# Patient Record
Sex: MALE | Race: WHITE | NOT HISPANIC OR LATINO | Employment: OTHER | URBAN - METROPOLITAN AREA
[De-identification: names, ages, dates, MRNs, and addresses within clinical notes are randomized per-mention and may not be internally consistent; named-entity substitution may affect disease eponyms.]

---

## 2017-02-14 ENCOUNTER — APPOINTMENT (OUTPATIENT)
Dept: ULTRASOUND IMAGING | Facility: HOSPITAL | Age: 62
End: 2017-02-14

## 2017-02-14 ENCOUNTER — HOSPITAL ENCOUNTER (INPATIENT)
Facility: HOSPITAL | Age: 62
LOS: 7 days | Discharge: HOME | End: 2017-02-21
Attending: EMERGENCY MEDICINE | Admitting: INTERNAL MEDICINE

## 2017-02-14 ENCOUNTER — APPOINTMENT (OUTPATIENT)
Dept: CT IMAGING | Facility: HOSPITAL | Age: 62
End: 2017-02-14

## 2017-02-14 DIAGNOSIS — K81.9 CHOLECYSTITIS: ICD-10-CM

## 2017-02-14 DIAGNOSIS — N20.1 RIGHT URETERAL STONE: Primary | ICD-10-CM

## 2017-02-14 DIAGNOSIS — K80.20 GALLSTONES: ICD-10-CM

## 2017-02-14 DIAGNOSIS — R10.11 RIGHT UPPER QUADRANT ABDOMINAL PAIN: ICD-10-CM

## 2017-02-14 PROBLEM — I10 HTN (HYPERTENSION): Status: ACTIVE | Noted: 2017-02-14

## 2017-02-14 LAB
ALBUMIN SERPL-MCNC: 4.5 G/DL (ref 3.2–4.8)
ALBUMIN/GLOB SERPL: 1.7 G/DL (ref 1.5–2.5)
ALP SERPL-CCNC: 63 U/L (ref 25–100)
ALT SERPL W P-5'-P-CCNC: 32 U/L (ref 7–40)
ANION GAP SERPL CALCULATED.3IONS-SCNC: 5 MMOL/L (ref 3–11)
AST SERPL-CCNC: 29 U/L (ref 0–33)
BACTERIA UR QL AUTO: ABNORMAL /HPF
BASOPHILS # BLD AUTO: 0.02 10*3/MM3 (ref 0–0.2)
BASOPHILS NFR BLD AUTO: 0.2 % (ref 0–1)
BILIRUB SERPL-MCNC: 0.8 MG/DL (ref 0.3–1.2)
BILIRUB UR QL STRIP: NEGATIVE
BUN BLD-MCNC: 13 MG/DL (ref 9–23)
BUN/CREAT SERPL: 14.4 (ref 7–25)
CALCIUM SPEC-SCNC: 10.3 MG/DL (ref 8.7–10.4)
CHLORIDE SERPL-SCNC: 102 MMOL/L (ref 99–109)
CLARITY UR: CLEAR
CO2 SERPL-SCNC: 29 MMOL/L (ref 20–31)
COLOR UR: YELLOW
CREAT BLD-MCNC: 0.9 MG/DL (ref 0.6–1.3)
DEPRECATED RDW RBC AUTO: 42.3 FL (ref 37–54)
EOSINOPHIL # BLD AUTO: 0.35 10*3/MM3 (ref 0.1–0.3)
EOSINOPHIL NFR BLD AUTO: 3.8 % (ref 0–3)
ERYTHROCYTE [DISTWIDTH] IN BLOOD BY AUTOMATED COUNT: 12.5 % (ref 11.3–14.5)
GFR SERPL CREATININE-BSD FRML MDRD: 86 ML/MIN/1.73
GLOBULIN UR ELPH-MCNC: 2.7 GM/DL
GLUCOSE BLD-MCNC: 126 MG/DL (ref 70–100)
GLUCOSE UR STRIP-MCNC: NEGATIVE MG/DL
HCT VFR BLD AUTO: 46.9 % (ref 38.9–50.9)
HGB BLD-MCNC: 16.2 G/DL (ref 13.1–17.5)
HGB UR QL STRIP.AUTO: ABNORMAL
HOLD SPECIMEN: NORMAL
HOLD SPECIMEN: NORMAL
HYALINE CASTS UR QL AUTO: ABNORMAL /LPF
IMM GRANULOCYTES # BLD: 0.08 10*3/MM3 (ref 0–0.03)
IMM GRANULOCYTES NFR BLD: 0.9 % (ref 0–0.6)
KETONES UR QL STRIP: NEGATIVE
LEUKOCYTE ESTERASE UR QL STRIP.AUTO: NEGATIVE
LIPASE SERPL-CCNC: 33 U/L (ref 6–51)
LYMPHOCYTES # BLD AUTO: 1.61 10*3/MM3 (ref 0.6–4.8)
LYMPHOCYTES NFR BLD AUTO: 17.5 % (ref 24–44)
MCH RBC QN AUTO: 32 PG (ref 27–31)
MCHC RBC AUTO-ENTMCNC: 34.5 G/DL (ref 32–36)
MCV RBC AUTO: 92.5 FL (ref 80–99)
MONOCYTES # BLD AUTO: 0.87 10*3/MM3 (ref 0–1)
MONOCYTES NFR BLD AUTO: 9.5 % (ref 0–12)
NEUTROPHILS # BLD AUTO: 6.27 10*3/MM3 (ref 1.5–8.3)
NEUTROPHILS NFR BLD AUTO: 68.1 % (ref 41–71)
NITRITE UR QL STRIP: NEGATIVE
PH UR STRIP.AUTO: 6 [PH] (ref 5–8)
PLATELET # BLD AUTO: 254 10*3/MM3 (ref 150–450)
PMV BLD AUTO: 9.9 FL (ref 6–12)
POTASSIUM BLD-SCNC: 4 MMOL/L (ref 3.5–5.5)
PROT SERPL-MCNC: 7.2 G/DL (ref 5.7–8.2)
PROT UR QL STRIP: NEGATIVE
RBC # BLD AUTO: 5.07 10*6/MM3 (ref 4.2–5.76)
RBC # UR: ABNORMAL /HPF
REF LAB TEST METHOD: ABNORMAL
SODIUM BLD-SCNC: 136 MMOL/L (ref 132–146)
SP GR UR STRIP: 1.02 (ref 1–1.03)
SQUAMOUS #/AREA URNS HPF: ABNORMAL /HPF
UROBILINOGEN UR QL STRIP: ABNORMAL
WBC NRBC COR # BLD: 9.2 10*3/MM3 (ref 3.5–10.8)
WBC UR QL AUTO: ABNORMAL /HPF
WHOLE BLOOD HOLD SPECIMEN: NORMAL
WHOLE BLOOD HOLD SPECIMEN: NORMAL

## 2017-02-14 PROCEDURE — 76705 ECHO EXAM OF ABDOMEN: CPT

## 2017-02-14 PROCEDURE — 25010000002 PIPERACILLIN SOD-TAZOBACTAM PER 1 G: Performed by: EMERGENCY MEDICINE

## 2017-02-14 PROCEDURE — 99284 EMERGENCY DEPT VISIT MOD MDM: CPT

## 2017-02-14 PROCEDURE — 25010000002 PIPERACILLIN-TAZOBACTAM: Performed by: INTERNAL MEDICINE

## 2017-02-14 PROCEDURE — 25010000002 HEPARIN (PORCINE) PER 1000 UNITS: Performed by: INTERNAL MEDICINE

## 2017-02-14 PROCEDURE — 81001 URINALYSIS AUTO W/SCOPE: CPT | Performed by: EMERGENCY MEDICINE

## 2017-02-14 PROCEDURE — 25010000002 HYDROMORPHONE PER 4 MG: Performed by: EMERGENCY MEDICINE

## 2017-02-14 PROCEDURE — 25010000002 KETOROLAC TROMETHAMINE PER 15 MG: Performed by: EMERGENCY MEDICINE

## 2017-02-14 PROCEDURE — 80053 COMPREHEN METABOLIC PANEL: CPT | Performed by: EMERGENCY MEDICINE

## 2017-02-14 PROCEDURE — 25010000002 KETOROLAC TROMETHAMINE PER 15 MG: Performed by: INTERNAL MEDICINE

## 2017-02-14 PROCEDURE — 85025 COMPLETE CBC W/AUTO DIFF WBC: CPT | Performed by: EMERGENCY MEDICINE

## 2017-02-14 PROCEDURE — G0378 HOSPITAL OBSERVATION PER HR: HCPCS

## 2017-02-14 PROCEDURE — 93005 ELECTROCARDIOGRAM TRACING: CPT | Performed by: EMERGENCY MEDICINE

## 2017-02-14 PROCEDURE — 99223 1ST HOSP IP/OBS HIGH 75: CPT | Performed by: INTERNAL MEDICINE

## 2017-02-14 PROCEDURE — 83690 ASSAY OF LIPASE: CPT | Performed by: EMERGENCY MEDICINE

## 2017-02-14 PROCEDURE — 25010000002 PROMETHAZINE PER 50 MG: Performed by: EMERGENCY MEDICINE

## 2017-02-14 PROCEDURE — 74176 CT ABD & PELVIS W/O CONTRAST: CPT

## 2017-02-14 RX ORDER — SENNA AND DOCUSATE SODIUM 50; 8.6 MG/1; MG/1
2 TABLET, FILM COATED ORAL 2 TIMES DAILY
Status: DISCONTINUED | OUTPATIENT
Start: 2017-02-14 | End: 2017-02-21 | Stop reason: HOSPADM

## 2017-02-14 RX ORDER — SODIUM CHLORIDE 0.9 % (FLUSH) 0.9 %
10 SYRINGE (ML) INJECTION AS NEEDED
Status: DISCONTINUED | OUTPATIENT
Start: 2017-02-14 | End: 2017-02-15

## 2017-02-14 RX ORDER — KETOROLAC TROMETHAMINE 30 MG/ML
30 INJECTION, SOLUTION INTRAMUSCULAR; INTRAVENOUS ONCE
Status: COMPLETED | OUTPATIENT
Start: 2017-02-14 | End: 2017-02-14

## 2017-02-14 RX ORDER — SODIUM CHLORIDE 0.9 % (FLUSH) 0.9 %
1-10 SYRINGE (ML) INJECTION AS NEEDED
Status: DISCONTINUED | OUTPATIENT
Start: 2017-02-14 | End: 2017-02-21 | Stop reason: HOSPADM

## 2017-02-14 RX ORDER — DOCUSATE SODIUM 100 MG/1
100 CAPSULE, LIQUID FILLED ORAL 2 TIMES DAILY
Status: DISCONTINUED | OUTPATIENT
Start: 2017-02-14 | End: 2017-02-21 | Stop reason: HOSPADM

## 2017-02-14 RX ORDER — HYDROCHLOROTHIAZIDE 12.5 MG/1
12.5 TABLET ORAL
Status: DISCONTINUED | OUTPATIENT
Start: 2017-02-14 | End: 2017-02-15 | Stop reason: CLARIF

## 2017-02-14 RX ORDER — PROMETHAZINE HYDROCHLORIDE 25 MG/ML
12.5 INJECTION, SOLUTION INTRAMUSCULAR; INTRAVENOUS ONCE
Status: COMPLETED | OUTPATIENT
Start: 2017-02-14 | End: 2017-02-14

## 2017-02-14 RX ORDER — ONDANSETRON 4 MG/1
4 TABLET, FILM COATED ORAL EVERY 6 HOURS PRN
Status: DISCONTINUED | OUTPATIENT
Start: 2017-02-14 | End: 2017-02-21 | Stop reason: HOSPADM

## 2017-02-14 RX ORDER — KETOROLAC TROMETHAMINE 30 MG/ML
30 INJECTION, SOLUTION INTRAMUSCULAR; INTRAVENOUS EVERY 6 HOURS PRN
Status: COMPLETED | OUTPATIENT
Start: 2017-02-14 | End: 2017-02-15

## 2017-02-14 RX ORDER — LISINOPRIL 20 MG/1
20 TABLET ORAL
Status: DISCONTINUED | OUTPATIENT
Start: 2017-02-14 | End: 2017-02-15 | Stop reason: CLARIF

## 2017-02-14 RX ORDER — HYDROMORPHONE HYDROCHLORIDE 1 MG/ML
0.5 INJECTION, SOLUTION INTRAMUSCULAR; INTRAVENOUS; SUBCUTANEOUS ONCE
Status: COMPLETED | OUTPATIENT
Start: 2017-02-14 | End: 2017-02-14

## 2017-02-14 RX ORDER — HEPARIN SODIUM 5000 [USP'U]/ML
5000 INJECTION, SOLUTION INTRAVENOUS; SUBCUTANEOUS EVERY 8 HOURS SCHEDULED
Status: DISCONTINUED | OUTPATIENT
Start: 2017-02-14 | End: 2017-02-16

## 2017-02-14 RX ORDER — LISINOPRIL AND HYDROCHLOROTHIAZIDE 20; 12.5 MG/1; MG/1
1 TABLET ORAL DAILY
Status: DISCONTINUED | OUTPATIENT
Start: 2017-02-14 | End: 2017-02-14

## 2017-02-14 RX ORDER — ONDANSETRON 2 MG/ML
4 INJECTION INTRAMUSCULAR; INTRAVENOUS EVERY 6 HOURS PRN
Status: DISCONTINUED | OUTPATIENT
Start: 2017-02-14 | End: 2017-02-21 | Stop reason: HOSPADM

## 2017-02-14 RX ORDER — LISINOPRIL AND HYDROCHLOROTHIAZIDE 20; 12.5 MG/1; MG/1
1 TABLET ORAL DAILY
COMMUNITY

## 2017-02-14 RX ADMIN — PROMETHAZINE HYDROCHLORIDE 12.5 MG: 25 INJECTION, SOLUTION INTRAMUSCULAR; INTRAVENOUS at 11:54

## 2017-02-14 RX ADMIN — KETOROLAC TROMETHAMINE 30 MG: 30 INJECTION, SOLUTION INTRAMUSCULAR at 12:55

## 2017-02-14 RX ADMIN — KETOROLAC TROMETHAMINE 30 MG: 30 INJECTION, SOLUTION INTRAMUSCULAR at 21:53

## 2017-02-14 RX ADMIN — DOCUSATE SODIUM 100 MG: 100 CAPSULE, LIQUID FILLED ORAL at 18:49

## 2017-02-14 RX ADMIN — DOCUSATE SODIUM AND SENNOSIDES 2 TABLET: 8.6; 5 TABLET, FILM COATED ORAL at 18:49

## 2017-02-14 RX ADMIN — LISINOPRIL 20 MG: 20 TABLET ORAL at 18:50

## 2017-02-14 RX ADMIN — TAZOBACTAM SODIUM AND PIPERACILLIN SODIUM 4.5 G: 500; 4 INJECTION, SOLUTION INTRAVENOUS at 15:54

## 2017-02-14 RX ADMIN — TAZOBACTAM SODIUM AND PIPERACILLIN SODIUM 4.5 G: .5; 4 INJECTION, POWDER, LYOPHILIZED, FOR SOLUTION INTRAVENOUS at 21:42

## 2017-02-14 RX ADMIN — SODIUM CHLORIDE 1000 ML: 9 INJECTION, SOLUTION INTRAVENOUS at 11:54

## 2017-02-14 RX ADMIN — HEPARIN SODIUM 5000 UNITS: 5000 INJECTION, SOLUTION INTRAVENOUS; SUBCUTANEOUS at 21:42

## 2017-02-14 RX ADMIN — HYDROMORPHONE HYDROCHLORIDE 0.5 MG: 1 INJECTION, SOLUTION INTRAMUSCULAR; INTRAVENOUS; SUBCUTANEOUS at 11:53

## 2017-02-14 NOTE — PLAN OF CARE
Problem: Cholecystitis/Cholecystectomy (Adult)  Goal: Signs and Symptoms of Listed Potential Problems Will be Absent or Manageable (Cholecystitis/Cholecystectomy)  Outcome: Ongoing (interventions implemented as appropriate)

## 2017-02-14 NOTE — ED PROVIDER NOTES
Subjective   HPI Comments: Antonino Barrera is a 61 y.o. male presenting to the ED with c/o abdominal and flank pain. Mr. Barrera reports that he has been having pain in his right flank since 0400 this morning. He reports the pain radiating down to his right lower quadrant. The pain worsens with movement. He has a previous history of kidney stones, and reports that the pain today feels different to his previous episodes. He also reports feeling nauseated, reporting mild relief with Zofran. Denies any vomiting, diarrhea, hematuria, dysuria, fevers, or chills. No other complaints at this time.    Patient is a 61 y.o. male presenting with abdominal pain.   History provided by:  Patient  Abdominal Pain   Pain location:  R flank  Pain radiates to:  RLQ  Pain severity:  Moderate  Onset quality:  Sudden  Duration:  8 hours  Timing:  Constant  Progression:  Worsening  Chronicity:  Recurrent  Relieved by:  Nothing  Worsened by:  Movement  Associated symptoms: nausea    Associated symptoms: no chest pain, no chills, no cough, no diarrhea, no dysuria, no fatigue, no fever, no hematemesis, no hematochezia, no hematuria, no melena, no shortness of breath and no vomiting    Risk factors: has not had multiple surgeries        Review of Systems   Constitutional: Negative for chills, fatigue and fever.   HENT: Negative for rhinorrhea.    Respiratory: Negative for cough and shortness of breath.    Cardiovascular: Negative for chest pain and leg swelling.   Gastrointestinal: Positive for abdominal pain and nausea. Negative for diarrhea, hematemesis, hematochezia, melena and vomiting.   Genitourinary: Positive for flank pain. Negative for dysuria and hematuria.   Musculoskeletal: Negative for back pain and joint swelling.   Skin: Negative for color change.   Neurological: Negative for dizziness, syncope, weakness, light-headedness, numbness and headaches.   All other systems reviewed and are negative.      Past Medical History   Diagnosis  Date   • DVT of leg (deep venous thrombosis)      over 10 years ago after trauma to left leg    • Hypertension    • Kidney stone    • Meniere disease        No Known Allergies    Past Surgical History   Procedure Laterality Date   • Knee surgery         History reviewed. No pertinent family history.    Social History     Social History   • Marital status: Single     Spouse name: N/A   • Number of children: N/A   • Years of education: N/A     Social History Main Topics   • Smoking status: Former Smoker   • Smokeless tobacco: None   • Alcohol use Yes      Comment: occasional / on weekends    • Drug use: No   • Sexual activity: Not Asked     Other Topics Concern   • None     Social History Narrative   • None         Objective   Physical Exam   Constitutional: He is oriented to person, place, and time. He appears well-developed and well-nourished. No distress.   HENT:   Head: Normocephalic and atraumatic.   Eyes: Conjunctivae and EOM are normal.   Neck: Normal range of motion. Neck supple.   Cardiovascular: Normal rate, regular rhythm, normal heart sounds and intact distal pulses.    Pulmonary/Chest: Effort normal and breath sounds normal. No respiratory distress. He exhibits no tenderness.   Abdominal: Soft. Bowel sounds are normal. There is tenderness (mild) in the left lower quadrant.   Musculoskeletal: Normal range of motion. He exhibits no edema or tenderness.   Neurological: He is alert and oriented to person, place, and time.   Skin: Skin is warm and dry. No erythema.   Psychiatric: He has a normal mood and affect. His behavior is normal.   Nursing note and vitals reviewed.      Procedures         ED Course  ED Course   Comment By Time   The voice clip for Mr. Brarera's CAT scan indicates a small distal right ureteral stone.  It also indicates a gallstone.  On CT the gallstone appeared to be lodged in the neck of the gallbladder.  His symptoms are more consistent with renal colic however.  We will obtain ultrasound  of his gallbladder to further evaluate Meng Taylor MD 02/14 1244   Mr. Barrera declines any further pain medication and continues to appear comfortable.  I spoke with him and his wife about findings and plan. Meng Taylor MD 02/14 1246   Voice clip regarding her ultrasound indicates gallbladder wall thickening and possible stones in the proximal common bile duct.  We will give IV antibiotics and admit to the hospital for further evaluation. Meng Taylor MD 02/14 2655   I spoke with Mr. Barrera and his family about ultrasound findings.  He tells me he is comfortable at this point.  I have paged the hospitalist Meng Taylor MD 02/14 1606   Spoke with Dr. Jones, the hospitalist who will admit Mr. Barrera to the hospital. -[facility] Balbir Reddy 02/14 1613               Recent Results (from the past 24 hour(s))   Comprehensive Metabolic Panel    Collection Time: 02/14/17 11:08 AM   Result Value Ref Range    Glucose 126 (H) 70 - 100 mg/dL    BUN 13 9 - 23 mg/dL    Creatinine 0.90 0.60 - 1.30 mg/dL    Sodium 136 132 - 146 mmol/L    Potassium 4.0 3.5 - 5.5 mmol/L    Chloride 102 99 - 109 mmol/L    CO2 29.0 20.0 - 31.0 mmol/L    Calcium 10.3 8.7 - 10.4 mg/dL    Total Protein 7.2 5.7 - 8.2 g/dL    Albumin 4.50 3.20 - 4.80 g/dL    ALT (SGPT) 32 7 - 40 U/L    AST (SGOT) 29 0 - 33 U/L    Alkaline Phosphatase 63 25 - 100 U/L    Total Bilirubin 0.8 0.3 - 1.2 mg/dL    eGFR Non African Amer 86 >60 mL/min/1.73    Globulin 2.7 gm/dL    A/G Ratio 1.7 1.5 - 2.5 g/dL    BUN/Creatinine Ratio 14.4 7.0 - 25.0    Anion Gap 5.0 3.0 - 11.0 mmol/L   Lipase    Collection Time: 02/14/17 11:08 AM   Result Value Ref Range    Lipase 33 6 - 51 U/L   Urinalysis With / Culture If Indicated    Collection Time: 02/14/17 11:08 AM   Result Value Ref Range    Color, UA Yellow Yellow, Straw    Appearance, UA Clear Clear    pH, UA 6.0 5.0 - 8.0    Specific Gravity, UA 1.025 1.005 - 1.030    Glucose, UA Negative Negative    Ketones, UA Negative Negative     Bilirubin, UA Negative Negative    Blood, UA Trace (A) Negative    Protein, UA Negative Negative    Leuk Esterase, UA Negative Negative    Nitrite, UA Negative Negative    Urobilinogen, UA 0.2 E.U./dL 0.2 - 1.0 E.U./dL   Light Blue Top    Collection Time: 02/14/17 11:08 AM   Result Value Ref Range    Extra Tube hold for add-on    Green Top (Gel)    Collection Time: 02/14/17 11:08 AM   Result Value Ref Range    Extra Tube Hold for add-ons.    Lavender Top    Collection Time: 02/14/17 11:08 AM   Result Value Ref Range    Extra Tube hold for add-on    Gold Top - SST    Collection Time: 02/14/17 11:08 AM   Result Value Ref Range    Extra Tube Hold for add-ons.    CBC Auto Differential    Collection Time: 02/14/17 11:08 AM   Result Value Ref Range    WBC 9.20 3.50 - 10.80 10*3/mm3    RBC 5.07 4.20 - 5.76 10*6/mm3    Hemoglobin 16.2 13.1 - 17.5 g/dL    Hematocrit 46.9 38.9 - 50.9 %    MCV 92.5 80.0 - 99.0 fL    MCH 32.0 (H) 27.0 - 31.0 pg    MCHC 34.5 32.0 - 36.0 g/dL    RDW 12.5 11.3 - 14.5 %    RDW-SD 42.3 37.0 - 54.0 fl    MPV 9.9 6.0 - 12.0 fL    Platelets 254 150 - 450 10*3/mm3    Neutrophil % 68.1 41.0 - 71.0 %    Lymphocyte % 17.5 (L) 24.0 - 44.0 %    Monocyte % 9.5 0.0 - 12.0 %    Eosinophil % 3.8 (H) 0.0 - 3.0 %    Basophil % 0.2 0.0 - 1.0 %    Immature Grans % 0.9 (H) 0.0 - 0.6 %    Neutrophils, Absolute 6.27 1.50 - 8.30 10*3/mm3    Lymphocytes, Absolute 1.61 0.60 - 4.80 10*3/mm3    Monocytes, Absolute 0.87 0.00 - 1.00 10*3/mm3    Eosinophils, Absolute 0.35 (H) 0.10 - 0.30 10*3/mm3    Basophils, Absolute 0.02 0.00 - 0.20 10*3/mm3    Immature Grans, Absolute 0.08 (H) 0.00 - 0.03 10*3/mm3   Urinalysis, Microscopic Only    Collection Time: 02/14/17 11:08 AM   Result Value Ref Range    RBC, UA 0-2 None Seen, 0-2 /HPF    WBC, UA 0-2 (A) None Seen /HPF    Bacteria, UA None Seen None Seen, Trace /HPF    Squamous Epithelial Cells, UA None Seen None Seen, 0-2 /HPF    Hyaline Casts, UA None Seen 0 - 6 /LPF     Methodology Automated Microscopy      Note: In addition to lab results from this visit, the labs listed above may include labs taken at another facility or during a different encounter within the last 24 hours. Please correlate lab times with ED admission and discharge times for further clarification of the services performed during this visit.    US Gallbladder   Preliminary Result   1.  Mild thickening of the gallbladder wall.   2.  Some echogenic debris within the gallbladder without evidence of   shadowing stones.   3.  Shadowing echogenic focus in the proximal common bile duct; possible   small stones.   4.  Dense hepatic parenchyma.       D:  02/14/2017   E:  02/14/2017                   CT Abdomen Pelvis Without Contrast   Preliminary Result   1. Bilateral nonobstructing nephroliths.   2. Small punctate speck of calcification and/or stone in the distal   portion of the right ureter; nonobstructing.   3. 9 mm stone lodged in the gallbladder neck.       D:  02/14/2017   E:  02/14/2017            Vitals:    02/14/17 1501 02/14/17 1630 02/14/17 1848 02/14/17 2200   BP:  142/82 140/96 136/80   BP Location:    Left arm   Patient Position:    Lying   Pulse: 56 66  67   Resp:    20   Temp:    98.1 °F (36.7 °C)   TempSrc:    Oral   SpO2: 95% 98%  95%   Weight:       Height:         Medications   sodium chloride 0.9 % flush 10 mL (not administered)   sodium chloride 0.9 % flush 1-10 mL (not administered)   heparin (porcine) 5000 UNIT/ML injection 5,000 Units (5,000 Units Subcutaneous Given 2/14/17 2142)   ketorolac (TORADOL) injection 30 mg (30 mg Intravenous Given 2/14/17 2153)   ondansetron (ZOFRAN) tablet 4 mg (not administered)     Or   ondansetron (ZOFRAN) injection 4 mg (not administered)   docusate sodium (COLACE) capsule 100 mg (100 mg Oral Given 2/14/17 1849)   sennosides-docusate sodium (SENOKOT-S) 8.6-50 MG tablet 2 tablet (2 tablets Oral Given 2/14/17 1849)   piperacillin-tazobactam (ZOSYN) 4.5 g/100 mL  0.9% NS IVPB (mbp) (4.5 g Intravenous New Bag 2/14/17 2142)   lisinopril (PRINIVIL,ZESTRIL) tablet 20 mg (20 mg Oral Given 2/14/17 1850)     Or   hydrochlorothiazide (HYDRODIURIL) tablet 12.5 mg ( Oral Not Given:  See Alt 2/14/17 1850)   sodium chloride 0.9 % bolus 1,000 mL (0 mL Intravenous Stopped 2/14/17 1330)   HYDROmorphone (DILAUDID) injection 0.5 mg (0.5 mg Intravenous Given 2/14/17 1153)   promethazine (PHENERGAN) injection 12.5 mg (12.5 mg Intravenous Given 2/14/17 1154)   ketorolac (TORADOL) injection 30 mg (30 mg Intravenous Given 2/14/17 1255)   piperacillin-tazobactam (ZOSYN) 4.5 g in dextrose 100 mL IVPB (premix) (0 g Intravenous Stopped 2/14/17 1639)     ECG/EMG Results (last 24 hours)     ** No results found for the last 24 hours. **              MDM  Number of Diagnoses or Management Options  Cholecystitis: new and requires workup  Right upper quadrant abdominal pain: new and requires workup  Right ureteral stone: new and requires workup     Amount and/or Complexity of Data Reviewed  Clinical lab tests: ordered and reviewed  Tests in the radiology section of CPT®: ordered and reviewed  Discuss the patient with other providers: yes  Independent visualization of images, tracings, or specimens: yes    Patient Progress  Patient progress: improved      Final diagnoses:   Right ureteral stone   Cholecystitis   Right upper quadrant abdominal pain       Documentation assistance provided by noemi Reddy.  Information recorded by the scribarturo was done at my direction and has been verified and validated by me.     Balbir Reddy  02/14/17 1134       Balbir Reddy  02/14/17 2258       Meng Taylor MD  02/14/17 0018

## 2017-02-14 NOTE — H&P
[facility]  HISTORY AND PHYSICAL    Primary Care Physician: No Known Provider    Subjective     Chief Complaint: abd pain     History of Present Illness:   61 y.o male with PMH of HTN, and multiple kidney stones presented to the ED today with c/o right sided abdominal pain that woke him this morning from sleep. Patient began having aching with bloating and pressure, causing nausea that began in his epigastric area and moved into his RUQ and around to his back/right flank area. States he took Zofran and was able to lay back down within a few hours with lessened pain. Patient continued to have these symptoms throughout the day and noted that it was hard for him to drive himself to [place] due to his back hitting his car seat. Called his PCP to ask about symptom relief and was told to go to ED to be evaluated. CT scan in ED showed non obstructing nephroliths with a non obstructing stone in the distal portion of the right ureter, and a 9 mm stone lodged in the gallbladder neck. Gall bladder US showed mild thickening of the gall bladder wall. Started on IV pain medications and zosyn. Asked to be admitted to hospital medicine for continued monitoring and treatment.   Denies f/c, v/d, cough, SOA, palpitations, or CP.     Review of Systems   Constitutional: Negative for activity change, appetite change, chills, diaphoresis, fatigue and fever.   HENT: Negative for congestion, mouth sores, postnasal drip, rhinorrhea, sore throat, tinnitus, trouble swallowing and voice change.    Eyes: Negative for photophobia, redness and visual disturbance.   Respiratory: Negative for cough, choking, chest tightness, shortness of breath, wheezing and stridor.    Cardiovascular: Positive for leg swelling. Negative for chest pain and palpitations.   Gastrointestinal: Positive for abdominal pain, constipation and nausea. Negative for abdominal distention, blood in stool, diarrhea and vomiting.  "  Endocrine: Negative for polydipsia, polyphagia and polyuria.   Genitourinary: Positive for flank pain. Negative for decreased urine volume, difficulty urinating, dysuria, frequency and hematuria.   Musculoskeletal: Positive for back pain. Negative for gait problem, joint swelling, myalgias, neck pain and neck stiffness.   Skin: Negative for color change, pallor, rash and wound.   Neurological: Negative for dizziness, tremors, syncope, light-headedness, numbness and headaches.   Hematological: Negative for adenopathy.   Psychiatric/Behavioral: Positive for sleep disturbance. Negative for agitation, behavioral problems and confusion. The patient is not nervous/anxious.       Otherwise complete ROS performed and negative except as mentioned in the HPI.    Past Medical History:   Past Medical History   Diagnosis Date   • DVT of leg (deep venous thrombosis)      over 10 years ago after trauma to left leg    • Hypertension    • Kidney stone    • Meniere disease        Past Surgical History:  Past Surgical History   Procedure Laterality Date   • Knee surgery         Family History: family history is not on file.   Reviewed and noncontributory    Social History:  reports that he has quit smoking. He does not have any smokeless tobacco history on file. He reports that he drinks alcohol. He reports that he does not use illicit drugs.    Medications:  Prescriptions Prior to Admission   Medication Sig Dispense Refill Last Dose   • lisinopril-hydrochlorothiazide (PRINZIDE,ZESTORETIC) 20-12.5 MG per tablet Take 1 tablet by mouth Daily.          Allergies:  No Known Allergies      Objective     Physical Exam:  Vital Signs:   Visit Vitals   • /82   • Pulse 66   • Temp 97.6 °F (36.4 °C) (Oral)   • Resp 20   • Ht 69\" (175.3 cm)   • Wt 200 lb (90.7 kg)   • SpO2 98%   • BMI 29.53 kg/m2     Physical Exam   Constitutional: He is oriented to person, place, and time. He appears well-developed and well-nourished. No distress. "   HENT:   Head: Normocephalic and atraumatic.   Mouth/Throat: Oropharynx is clear and moist.   Eyes: Conjunctivae are normal. Pupils are equal, round, and reactive to light. No scleral icterus.   Neck: Neck supple. No thyromegaly present.   Cardiovascular: Normal rate, regular rhythm, normal heart sounds and intact distal pulses.  Exam reveals no gallop and no friction rub.    No murmur heard.  Pulmonary/Chest: Effort normal and breath sounds normal. No stridor. No respiratory distress. He has no wheezes. He has no rales.   Abdominal: Soft. Bowel sounds are normal. He exhibits no distension. There is no hepatosplenomegaly. There is tenderness in the right upper quadrant and epigastric area. There is no rigidity, no rebound, no guarding and no CVA tenderness. A hernia (soft, reducible ) is present.   Obese    Musculoskeletal: He exhibits no edema or tenderness.   Lymphadenopathy:     He has no cervical adenopathy.   Neurological: He is alert and oriented to person, place, and time. No cranial nerve deficit.   Skin: Skin is warm and dry. No rash noted. He is not diaphoretic. No cyanosis or erythema. No pallor. Nails show no clubbing.   Small horizontal reddened abrasion on LLQ from tape   Psychiatric: He has a normal mood and affect. His behavior is normal.   Vitals reviewed.      Results Reviewed:    Results from last 7 days  Lab Units 02/14/17  1108   WBC 10*3/mm3 9.20   HEMOGLOBIN g/dL 16.2   PLATELETS 10*3/mm3 254       Results from last 7 days  Lab Units 02/14/17  1108   SODIUM mmol/L 136   POTASSIUM mmol/L 4.0   TOTAL CO2 mmol/L 29.0   CREATININE mg/dL 0.90   GLUCOSE mg/dL 126*   CALCIUM mg/dL 10.3   Results for CYNTHIA AG (MRN [id number]) as of 2/14/2017 16:45   Ref. Range 2/14/2017 11:08   Lipase Latest Ref Range: 6 - 51 U/L 33     EXAMINATION: CT ABDOMEN PELVIS WO CONTRAST- 02/14/2017      INDICATION: Right flank pain radiating to right lower quadrant. History  of kidney stones.      TECHNIQUE: Multislice  helical CT with two-dimensional reformatted  images.      The radiation dose reduction device was turned on for each scan per the  ALARA (As Low as Reasonably Achievable) protocol.      COMPARISON: NONE      FINDINGS:   Mild atelectasis is present in the lower lobes. The heart size is  normal. There is transmural thickening in the distal third of the  esophagus.      The hepatic parenchyma is free of masses. The gallbladder contains a  stone lodged in the neck measuring 9 mm in diameter. The spleen,  pancreas, and adrenal glands are normal. The renal contours are smooth.  Nonobstructing nephroliths are present bilaterally. There is no  significant hydronephrosis. The course and caliber of the ureters are  normal. A punctate density is present in the distal aspect of the right  ureter which does not appear to be obstructing. There are no  hyperdensities in the bladder. The right colon contains stool. The  appendix is normal. There is no free pelvic fluid. The prostate gland is  normal. The sigmoid colon contains a few diverticula. There is no  significant inguinal adenopathy.      The osseous structures of the abdomen and pelvis are normal.      IMPRESSION:  1. Bilateral nonobstructing nephroliths.  2. Small punctate speck of calcification and/or stone in the distal  portion of the right ureter; nonobstructing.  3. 9 mm stone lodged in the gallbladder neck.      Study Result      EXAMINATION: US GALLBLADDER-02/14/2017:      INDICATION: R/O cholecystitis; N20.1-Calculus of ureter; K80.20-Calculus  of gallbladder without cholecystitis without obstruction, abdominal pain  radiating into the back x 1 day.      TECHNIQUE: Multiple images through the right upper quadrant were  obtained with a 4 MHz probe.      COMPARISON: NONE.      FINDINGS: The visible portions of the pancreatic head are normal. The  pancreatic body and tail are not visible.      The hepatic parenchyma is of increased density. There are no  hepatic  masses in the liver.      The gallbladder lumen is free of echogenic foci. The gallbladder wall  measures 3.9 mm in thickness. There is some thickening along the wall of  the gallbladder which does not demonstrate significant posterior  shadowing.      The common bile duct measures 3.7 mm in diameter. There is suggestion of  some calcification within the common bile duct.      The right kidney measures 11.5 x 5.5 x 5.6 cm. There is no renal mass or  hydronephrosis.      IMPRESSION:  1. Mild thickening of the gallbladder wall.  2. Some echogenic debris within the gallbladder without evidence of  shadowing stones.  3. Shadowing echogenic focus in the proximal common bile duct; possible  small stones.  4. Dense hepatic parenchyma.       I have personally reviewed and interpreted available lab data, radiology studies and ECG obtained at time of admission.     Assessment / Plan     Assessment/Problem List:   Principal Problem:    Cholecystitis  Active Problems:    Right ureteral stone    Right upper quadrant abdominal pain    HTN (hypertension)    Gall stones    Plan:  Admit to hospital medicine  Regular diet tonight  NPO after MN  General surgery consult  PRN IV toradol   Cont zosyn for now   BMP/ Mag in am   Cont home BP medicine     DVT prophylaxis: Hep SQ  Code Status:FULL     Anamika Sales, APRN 02/14/17 6:21 PM      Brief Attending Note   I have seen and examined the patient, performing an independent face-to-face diagnostic evaluation with plan of care reviewed and developed with the advanced practice clinician (APC).    Brief Summary Statement/HPI:   The patient is a 61-year-old male with some chronic abdominal pain who presents with acute right upper quadrant and flank pain.  Associated with some mild nausea, no vomiting, and changes in bowel movements.  Workup in the emergency room is shown a 9 mm stone lodged in the neck gallbladder along with some mild gallbladder wall thickening.  There is also  bilateral kidney stones and a very small nonobstructing stone in the right ureter.  Currently he is comfortable after pain medicines.  No history of fevers.  Has had recurrent symptoms over the last 2-3 months that was not seen a physician.  He's been admitted further evaluation.    Attending Physical Exam:  Temp:  [97.6 °F (36.4 °C)] 97.6 °F (36.4 °C)  Heart Rate:  [45-74] 66  Resp:  [20] 20  BP: (128-178)/(81-86) 142/82     Constitutional: no acute distress, awake, alert  Eyes: PERRLA, sclerae anicteric, no conjunctival injection  Neck: supple, no thyromegaly, trachea midline  Respiratory: Clear to auscultation bilaterally, nonlabored respirations   Cardiovascular: RRR, no murmurs, rubs, or gallops, palpable pedal pulses bilaterally  Gastrointestinal: Positive bowel sounds, some mild RUQ tenderness to deep palpation, negative sandra's sign  Musculoskeletal: No bilateral ankle edema, no clubbing or cyanosis to bilateral lower extremities  Psychiatric: oriented x 3, appropriate affect, cooperative  Neurologic: Strength symmetric in all extremities, Cranial Nerves grossly intact    Brief Assessment/Plan :  Patient presents with some right upper quadrant abdominal pain and evidence of a stone in the neck gallbladder along with some mild gallbladder wall thickening.  I suspect he has some degree of chronic cholecystitis.  We'll continue with empiric Zosyn.  We'll have general surgery evaluate him in the morning.  Currently LFTs are within normal limits but will recheck in the morning.  Symptoms and exam normal consistent with a biliary cause rather than the small right ureteral stone.    See above for further detailed assessment and plan developed with [facility] which I have reviewed and/or edited.    I believe this patient meets OBSERVATION status, however if further evaluation or treatment plans warrant, status may change.  Based upon current information, I predict patient's care encounter to be less than or equal to 2  midnights.      Shawn Rome MD  02/14/17  6:26 PM

## 2017-02-15 ENCOUNTER — ANESTHESIA EVENT (OUTPATIENT)
Dept: PERIOP | Facility: HOSPITAL | Age: 62
End: 2017-02-15

## 2017-02-15 ENCOUNTER — ANESTHESIA (OUTPATIENT)
Dept: PERIOP | Facility: HOSPITAL | Age: 62
End: 2017-02-15

## 2017-02-15 LAB
ALBUMIN SERPL-MCNC: 3.6 G/DL (ref 3.2–4.8)
ALBUMIN/GLOB SERPL: 1.5 G/DL (ref 1.5–2.5)
ALP SERPL-CCNC: 52 U/L (ref 25–100)
ALT SERPL W P-5'-P-CCNC: 24 U/L (ref 7–40)
ANION GAP SERPL CALCULATED.3IONS-SCNC: 2 MMOL/L (ref 3–11)
AST SERPL-CCNC: 20 U/L (ref 0–33)
BILIRUB SERPL-MCNC: 1.1 MG/DL (ref 0.3–1.2)
BUN BLD-MCNC: 14 MG/DL (ref 9–23)
BUN/CREAT SERPL: 14 (ref 7–25)
CALCIUM SPEC-SCNC: 9.4 MG/DL (ref 8.7–10.4)
CHLORIDE SERPL-SCNC: 105 MMOL/L (ref 99–109)
CO2 SERPL-SCNC: 29 MMOL/L (ref 20–31)
CREAT BLD-MCNC: 1 MG/DL (ref 0.6–1.3)
GFR SERPL CREATININE-BSD FRML MDRD: 76 ML/MIN/1.73
GLOBULIN UR ELPH-MCNC: 2.4 GM/DL
GLUCOSE BLD-MCNC: 96 MG/DL (ref 70–100)
GLUCOSE BLDC GLUCOMTR-MCNC: 185 MG/DL (ref 70–130)
POTASSIUM BLD-SCNC: 4 MMOL/L (ref 3.5–5.5)
PROT SERPL-MCNC: 6 G/DL (ref 5.7–8.2)
SODIUM BLD-SCNC: 136 MMOL/L (ref 132–146)

## 2017-02-15 PROCEDURE — 82962 GLUCOSE BLOOD TEST: CPT

## 2017-02-15 PROCEDURE — 93005 ELECTROCARDIOGRAM TRACING: CPT | Performed by: SURGERY

## 2017-02-15 PROCEDURE — 25010000002 ONDANSETRON PER 1 MG: Performed by: INTERNAL MEDICINE

## 2017-02-15 PROCEDURE — 25010000002 HYDROMORPHONE PER 4 MG: Performed by: NURSE ANESTHETIST, CERTIFIED REGISTERED

## 2017-02-15 PROCEDURE — 0FT44ZZ RESECTION OF GALLBLADDER, PERCUTANEOUS ENDOSCOPIC APPROACH: ICD-10-PCS | Performed by: SURGERY

## 2017-02-15 PROCEDURE — 25010000002 HEPARIN (PORCINE) PER 1000 UNITS: Performed by: INTERNAL MEDICINE

## 2017-02-15 PROCEDURE — 25010000002 FENTANYL CITRATE (PF) 100 MCG/2ML SOLUTION: Performed by: NURSE ANESTHETIST, CERTIFIED REGISTERED

## 2017-02-15 PROCEDURE — 25010000002 PIPERACILLIN-TAZOBACTAM: Performed by: INTERNAL MEDICINE

## 2017-02-15 PROCEDURE — 80053 COMPREHEN METABOLIC PANEL: CPT | Performed by: INTERNAL MEDICINE

## 2017-02-15 PROCEDURE — 25010000002 HYDROMORPHONE PER 4 MG: Performed by: SURGERY

## 2017-02-15 PROCEDURE — 0WQF4ZZ REPAIR ABDOMINAL WALL, PERCUTANEOUS ENDOSCOPIC APPROACH: ICD-10-PCS | Performed by: SURGERY

## 2017-02-15 PROCEDURE — 25010000002 PROPOFOL 10 MG/ML EMULSION: Performed by: NURSE ANESTHETIST, CERTIFIED REGISTERED

## 2017-02-15 PROCEDURE — 99233 SBSQ HOSP IP/OBS HIGH 50: CPT | Performed by: HOSPITALIST

## 2017-02-15 PROCEDURE — 25010000002 NEOSTIGMINE 10 MG/10ML SOLUTION: Performed by: NURSE ANESTHETIST, CERTIFIED REGISTERED

## 2017-02-15 PROCEDURE — 25010000002 KETOROLAC TROMETHAMINE PER 15 MG: Performed by: INTERNAL MEDICINE

## 2017-02-15 PROCEDURE — 88304 TISSUE EXAM BY PATHOLOGIST: CPT | Performed by: SURGERY

## 2017-02-15 RX ORDER — SODIUM CHLORIDE 0.9 % (FLUSH) 0.9 %
1-10 SYRINGE (ML) INJECTION AS NEEDED
Status: DISCONTINUED | OUTPATIENT
Start: 2017-02-15 | End: 2017-02-15 | Stop reason: HOSPADM

## 2017-02-15 RX ORDER — SODIUM CHLORIDE 9 MG/ML
125 INJECTION, SOLUTION INTRAVENOUS CONTINUOUS
Status: DISCONTINUED | OUTPATIENT
Start: 2017-02-15 | End: 2017-02-17

## 2017-02-15 RX ORDER — FENTANYL CITRATE 50 UG/ML
50 INJECTION, SOLUTION INTRAMUSCULAR; INTRAVENOUS
Status: DISCONTINUED | OUTPATIENT
Start: 2017-02-15 | End: 2017-02-15 | Stop reason: HOSPADM

## 2017-02-15 RX ORDER — HYDROCODONE BITARTRATE AND ACETAMINOPHEN 5; 325 MG/1; MG/1
2 TABLET ORAL EVERY 4 HOURS PRN
Status: DISCONTINUED | OUTPATIENT
Start: 2017-02-15 | End: 2017-02-21 | Stop reason: HOSPADM

## 2017-02-15 RX ORDER — FAMOTIDINE 20 MG/1
20 TABLET, FILM COATED ORAL ONCE
Status: COMPLETED | OUTPATIENT
Start: 2017-02-15 | End: 2017-02-15

## 2017-02-15 RX ORDER — HYDROCHLOROTHIAZIDE 12.5 MG/1
12.5 TABLET ORAL NIGHTLY
Status: DISCONTINUED | OUTPATIENT
Start: 2017-02-15 | End: 2017-02-16

## 2017-02-15 RX ORDER — ACETAMINOPHEN 325 MG/1
650 TABLET ORAL EVERY 4 HOURS PRN
Status: DISCONTINUED | OUTPATIENT
Start: 2017-02-15 | End: 2017-02-21 | Stop reason: HOSPADM

## 2017-02-15 RX ORDER — FAMOTIDINE 10 MG/ML
20 INJECTION, SOLUTION INTRAVENOUS ONCE
Status: DISCONTINUED | OUTPATIENT
Start: 2017-02-15 | End: 2017-02-15 | Stop reason: HOSPADM

## 2017-02-15 RX ORDER — GLYCOPYRROLATE 0.2 MG/ML
INJECTION INTRAMUSCULAR; INTRAVENOUS AS NEEDED
Status: DISCONTINUED | OUTPATIENT
Start: 2017-02-15 | End: 2017-02-15 | Stop reason: SURG

## 2017-02-15 RX ORDER — FENTANYL CITRATE 50 UG/ML
INJECTION, SOLUTION INTRAMUSCULAR; INTRAVENOUS AS NEEDED
Status: DISCONTINUED | OUTPATIENT
Start: 2017-02-15 | End: 2017-02-15 | Stop reason: SURG

## 2017-02-15 RX ORDER — ATRACURIUM BESYLATE 10 MG/ML
INJECTION, SOLUTION INTRAVENOUS AS NEEDED
Status: DISCONTINUED | OUTPATIENT
Start: 2017-02-15 | End: 2017-02-15 | Stop reason: SURG

## 2017-02-15 RX ORDER — PROPOFOL 10 MG/ML
VIAL (ML) INTRAVENOUS AS NEEDED
Status: DISCONTINUED | OUTPATIENT
Start: 2017-02-15 | End: 2017-02-15 | Stop reason: SURG

## 2017-02-15 RX ORDER — LISINOPRIL 20 MG/1
20 TABLET ORAL NIGHTLY
Status: DISCONTINUED | OUTPATIENT
Start: 2017-02-15 | End: 2017-02-16

## 2017-02-15 RX ORDER — SODIUM CHLORIDE, SODIUM LACTATE, POTASSIUM CHLORIDE, CALCIUM CHLORIDE 600; 310; 30; 20 MG/100ML; MG/100ML; MG/100ML; MG/100ML
9 INJECTION, SOLUTION INTRAVENOUS CONTINUOUS
Status: DISCONTINUED | OUTPATIENT
Start: 2017-02-15 | End: 2017-02-15

## 2017-02-15 RX ORDER — LISINOPRIL AND HYDROCHLOROTHIAZIDE 20; 12.5 MG/1; MG/1
1 TABLET ORAL
Status: DISCONTINUED | OUTPATIENT
Start: 2017-02-15 | End: 2017-02-15 | Stop reason: RX

## 2017-02-15 RX ORDER — SODIUM CHLORIDE 9 MG/ML
INJECTION, SOLUTION INTRAVENOUS AS NEEDED
Status: DISCONTINUED | OUTPATIENT
Start: 2017-02-15 | End: 2017-02-15 | Stop reason: HOSPADM

## 2017-02-15 RX ORDER — ONDANSETRON 2 MG/ML
4 INJECTION INTRAMUSCULAR; INTRAVENOUS ONCE AS NEEDED
Status: DISCONTINUED | OUTPATIENT
Start: 2017-02-15 | End: 2017-02-15 | Stop reason: HOSPADM

## 2017-02-15 RX ORDER — LIDOCAINE HYDROCHLORIDE 10 MG/ML
INJECTION, SOLUTION EPIDURAL; INFILTRATION; INTRACAUDAL; PERINEURAL AS NEEDED
Status: DISCONTINUED | OUTPATIENT
Start: 2017-02-15 | End: 2017-02-15 | Stop reason: SURG

## 2017-02-15 RX ORDER — NALOXONE HCL 0.4 MG/ML
0.1 VIAL (ML) INJECTION
Status: DISCONTINUED | OUTPATIENT
Start: 2017-02-15 | End: 2017-02-21 | Stop reason: HOSPADM

## 2017-02-15 RX ORDER — SODIUM CHLORIDE 9 MG/ML
75 INJECTION, SOLUTION INTRAVENOUS CONTINUOUS
Status: DISCONTINUED | OUTPATIENT
Start: 2017-02-15 | End: 2017-02-16

## 2017-02-15 RX ORDER — NEOSTIGMINE METHYLSULFATE 1 MG/ML
INJECTION, SOLUTION INTRAVENOUS AS NEEDED
Status: DISCONTINUED | OUTPATIENT
Start: 2017-02-15 | End: 2017-02-15 | Stop reason: SURG

## 2017-02-15 RX ORDER — HYDROCHLOROTHIAZIDE 12.5 MG/1
12.5 TABLET ORAL DAILY
Status: DISCONTINUED | OUTPATIENT
Start: 2017-02-15 | End: 2017-02-15

## 2017-02-15 RX ORDER — LIDOCAINE HYDROCHLORIDE 10 MG/ML
1 INJECTION, SOLUTION EPIDURAL; INFILTRATION; INTRACAUDAL; PERINEURAL ONCE
Status: DISCONTINUED | OUTPATIENT
Start: 2017-02-15 | End: 2017-02-15 | Stop reason: HOSPADM

## 2017-02-15 RX ORDER — LISINOPRIL 20 MG/1
20 TABLET ORAL DAILY
Status: DISCONTINUED | OUTPATIENT
Start: 2017-02-15 | End: 2017-02-15

## 2017-02-15 RX ORDER — BUPIVACAINE HYDROCHLORIDE AND EPINEPHRINE 5; 5 MG/ML; UG/ML
INJECTION, SOLUTION PERINEURAL AS NEEDED
Status: DISCONTINUED | OUTPATIENT
Start: 2017-02-15 | End: 2017-02-15 | Stop reason: HOSPADM

## 2017-02-15 RX ADMIN — LIDOCAINE HYDROCHLORIDE 70 MG: 10 INJECTION, SOLUTION EPIDURAL; INFILTRATION; INTRACAUDAL; PERINEURAL at 14:38

## 2017-02-15 RX ADMIN — FENTANYL CITRATE 100 MCG: 50 INJECTION, SOLUTION INTRAMUSCULAR; INTRAVENOUS at 14:38

## 2017-02-15 RX ADMIN — DOCUSATE SODIUM AND SENNOSIDES 2 TABLET: 8.6; 5 TABLET, FILM COATED ORAL at 09:30

## 2017-02-15 RX ADMIN — HYDROMORPHONE HYDROCHLORIDE 0.5 MG: 1 INJECTION, SOLUTION INTRAMUSCULAR; INTRAVENOUS; SUBCUTANEOUS at 23:14

## 2017-02-15 RX ADMIN — HYDROMORPHONE HYDROCHLORIDE 0.5 MG: 1 INJECTION, SOLUTION INTRAMUSCULAR; INTRAVENOUS; SUBCUTANEOUS at 17:16

## 2017-02-15 RX ADMIN — LIDOCAINE HYDROCHLORIDE 1 ML: 10 INJECTION, SOLUTION EPIDURAL; INFILTRATION; INTRACAUDAL; PERINEURAL at 14:45

## 2017-02-15 RX ADMIN — SODIUM CHLORIDE 75 ML/HR: 9 INJECTION, SOLUTION INTRAVENOUS at 19:09

## 2017-02-15 RX ADMIN — PROPOFOL 150 MG: 10 INJECTION, EMULSION INTRAVENOUS at 14:38

## 2017-02-15 RX ADMIN — GLYCOPYRROLATE 0.4 MG: 0.2 INJECTION, SOLUTION INTRAMUSCULAR; INTRAVENOUS at 15:15

## 2017-02-15 RX ADMIN — ATRACURIUM BESYLATE 30 MG: 10 INJECTION, SOLUTION INTRAVENOUS at 14:38

## 2017-02-15 RX ADMIN — TAZOBACTAM SODIUM AND PIPERACILLIN SODIUM 4.5 G: .5; 4 INJECTION, POWDER, LYOPHILIZED, FOR SOLUTION INTRAVENOUS at 09:30

## 2017-02-15 RX ADMIN — FENTANYL CITRATE 50 MCG: 50 INJECTION, SOLUTION INTRAMUSCULAR; INTRAVENOUS at 17:10

## 2017-02-15 RX ADMIN — HEPARIN SODIUM 5000 UNITS: 5000 INJECTION, SOLUTION INTRAVENOUS; SUBCUTANEOUS at 20:56

## 2017-02-15 RX ADMIN — HYDROCODONE BITARTRATE AND ACETAMINOPHEN 2 TABLET: 5; 325 TABLET ORAL at 19:43

## 2017-02-15 RX ADMIN — SODIUM CHLORIDE, POTASSIUM CHLORIDE, SODIUM LACTATE AND CALCIUM CHLORIDE 9 ML/HR: 600; 310; 30; 20 INJECTION, SOLUTION INTRAVENOUS at 14:13

## 2017-02-15 RX ADMIN — Medication 10 ML: at 14:12

## 2017-02-15 RX ADMIN — HYDROMORPHONE HYDROCHLORIDE 0.5 MG: 1 INJECTION, SOLUTION INTRAMUSCULAR; INTRAVENOUS; SUBCUTANEOUS at 16:28

## 2017-02-15 RX ADMIN — FAMOTIDINE 20 MG: 20 TABLET ORAL at 14:12

## 2017-02-15 RX ADMIN — ONDANSETRON 4 MG: 2 INJECTION INTRAMUSCULAR; INTRAVENOUS at 23:30

## 2017-02-15 RX ADMIN — HYDROCHLOROTHIAZIDE 12.5 MG: 12.5 TABLET ORAL at 20:56

## 2017-02-15 RX ADMIN — HEPARIN SODIUM 5000 UNITS: 5000 INJECTION, SOLUTION INTRAVENOUS; SUBCUTANEOUS at 14:35

## 2017-02-15 RX ADMIN — TAZOBACTAM SODIUM AND PIPERACILLIN SODIUM 4.5 G: .5; 4 INJECTION, POWDER, LYOPHILIZED, FOR SOLUTION INTRAVENOUS at 20:56

## 2017-02-15 RX ADMIN — FAMOTIDINE 20 MG: 10 INJECTION, SOLUTION INTRAVENOUS at 15:00

## 2017-02-15 RX ADMIN — DOCUSATE SODIUM AND SENNOSIDES 2 TABLET: 8.6; 5 TABLET, FILM COATED ORAL at 18:35

## 2017-02-15 RX ADMIN — DOCUSATE SODIUM 100 MG: 100 CAPSULE, LIQUID FILLED ORAL at 09:30

## 2017-02-15 RX ADMIN — DOCUSATE SODIUM 100 MG: 100 CAPSULE, LIQUID FILLED ORAL at 18:35

## 2017-02-15 RX ADMIN — TAZOBACTAM SODIUM AND PIPERACILLIN SODIUM 4.5 G: .5; 4 INJECTION, POWDER, LYOPHILIZED, FOR SOLUTION INTRAVENOUS at 04:43

## 2017-02-15 RX ADMIN — HYDROMORPHONE HYDROCHLORIDE 0.5 MG: 1 INJECTION, SOLUTION INTRAMUSCULAR; INTRAVENOUS; SUBCUTANEOUS at 16:15

## 2017-02-15 RX ADMIN — HEPARIN SODIUM 5000 UNITS: 5000 INJECTION, SOLUTION INTRAVENOUS; SUBCUTANEOUS at 05:39

## 2017-02-15 RX ADMIN — HYDROMORPHONE HYDROCHLORIDE 0.5 MG: 1 INJECTION, SOLUTION INTRAMUSCULAR; INTRAVENOUS; SUBCUTANEOUS at 20:59

## 2017-02-15 RX ADMIN — LISINOPRIL 20 MG: 20 TABLET ORAL at 20:56

## 2017-02-15 RX ADMIN — NEOSTIGMINE METHYLSULFATE 2 MG: 1 INJECTION, SOLUTION INTRAVENOUS at 15:15

## 2017-02-15 RX ADMIN — FENTANYL CITRATE 50 MCG: 50 INJECTION, SOLUTION INTRAMUSCULAR; INTRAVENOUS at 16:39

## 2017-02-15 RX ADMIN — KETOROLAC TROMETHAMINE 30 MG: 30 INJECTION, SOLUTION INTRAMUSCULAR at 05:45

## 2017-02-15 RX ADMIN — ONDANSETRON 4 MG: 2 INJECTION INTRAMUSCULAR; INTRAVENOUS at 15:05

## 2017-02-15 NOTE — PROGRESS NOTES
Discharge Planning Assessment  [facility]     Patient Name: Antonino Barrera  MRN: [id number]  Today's Date: 2/15/2017    Admit Date: 2/14/2017          Discharge Needs Assessment       02/15/17 1304    Living Environment    Lives With alone    Living Arrangements house    Provides Primary Care For no one    Quality Of Family Relationships supportive    Able to Return to Prior Living Arrangements yes    Discharge Needs Assessment    Concerns To Be Addressed no discharge needs identified;denies needs/concerns at this time    Equipment Currently Used at Home none    Equipment Needed After Discharge none    Transportation Available car;family or friend will provide            Discharge Plan       02/15/17 1309    Case Management/Social Work Plan    Plan Home    Patient/Family In Agreement With Plan yes    Additional Comments Spoke with patient at bedside. He lives alone in a one story house in [facility]. He is independent with ADL's and mobility. Denies DME, HH, and outpatient services. His pharmacy is [email or web address] [address] in [place]. Denies needs at this time. Family will transport at [place]. Goal is to return home. CM will follow.         Discharge Placement     No information found                Demographic Summary       02/15/17 1259    Referral Information    Arrived From home or self-care    Reason For Consult discharge planning    Primary Care Physician Information    Name Dr. Betts            Functional Status       02/15/17 1302    Functional Status Prior    Ambulation 0-->independent    Transferring 0-->independent    Toileting 0-->independent    Bathing 0-->independent    Dressing 0-->independent    Eating 0-->independent    Communication 0-->understands/communicates without difficulty    Swallowing 0-->swallows foods/liquids without difficulty    Activity Tolerance    Usual Activity Tolerance good    Current Activity Tolerance moderate            Psychosocial     None            Abuse/Neglect      None            Legal     None            Substance Abuse     None            Patient Forms     None          Corine Simon RN

## 2017-02-15 NOTE — PLAN OF CARE
Problem: Cholecystitis/Cholecystectomy (Adult)  Goal: Signs and Symptoms of Listed Potential Problems Will be Absent or Manageable (Cholecystitis/Cholecystectomy)  Outcome: Ongoing (interventions implemented as appropriate)    02/15/17 0631   Cholecystitis/Cholecystectomy   Problems Assessed (Cholecystitis/Cholecystectomy) pain   Problems Present (Cholecystitis/Cholecystectomy) pain

## 2017-02-15 NOTE — CONSULTS
"Antonino Barrera  [id number]  1955       Patient Care Team:  No Known Provider as PCP - General  No Known Provider as PCP - Family Medicine        General Surgery Consult  Date 2/15/17  Consultant Dr Donnelly    Chief complaint right upper quadrant pain    Subjective     Patient is a 61 y.o. male presents with right upper quadrant pain. Onset of symptoms was abrupt starting 1 day ago.  Symptoms are associated with mild nausea no vomiting.  Patient had a similar episode about 3-4 months ago.  Workup at that time with gallbladder ultrasound was unremarkable.  He has been on Zofran as needed.  He has had a few episodes of biliary colic since.  Last episode yesterday was relatively severe which prompted evaluation in the emergency room.  Liver function tests were unremarkable and CT scan of the abdomen and pelvis showed a stone almost 9 mm in the neck of the gallbladder.  Gallbladder ultrasound however showed no evidence of stones.  Overall he admits feeling better since admission.  He has a strong family history of gallbladder disease.  No previous abdominal surgeries.  He has noticed an umbilical hernia last week while straining.  No complaints of diarrhea.    Allergy: No Known Allergies    Home Medications:   No current facility-administered medications on file prior to encounter.      No current outpatient prescriptions on file prior to encounter.       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 Social ETOH  Works in construction    Review of Systems   Pertinent items are noted in HPI      Physical Exam:    Objective     Vital Signs  Blood pressure 131/82, pulse 68, temperature 97.9 °F (36.6 °C), temperature source Oral, resp. rate 16, height 69\" (175.3 cm), weight 200 lb (90.7 kg), SpO2 " 93 %.    Intake/Output Summary (Last 24 hours) at 02/15/17 1350  Last data filed at 02/15/17 0900   Gross per 24 hour   Intake    200 ml   Output   1325 ml   Net  -1125 ml       Physical Exam:      General Appearance:    Alert, cooperative, in no acute distress   Head:    Normocephalic, without obvious abnormality, atraumatic   Eyes:            Lids and lashes normal, conjunctivae and sclerae normal, no   icterus, no pallor, corneas clear, PERRLA   Ears:    Ears appear intact with no abnormalities noted   Throat:   No oral lesions, no thrush, oral mucosa moist   Neck:   No adenopathy, supple, trachea midline, no thyromegaly, no   carotid bruit, no JVD   Back:     No kyphosis present, no scoliosis present, no skin lesions,      erythema or scars, no tenderness to percussion or                   palpation,   range of motion normal   Lungs:     Clear to auscultation,respirations regular, even and                  unlabored    Heart:    Regular rhythm and normal rate, normal S1 and S2, no            murmur, no gallop, no rub, no click   Chest Wall:    No abnormalities observed   Abdomen:     Normal bowel sounds, no masses, no organomegaly, soft       Tender, in RUQ.non-distended, no guarding, no rebound                Tenderness. Small umbilical hernia. Tender.   Rectal:     Deferred   Extremities:   Moves all extremities well, no edema, no cyanosis, no             redness   Pulses:   Pulses palpable and equal bilaterally   Skin:   No bleeding, bruising or rash   Lymph nodes:   No palpable adenopathy   Neurologic:   Cranial nerves 2 - 12 grossly intact, sensation intact, DTR       present and equal bilaterally       Results Review:    I reviewed the patient's new clinical results.    Results from last 7 days  Lab Units 02/14/17  1108   WBC 10*3/mm3 9.20   HEMOGLOBIN g/dL 16.2   HEMATOCRIT % 46.9   PLATELETS 10*3/mm3 254       Results from last 7 days  Lab Units 02/15/17  0439   SODIUM mmol/L 136   POTASSIUM mmol/L 4.0    CHLORIDE mmol/L 105   TOTAL CO2 mmol/L 29.0   BUN mg/dL 14   CREATININE mg/dL 1.00   GLUCOSE mg/dL 96   CALCIUM mg/dL 9.4       ASSESSMENT AND PLAN: Symptomatic cholelithiasis with a stone in the neck of the gallbladder visualized on CT scan but not ultrasound.  Clinically stable  I recommend proceeding with laparoscopic cholecystectomy and possible cholangiogram and possible open cholecystectomy as well as repair of umbilical hernia.  I discussed the procedure at length with the patient and his wife.  The risks of anesthesia infection, bleeding, DVT, liver, bile duct injury as well as bowel injury were discussed at length  He understands and  is willing to proceed with the surgery      Principal Problem:    Cholecystitis  Active Problems:    Right ureteral stone    Right upper quadrant abdominal pain    HTN (hypertension)    Gall stones        I discussed the patients findings and my recommendations with patient and family.   Thank you for the consultation.    Ryan Corona MD  02/15/17  1:50 PM

## 2017-02-15 NOTE — OP NOTE
DATE OF PROCEDURE:  02/15/2017    PREOPERATIVE DIAGNOSES:  1. Symptomatic cholelithiasis.   2. Umbilical hernia.    POSTOPERATIVE DIAGNOSES:  1. Symptomatic cholelithiasis.   2. Umbilical hernia.   3. Small left inguinal hernia.     PROCEDURES PERFORMED:  1. Laparoscopic cholecystectomy.   2. Repair of umbilical hernia.     SURGEON: Ryan Corona MD     ANESTHESIA: General.     COMPLICATIONS: None.     FINDINGS: The patient had mildly inflamed gallbladder with a stone in the neck of the gallbladder. A small left inguinal hernia was also appreciated.     INDICATIONS: The patient is a pleasant 61-year-old gentleman who was admitted last night with complaints of abdominal pain, mostly in the right upper quadrant that was acute in onset. He has had some complaints of biliary colic over the last 3-4 months and has had a gallbladder ultrasound a few months ago that was unremarkable. He presented to the emergency room here where he was noted to have normal liver function test. CT scan of the abdomen and pelvis was obtained that showed a stone in the neck of the gallbladder and a gallbladder ultrasound showed no evidence of stones, most likely due to the fact that the patient has some kind of pathological very end of the neck of the gallbladder that could not be appreciated on ultrasound. He was admitted, hydrated and has been on Zosyn, and is taken to the operating room today for laparoscopic cholecystectomy and was also noted to have an umbilical hernia that he noticed about a week ago that I plan to fix at the same time.     DESCRIPTION OF PROCEDURE: The patient was taken to the operating room by anesthesia and placed supine on the table. Following induction of general endotracheal anesthesia, TEDs and SCDs were placed. He received 5000 units of subcutaneous heparin. He already received Zosyn on the floor. The abdomen was then prepped and draped in a sterile fashion. A timeout was observed. Marcaine 0.5% with  epinephrine was injected in the infraumbilical region. A small transverse incision was made and dissection was carried to expose the stalk of the umbilicus which was transected to reveal a defect that measured about 1 cm. This was used to introduce the Ana Paula. The abdomen was then insufflated to 15 mmHg using CO2. A 10 mm scope was advanced. He was noted to have a mass over the bowel and mesentery that measured about 1 cm. The patient was then placed in reverse Trendelenburg position right side up. An 11 mm trocar was placed in the epigastric region and two 5 mm trocars were placed in the right upper quadrant under direct vision. The mass was removed with a plan to send it to pathology as well. It was not attached to any structures. We then exposed the gallbladder. The fundus was pulled over the liver bed. Some adhesions overlying the anterior wall of the gallbladder were taken down to expose the infundibulum, which was pulled inferolaterally. With gentle dissection, the cystic duct was identified. It was not dilated. The junction of the common bile duct was noted. The cystic duct was then clipped with multiple clips and transected as was the cystic artery and the gallbladder was removed off of the liver bed with cautery, placed in an Endo Catch bag and delivered out of the abdomen through the umbilical port intact and sent to pathology. A stone was appreciated in the neck of the gallbladder. The liver bed was inspected. It was noted to be under adequate hemostasis. The clips were intact with no evidence of bile leak or bleeding noted. The site was well irrigated with normal saline with clear return of fluid noted. Note that the abdomen was then inspected while the patient was in Trendelenburg position. I could appreciate a small left inguinal hernia. The trocars were then removed under direct vision with no bleeding noted. The umbilical fascia was then approximated transversely with multiple interrupted 0 Ethibond  sutures. There was no tension on the repair. The wound was then irrigated. The umbilicus was anchored to the underlying fascia with 3-0 Vicryl suture. The subcutaneous tissue was approximated with interrupted 2-0 Vicryl sutures. The skin incisions were then approximated with 4-0 Monocryl subcuticular suture. Steri-Strips and sterile dressings were applied. Note that the repair of the umbilicus encompassed about 20% of the case. The patient tolerated the procedure well with no complications. She was extubated and taken to the recovery room in stable condition. Sponge count and needle count were correct at the end of the procedure.       MD SAMI Navas/florence  DD: 02/15/2017 15:51:27  DT: 02/15/2017 18:19:31  Voice Rec. ID #[id number]  Voice Original ID #[id number]  Doc ID #[id number]  Rev. #0  cc:

## 2017-02-15 NOTE — BRIEF OP NOTE
CHOLECYSTECTOMY LAPAROSCOPIC POSSIBLE OPEN CHOLECYSTECTOMY  Procedure Note    Antonino Barrera  2/14/2017 - 2/15/2017    Pre-op Diagnosis:   cholelithiasis    Post-op Diagnosis:     Post-Op Diagnosis Codes:     * Cholelithiasis [K80.20]    Procedure/CPT® Codes:      Procedure(s):  CHOLECYSTECTOMY LAPAROSCOPIC ,UMBILICAL HERNIA REPAIR     Surgeon(s):  Ryan Corona MD    Anesthesia: General    Staff:   Circulator: Corine Thacker RN  Scrub Person: Vandana Khan    Estimated Blood Loss: * No values recorded between 2/15/2017  2:31 PM and 2/15/2017  3:36 PM *    Specimens:                  ID Type Source Tests Collected by Time Destination   A :  Tissue Gallbladder TISSUE EXAM Ryan Corona MD 2/15/2017 1509          Drains:           Findings: distended GB with a stone in the neck    Complications: none      Ryan Corona MD     Date: 2/15/2017  Time: 3:36 PM

## 2017-02-15 NOTE — PLAN OF CARE
Problem: Patient Care Overview (Adult)  Goal: Plan of Care Review  Outcome: Ongoing (interventions implemented as appropriate)    02/15/17 0631   Coping/Psychosocial Response Interventions   Plan Of Care Reviewed With patient   Patient Care Overview   Progress progress toward functional goals as expected

## 2017-02-15 NOTE — PAYOR COMM NOTE
"Antonino Barrera (61 y.o. Male) INITIAL NOTIFICATION AND CLINICALS.    Date of Birth Social Security Number Address Home Phone MRN    1955  [id number] [place] [address] [phone] [id number]    Sikhism Marital Status          None Single       Admission Date Admission Type Admitting Provider Attending Provider Department, Room/Bed    2/14/17 Emergency Robert, MD Cony Peacock Phonekeo, MD [facility] OR, [place]ONNE OR/NONE    Discharge Date Discharge Disposition Discharge Destination                      Attending Provider: Madalyn Donnelly MD     Allergies:  No Known Allergies    Isolation:  None   Infection:  None   Code Status:  FULL    Ht:  69\" (175.3 cm)   Wt:  200 lb (90.7 kg)    Admission Cmt:  None   Principal Problem:  Cholecystitis [K81.9]                 Active Insurance as of 2/14/2017     Primary Coverage     Payor Plan Insurance Group Employer/Plan Group    [employer] [place] [employer] [place]ICA[place]      Payor Plan Address Payor Plan Phone Number Effective From Effective To    [address] [address] [phone] 2/14/2017     [place] [address]       Subscriber Name Subscriber Birth Date Member ID       ANTONINO BARRERA 1955 [id number]                 Emergency Contacts      (Rel.) Home Phone Work Phone Mobile Phone    Elaine Johnson (Sister) [phone] -- --    AlexPaul -- -- [phone]               History & Physical      Shawn [place] MD at 2/14/2017  5:53 PM              [facility]  HISTORY AND PHYSICAL    Primary Care Physician: No Known Provider    Subjective     Chief Complaint: abd pain     History of Present Illness:   61 y.o male with PMH of HTN, and multiple kidney stones presented to the ED today with c/o right sided abdominal pain that woke him this morning from sleep. Patient began having aching with bloating and pressure, causing nausea that began in his epigastric area and moved into his RUQ and " around to his back/right flank area. States he took Zofran and was able to lay back down within a few hours with lessened pain. Patient continued to have these symptoms throughout the day and noted that it was hard for him to drive himself to [place] due to his back hitting his car seat. Called his PCP to ask about symptom relief and was told to go to ED to be evaluated. CT scan in ED showed non obstructing nephroliths with a non obstructing stone in the distal portion of the right ureter, and a 9 mm stone lodged in the gallbladder neck. Gall bladder US showed mild thickening of the gall bladder wall. Started on IV pain medications and zosyn. Asked to be admitted to hospital medicine for continued monitoring and treatment.   Denies f/c, v/d, cough, SOA, palpitations, or CP.     Review of Systems   Constitutional: Negative for activity change, appetite change, chills, diaphoresis, fatigue and fever.   HENT: Negative for congestion, mouth sores, postnasal drip, rhinorrhea, sore throat, tinnitus, trouble swallowing and voice change.    Eyes: Negative for photophobia, redness and visual disturbance.   Respiratory: Negative for cough, choking, chest tightness, shortness of breath, wheezing and stridor.    Cardiovascular: Positive for leg swelling. Negative for chest pain and palpitations.   Gastrointestinal: Positive for abdominal pain, constipation and nausea. Negative for abdominal distention, blood in stool, diarrhea and vomiting.   Endocrine: Negative for polydipsia, polyphagia and polyuria.   Genitourinary: Positive for flank pain. Negative for decreased urine volume, difficulty urinating, dysuria, frequency and hematuria.   Musculoskeletal: Positive for back pain. Negative for gait problem, joint swelling, myalgias, neck pain and neck stiffness.   Skin: Negative for color change, pallor, rash and wound.   Neurological: Negative for dizziness, tremors, syncope, light-headedness, numbness and headaches.  "  Hematological: Negative for adenopathy.   Psychiatric/Behavioral: Positive for sleep disturbance. Negative for agitation, behavioral problems and confusion. The patient is not nervous/anxious.       Otherwise complete ROS performed and negative except as mentioned in the HPI.    Past Medical History:   Past Medical History   Diagnosis Date   • DVT of leg (deep venous thrombosis)      over 10 years ago after trauma to left leg    • Hypertension    • Kidney stone    • Meniere disease        Past Surgical History:  Past Surgical History   Procedure Laterality Date   • Knee surgery         Family History: family history is not on file.   Reviewed and noncontributory    Social History:  reports that he has quit smoking. He does not have any smokeless tobacco history on file. He reports that he drinks alcohol. He reports that he does not use illicit drugs.    Medications:  Prescriptions Prior to Admission   Medication Sig Dispense Refill Last Dose   • lisinopril-hydrochlorothiazide (PRINZIDE,ZESTORETIC) 20-12.5 MG per tablet Take 1 tablet by mouth Daily.          Allergies:  No Known Allergies      Objective     Physical Exam:  Vital Signs:   Visit Vitals   • /82   • Pulse 66   • Temp 97.6 °F (36.4 °C) (Oral)   • Resp 20   • Ht 69\" (175.3 cm)   • Wt 200 lb (90.7 kg)   • SpO2 98%   • BMI 29.53 kg/m2     Physical Exam   Constitutional: He is oriented to person, place, and time. He appears well-developed and well-nourished. No distress.   HENT:   Head: Normocephalic and atraumatic.   Mouth/Throat: Oropharynx is clear and moist.   Eyes: Conjunctivae are normal. Pupils are equal, round, and reactive to light. No scleral icterus.   Neck: Neck supple. No thyromegaly present.   Cardiovascular: Normal rate, regular rhythm, normal heart sounds and intact distal pulses.  Exam reveals no gallop and no friction rub.    No murmur heard.  Pulmonary/Chest: Effort normal and breath sounds normal. No stridor. No respiratory " distress. He has no wheezes. He has no rales.   Abdominal: Soft. Bowel sounds are normal. He exhibits no distension. There is no hepatosplenomegaly. There is tenderness in the right upper quadrant and epigastric area. There is no rigidity, no rebound, no guarding and no CVA tenderness. A hernia (soft, reducible ) is present.   Obese    Musculoskeletal: He exhibits no edema or tenderness.   Lymphadenopathy:     He has no cervical adenopathy.   Neurological: He is alert and oriented to person, place, and time. No cranial nerve deficit.   Skin: Skin is warm and dry. No rash noted. He is not diaphoretic. No cyanosis or erythema. No pallor. Nails show no clubbing.   Small horizontal reddened abrasion on LLQ from tape   Psychiatric: He has a normal mood and affect. His behavior is normal.   Vitals reviewed.      Results Reviewed:    Results from last 7 days  Lab Units 02/14/17  1108   WBC 10*3/mm3 9.20   HEMOGLOBIN g/dL 16.2   PLATELETS 10*3/mm3 254       Results from last 7 days  Lab Units 02/14/17  1108   SODIUM mmol/L 136   POTASSIUM mmol/L 4.0   TOTAL CO2 mmol/L 29.0   CREATININE mg/dL 0.90   GLUCOSE mg/dL 126*   CALCIUM mg/dL 10.3   Results for CYNTHIA AG (MRN [id number]) as of 2/14/2017 16:45   Ref. Range 2/14/2017 11:08   Lipase Latest Ref Range: 6 - 51 U/L 33     EXAMINATION: CT ABDOMEN PELVIS WO CONTRAST- 02/14/2017      INDICATION: Right flank pain radiating to right lower quadrant. History  of kidney stones.      TECHNIQUE: Multislice helical CT with two-dimensional reformatted  images.      The radiation dose reduction device was turned on for each scan per the  ALARA (As Low as Reasonably Achievable) protocol.      COMPARISON: NONE      FINDINGS:   Mild atelectasis is present in the lower lobes. The heart size is  normal. There is transmural thickening in the distal third of the  esophagus.      The hepatic parenchyma is free of masses. The gallbladder contains a  stone lodged in the neck measuring 9 mm  in diameter. The spleen,  pancreas, and adrenal glands are normal. The renal contours are smooth.  Nonobstructing nephroliths are present bilaterally. There is no  significant hydronephrosis. The course and caliber of the ureters are  normal. A punctate density is present in the distal aspect of the right  ureter which does not appear to be obstructing. There are no  hyperdensities in the bladder. The right colon contains stool. The  appendix is normal. There is no free pelvic fluid. The prostate gland is  normal. The sigmoid colon contains a few diverticula. There is no  significant inguinal adenopathy.      The osseous structures of the abdomen and pelvis are normal.      IMPRESSION:  1. Bilateral nonobstructing nephroliths.  2. Small punctate speck of calcification and/or stone in the distal  portion of the right ureter; nonobstructing.  3. 9 mm stone lodged in the gallbladder neck.      Study Result      EXAMINATION: US GALLBLADDER-02/14/2017:      INDICATION: R/O cholecystitis; N20.1-Calculus of ureter; K80.20-Calculus  of gallbladder without cholecystitis without obstruction, abdominal pain  radiating into the back x 1 day.      TECHNIQUE: Multiple images through the right upper quadrant were  obtained with a 4 MHz probe.      COMPARISON: NONE.      FINDINGS: The visible portions of the pancreatic head are normal. The  pancreatic body and tail are not visible.      The hepatic parenchyma is of increased density. There are no hepatic  masses in the liver.      The gallbladder lumen is free of echogenic foci. The gallbladder wall  measures 3.9 mm in thickness. There is some thickening along the wall of  the gallbladder which does not demonstrate significant posterior  shadowing.      The common bile duct measures 3.7 mm in diameter. There is suggestion of  some calcification within the common bile duct.      The right kidney measures 11.5 x 5.5 x 5.6 cm. There is no renal mass  or  hydronephrosis.      IMPRESSION:  1. Mild thickening of the gallbladder wall.  2. Some echogenic debris within the gallbladder without evidence of  shadowing stones.  3. Shadowing echogenic focus in the proximal common bile duct; possible  small stones.  4. Dense hepatic parenchyma.       I have personally reviewed and interpreted available lab data, radiology studies and ECG obtained at time of admission.     Assessment / Plan     Assessment/Problem List:   Principal Problem:    Cholecystitis  Active Problems:    Right ureteral stone    Right upper quadrant abdominal pain    HTN (hypertension)    Gall stones    Plan:  Admit to hospital medicine  Regular diet tonight  NPO after MN  General surgery consult  PRN IV toradol   Cont zosyn for now   BMP/ Mag in am   Cont home BP medicine     DVT prophylaxis: Hep SQ  Code Status:FULL     Anamika Sales, APRN 02/14/17 6:21 PM      Brief Attending Note   I have seen and examined the patient, performing an independent face-to-face diagnostic evaluation with plan of care reviewed and developed with the advanced practice clinician (APC).    Brief Summary Statement/HPI:   The patient is a 61-year-old male with some chronic abdominal pain who presents with acute right upper quadrant and flank pain.  Associated with some mild nausea, no vomiting, and changes in bowel movements.  Workup in the emergency room is shown a 9 mm stone lodged in the neck gallbladder along with some mild gallbladder wall thickening.  There is also bilateral kidney stones and a very small nonobstructing stone in the right ureter.  Currently he is comfortable after pain medicines.  No history of fevers.  Has had recurrent symptoms over the last 2-3 months that was not seen a physician.  He's been admitted further evaluation.    Attending Physical Exam:  Temp:  [97.6 °F (36.4 °C)] 97.6 °F (36.4 °C)  Heart Rate:  [45-74] 66  Resp:  [20] 20  BP: (128-178)/(81-86) 142/82     Constitutional: no acute  distress, awake, alert  Eyes: PERRLA, sclerae anicteric, no conjunctival injection  Neck: supple, no thyromegaly, trachea midline  Respiratory: Clear to auscultation bilaterally, nonlabored respirations   Cardiovascular: RRR, no murmurs, rubs, or gallops, palpable pedal pulses bilaterally  Gastrointestinal: Positive bowel sounds, some mild RUQ tenderness to deep palpation, negative sandra's sign  Musculoskeletal: No bilateral ankle edema, no clubbing or cyanosis to bilateral lower extremities  Psychiatric: oriented x 3, appropriate affect, cooperative  Neurologic: Strength symmetric in all extremities, Cranial Nerves grossly intact    Brief Assessment/Plan :  Patient presents with some right upper quadrant abdominal pain and evidence of a stone in the neck gallbladder along with some mild gallbladder wall thickening.  I suspect he has some degree of chronic cholecystitis.  We'll continue with empiric Zosyn.  We'll have general surgery evaluate him in the morning.  Currently LFTs are within normal limits but will recheck in the morning.  Symptoms and exam normal consistent with a biliary cause rather than the small right ureteral stone.    See above for further detailed assessment and plan developed with APC which I have reviewed and/or edited.    I believe this patient meets OBSERVATION status, however if further evaluation or treatment plans warrant, status may change.  Based upon current information, I predict patient's care encounter to be less than or equal to 2 midnights.      Shawn Rome MD  02/14/17  6:26 PM              Electronically signed by Shawn Rome MD at 2/14/2017  6:31 PM           Emergency Department Notes      Meng Taylor MD at 2/14/2017 11:29 AM          Subjective   HPI Comments: Antonino Barrera is a 61 y.o. male presenting to the ED with c/o abdominal and flank pain. Mr. Barrera reports that he has been having pain in his right flank since 0400 this morning. He reports the pain  radiating down to his right lower quadrant. The pain worsens with movement. He has a previous history of kidney stones, and reports that the pain today feels different to his previous episodes. He also reports feeling nauseated, reporting mild relief with Zofran. Denies any vomiting, diarrhea, hematuria, dysuria, fevers, or chills. No other complaints at this time.    Patient is a 61 y.o. male presenting with abdominal pain.   History provided by:  Patient  Abdominal Pain   Pain location:  R flank  Pain radiates to:  RLQ  Pain severity:  Moderate  Onset quality:  Sudden  Duration:  8 hours  Timing:  Constant  Progression:  Worsening  Chronicity:  Recurrent  Relieved by:  Nothing  Worsened by:  Movement  Associated symptoms: nausea    Associated symptoms: no chest pain, no chills, no cough, no diarrhea, no dysuria, no fatigue, no fever, no hematemesis, no hematochezia, no hematuria, no melena, no shortness of breath and no vomiting    Risk factors: has not had multiple surgeries        Review of Systems   Constitutional: Negative for chills, fatigue and fever.   HENT: Negative for rhinorrhea.    Respiratory: Negative for cough and shortness of breath.    Cardiovascular: Negative for chest pain and leg swelling.   Gastrointestinal: Positive for abdominal pain and nausea. Negative for diarrhea, hematemesis, hematochezia, melena and vomiting.   Genitourinary: Positive for flank pain. Negative for dysuria and hematuria.   Musculoskeletal: Negative for back pain and joint swelling.   Skin: Negative for color change.   Neurological: Negative for dizziness, syncope, weakness, light-headedness, numbness and headaches. other systems reviewed and are negative.      Past Medical History   Diagnosis Date   • DVT of leg (deep venous thrombosis)      over 10 years ago after trauma to left leg    • Hypertension    • Kidney stone    • Meniere disease        No Known Allergies    Past Surgical History   Procedure Laterality Date   •  Knee surgery         History reviewed. No pertinent family history.    Social History     Social History   • Marital status: Single     Spouse name: N/A   • Number of children: N/A   • Years of education: N/A     Social History Main Topics   • Smoking status: Former Smoker   • Smokeless tobacco: None   • Alcohol use Yes      Comment: occasional / on weekends    • Drug use: No   • Sexual activity: Not Asked     Other Topics Concern   • None     Social History Narrative   • None         Objective   Physical Exam   Constitutional: He is oriented to person, place, and time. He appears well-developed and well-nourished. No distress.   HENT:   Head: Normocephalic and atraumatic.   Eyes: Conjunctivae and EOM are normal.   Neck: Normal range of motion. Neck supple.   Cardiovascular: Normal rate, regular rhythm, normal heart sounds and intact distal pulses.    Pulmonary/Chest: Effort normal and breath sounds normal. No respiratory distress. He exhibits no tenderness.   Abdominal: Soft. Bowel sounds are normal. There is tenderness (mild) in the left lower quadrant.   Musculoskeletal: Normal range of motion. He exhibits no edema or tenderness.   Neurological: He is alert and oriented to person, place, and time.   Skin: Skin is warm and dry. No erythema.   Psychiatric: He has a normal mood and affect. His behavior is normal. note and vitals reviewed.      Procedures        ED Course  ED Course   Comment By Time   The voice clip for Mr. Barrera's CAT scan indicates a small distal right ureteral stone.  It also indicates a gallstone.  On CT the gallstone appeared to be lodged in the neck of the gallbladder.  His symptoms are more consistent with renal colic however.  We will obtain ultrasound of his gallbladder to further evaluate Meng Taylor MD 02/14 5478   Mr. Barrera declines any further pain medication and continues to appear comfortable.  I spoke with him and his wife about findings and plan. Meng Taylor MD 02/14 2265    Voice clip regarding her ultrasound indicates gallbladder wall thickening and possible stones in the proximal common bile duct.  We will give IV antibiotics and admit to the hospital for further evaluation. Meng Taylor MD 02/14 6700   I spoke with Mr. Barrera and his family about ultrasound findings.  He tells me he is comfortable at this point.  I have paged the hospitalist Meng Taylor MD 02/14 1600   Spoke with Dr. Jones, the hospitalist who will admit Mr. Barrera to the hospital. -DBT Balbir WHITTAKER Maureen 02/14 1613               Recent Results (from the past 24 hour(s))   Comprehensive Metabolic Panel    Collection Time: 02/14/17 11:08 AM   Result Value Ref Range    Glucose 126 (H) 70 - 100 mg/dL    BUN 13 9 - 23 mg/dL    Creatinine 0.90 0.60 - 1.30 mg/dL    Sodium 136 132 - 146 mmol/L    Potassium 4.0 3.5 - 5.5 mmol/L    Chloride 102 99 - 109 mmol/L    CO2 29.0 20.0 - 31.0 mmol/L    Calcium 10.3 8.7 - 10.4 mg/dL    Total Protein 7.2 5.7 - 8.2 g/dL    Albumin 4.50 3.20 - 4.80 g/dL    ALT (SGPT) 32 7 - 40 U/L    AST (SGOT) 29 0 - 33 U/L    Alkaline Phosphatase 63 25 - 100 U/L    Total Bilirubin 0.8 0.3 - 1.2 mg/dL    eGFR Non African Amer 86 >60 mL/min/1.73    Globulin 2.7 gm/dL    A/G Ratio 1.7 1.5 - 2.5 g/dL    BUN/Creatinine Ratio 14.4 7.0 - 25.0    Anion Gap 5.0 3.0 - 11.0 mmol/L   Lipase    Collection Time: 02/14/17 11:08 AM   Result Value Ref Range    Lipase 33 6 - 51 U/L   Urinalysis With / Culture If Indicated    Collection Time: 02/14/17 11:08 AM   Result Value Ref Range    Color, UA Yellow Yellow, Straw    Appearance, UA Clear Clear    pH, UA 6.0 5.0 - 8.0    Specific Gravity, UA 1.025 1.005 - 1.030    Glucose, UA Negative Negative    Ketones, UA Negative Negative    Bilirubin, UA Negative Negative    Blood, UA Trace (A) Negative    Protein, UA Negative Negative    Leuk Esterase, UA Negative Negative    Nitrite, UA Negative Negative    Urobilinogen, UA 0.2 E.U./dL 0.2 - 1.0 E.U./dL   Light Blue Top     Collection Time: 02/14/17 11:08 AM   Result Value Ref Range    Extra Tube hold for add-on    Green Top (Gel)    Collection Time: 02/14/17 11:08 AM   Result Value Ref Range    Extra Tube Hold for add-ons.    Lavender Top    Collection Time: 02/14/17 11:08 AM   Result Value Ref Range    Extra Tube hold for add-on    Gold Top - SST    Collection Time: 02/14/17 11:08 AM   Result Value Ref Range    Extra Tube Hold for add-ons.    CBC Auto Differential    Collection Time: 02/14/17 11:08 AM   Result Value Ref Range    WBC 9.20 3.50 - 10.80 10*3/mm3    RBC 5.07 4.20 - 5.76 10*6/mm3    Hemoglobin 16.2 13.1 - 17.5 g/dL    Hematocrit 46.9 38.9 - 50.9 %    MCV 92.5 80.0 - 99.0 fL    MCH 32.0 (H) 27.0 - 31.0 pg    MCHC 34.5 32.0 - 36.0 g/dL    RDW 12.5 11.3 - 14.5 %    RDW-SD 42.3 37.0 - 54.0 fl    MPV 9.9 6.0 - 12.0 fL    Platelets 254 150 - 450 10*3/mm3    Neutrophil % 68.1 41.0 - 71.0 %    Lymphocyte % 17.5 (L) 24.0 - 44.0 %    Monocyte % 9.5 0.0 - 12.0 %    Eosinophil % 3.8 (H) 0.0 - 3.0 %    Basophil % 0.2 0.0 - 1.0 %    Immature Grans % 0.9 (H) 0.0 - 0.6 %    Neutrophils, Absolute 6.27 1.50 - 8.30 10*3/mm3    Lymphocytes, Absolute 1.61 0.60 - 4.80 10*3/mm3    Monocytes, Absolute 0.87 0.00 - 1.00 10*3/mm3    Eosinophils, Absolute 0.35 (H) 0.10 - 0.30 10*3/mm3    Basophils, Absolute 0.02 0.00 - 0.20 10*3/mm3    Immature Grans, Absolute 0.08 (H) 0.00 - 0.03 10*3/mm3   Urinalysis, Microscopic Only    Collection Time: 02/14/17 11:08 AM   Result Value Ref Range    RBC, UA 0-2 None Seen, 0-2 /HPF    WBC, UA 0-2 (A) None Seen /HPF    Bacteria, UA None Seen None Seen, Trace /HPF    Squamous Epithelial Cells, UA None Seen None Seen, 0-2 /HPF    Hyaline Casts, UA None Seen 0 - 6 /LPF    Methodology Automated Microscopy      Note: In addition to lab results from this visit, the labs listed above may include labs taken at another facility or during a different encounter within the last 24 hours. Please correlate lab times with ED  admission and discharge times for further clarification of the services performed during this visit.    US Gallbladder   Preliminary Result   1.  Mild thickening of the gallbladder wall.   2.  Some echogenic debris within the gallbladder without evidence of   shadowing stones.   3.  Shadowing echogenic focus in the proximal common bile duct; possible   small stones.   4.  Dense hepatic parenchyma.       D:  02/14/2017   E:  02/14/2017                   CT Abdomen Pelvis Without Contrast   Preliminary Result   1. Bilateral nonobstructing nephroliths.   2. Small punctate speck of calcification and/or stone in the distal   portion of the right ureter; nonobstructing.   3. 9 mm stone lodged in the gallbladder neck.       D:  02/14/2017   E:  02/14/2017            Vitals:    02/14/17 1501 02/14/17 1630 02/14/17 1848 02/14/17 2200   BP:  142/82 140/96 136/80   BP Location:    Left arm   Patient Position:    Lying   Pulse: 56 66  67   Resp:    20   Temp:    98.1 °F (36.7 °C)   TempSrc:    Oral   SpO2: 95% 98%  95%   Weight:       Height:         Medications   sodium chloride 0.9 % flush 10 mL (not administered)   sodium chloride 0.9 % flush 1-10 mL (not administered)   heparin (porcine) 5000 UNIT/ML injection 5,000 Units (5,000 Units Subcutaneous Given 2/14/17 2142)   ketorolac (TORADOL) injection 30 mg (30 mg Intravenous Given 2/14/17 2153)   ondansetron (ZOFRAN) tablet 4 mg (not administered)     Or   ondansetron (ZOFRAN) injection 4 mg (not administered)   docusate sodium (COLACE) capsule 100 mg (100 mg Oral Given 2/14/17 1849)   sennosides-docusate sodium (SENOKOT-S) 8.6-50 MG tablet 2 tablet (2 tablets Oral Given 2/14/17 1849)   piperacillin-tazobactam (ZOSYN) 4.5 g/100 mL 0.9% NS IVPB (mbp) (4.5 g Intravenous New Bag 2/14/17 2142)   lisinopril (PRINIVIL,ZESTRIL) tablet 20 mg (20 mg Oral Given 2/14/17 1850)     Or   hydrochlorothiazide (HYDRODIURIL) tablet 12.5 mg ( Oral Not Given:  See Alt 2/14/17 7175)   sodium  chloride 0.9 % bolus 1,000 mL (0 mL Intravenous Stopped 2/14/17 1330)   HYDROmorphone (DILAUDID) injection 0.5 mg (0.5 mg Intravenous Given 2/14/17 1153)   promethazine (PHENERGAN) injection 12.5 mg (12.5 mg Intravenous Given 2/14/17 1154)   ketorolac (TORADOL) injection 30 mg (30 mg Intravenous Given 2/14/17 1255)   piperacillin-tazobactam (ZOSYN) 4.5 g in dextrose 100 mL IVPB (premix) (0 g Intravenous Stopped 2/14/17 1639)     ECG/EMG Results (last 24 hours)     ** No results found for the last 24 hours. **              MDM  Number of Diagnoses or Management Options  Cholecystitis: new and requires workup  Right upper quadrant abdominal pain: new and requires workup  Right ureteral stone: new and requires workup     Amount and/or Complexity of Data Reviewed  Clinical lab tests: ordered and reviewed  Tests in the radiology section of CPT®:  ordered and reviewed  Discuss the patient with other providers: yes  Independent visualization of images, tracings, or specimens: yes    Patient Progress  Patient progress: improved      Final diagnoses:   Right ureteral stone   Cholecystitis   Right upper quadrant abdominal pain       Documentation assistance provided by scrmike Reddy.  Information recorded by the scribe was done at my direction and has been verified and validated by me.    Balbir Reddy  02/14/17 1134      Balbir Reddy  02/14/17 1634      Meng Taylor MD  02/14/17 1847       Electronically signed by Meng Taylor MD at 2/14/2017 10:45 PM        Vital Signs (last 72 hrs)       02/12 0700  -  02/13 0659 02/13 0700  -  02/14 0659 02/14 0700  -  02/15 0659 02/15 0700  -  02/15 1627   Most Recent    Temp (°F)     97.6 -  98.1    97.4 -  98     97.9 (36.6)    Heart Rate     (!)45 -  79    66 -  87     73    Resp     18 -  20    16 -  20     20    BP     116/76 -  178/86    110/62 -  143/85     121/59    SpO2 (%)     94 -  100    93 -  97     97          Hospital Medications (active)       Dose  "Frequency Start End    docusate sodium (COLACE) capsule 100 mg (MAR Hold) ((MAR Hold) since 2/15/2017  2:04 PM) 100 mg 2 Times Daily 2/14/2017     Sig - Route: Take 1 capsule by mouth 2 (Two) Times a Day. - Oral    famotidine (PEPCID) tablet 20 mg 20 mg Once 2/15/2017 2/15/2017    Sig - Route: Take 1 tablet by mouth 1 (One) Time. - Oral    FentaNYL Citrate (PF) (SUBLIMAZE) injection 50 mcg 50 mcg Every 5 Minutes PRN 2/15/2017     Sig - Route: Infuse 1 mL into a venous catheter Every 5 (Five) Minutes As Needed for moderate pain (4-6). - Intravenous    heparin (porcine) 5000 UNIT/ML injection 5,000 Units (MAR Hold) ((MAR Hold) since 2/15/2017  2:04 PM) 5,000 Units Every 8 Hours Scheduled 2/14/2017     Sig - Route: Inject 1 mL under the skin Every 8 (Eight) Hours. - Subcutaneous    hydrochlorothiazide (HYDRODIURIL) tablet 12.5 mg (MAR Hold) ((MAR Hold) since 2/15/2017  2:04 PM) 12.5 mg Nightly 2/15/2017     Sig - Route: Take 1 tablet by mouth Every Night. - Oral    Linked Group 1:  \"And\" Linked Group Details        HYDROmorphone (DILAUDID) injection 0.5 mg 0.5 mg Every 5 Minutes PRN 2/15/2017     Sig - Route: Infuse 0.5 mg into a venous catheter Every 5 (Five) Minutes As Needed for severe pain (7-10). - Intravenous    ketorolac (TORADOL) injection 30 mg 30 mg Every 6 Hours PRN 2/14/2017 2/15/2017    Sig - Route: Infuse 30 mg into a venous catheter Every 6 (Six) Hours As Needed for moderate pain (4-6). - Intravenous    lactated ringers bolus 500 mL 500 mL Once As Needed 2/15/2017     Sig - Route: Infuse 500 mL into a venous catheter 1 (One) Time As Needed (for hypovolemia, call anesthesiologist before initiating). - Intravenous    lactated ringers infusion 9 mL/hr Continuous 2/15/2017     Sig - Route: Infuse 9 mL/hr into a venous catheter Continuous. - Intravenous    lisinopril (PRINIVIL,ZESTRIL) tablet 20 mg (MAR Hold) ((MAR Hold) since 2/15/2017  2:04 PM) 20 mg Nightly 2/15/2017     Sig - Route: Take 1 tablet by mouth " "Every Night. - Oral    Linked Group 1:  \"And\" Linked Group Details        ondansetron (ZOFRAN) injection 4 mg (MAR Hold) ((MAR Hold) since 2/15/2017  2:04 PM) 4 mg Every 6 Hours PRN 2/14/2017     Sig - Route: Infuse 2 mL into a venous catheter Every 6 (Six) Hours As Needed for nausea or vomiting. - Intravenous    Linked Group 2:  \"Or\" Linked Group Details        ondansetron (ZOFRAN) injection 4 mg 4 mg Once As Needed 2/15/2017     Sig - Route: Infuse 2 mL into a venous catheter 1 (One) Time As Needed for nausea or vomiting. - Intravenous    ondansetron (ZOFRAN) tablet 4 mg (MAR Hold) ((MAR Hold) since 2/15/2017  2:04 PM) 4 mg Every 6 Hours PRN 2/14/2017     Sig - Route: Take 1 tablet by mouth Every 6 (Six) Hours As Needed for nausea or vomiting. - Oral    Linked Group 2:  \"Or\" Linked Group Details        piperacillin-tazobactam (ZOSYN) 4.5 g in dextrose 100 mL IVPB (premix) 4.5 g Once 2/14/2017 2/14/2017    Sig - Route: Infuse 100 mL into a venous catheter 1 (One) Time. - Intravenous    piperacillin-tazobactam (ZOSYN) 4.5 g/100 mL 0.9% NS IVPB (mbp) 4.5 g Every 6 Hours 2/14/2017     Sig - Route: Infuse 100 mL into a venous catheter Every 6 (Six) Hours. - Intravenous    sennosides-docusate sodium (SENOKOT-S) 8.6-50 MG tablet 2 tablet (MAR Hold) ((MAR Hold) since 2/15/2017  2:04 PM) 2 tablet 2 Times Daily 2/14/2017     Sig - Route: Take 2 tablets by mouth 2 (Two) Times a Day. - Oral    sodium chloride 0.9 % flush 1-10 mL (MAR Hold) ((MAR Hold) since 2/15/2017  2:04 PM) 1-10 mL As Needed 2/14/2017     Sig - Route: Infuse 1-10 mL into a venous catheter As Needed for line care. - Intravenous    sodium chloride 0.9 % flush 10 mL (MAR Hold) ((MAR Hold) since 2/15/2017  2:04 PM) 10 mL As Needed 2/14/2017     Sig - Route: Infuse 10 mL into a venous catheter As Needed for line care. - Intravenous    Cosign for Ordering: Accepted by Meng Taylor MD on 2/14/2017  2:48 PM    sodium chloride 0.9 % infusion 125 mL/hr Continuous " "2/15/2017     Sig - Route: Infuse 125 mL/hr into a venous catheter Continuous. - Intravenous    atracurium injection (Discontinued)  As Needed 2/15/2017 2/15/2017    Sig - Route: Infuse  into a venous catheter As Needed. - Intravenous    Reason for Discontinue: Anesthesia Stop    bupivacaine-EPINEPHrine (MARCAINE w/EPI) injection (Discontinued)  As Needed 2/15/2017 2/15/2017    Sig: As Needed.    Reason for Discontinue: Patient Discharge    famotidine (PEPCID) injection 20 mg (Discontinued) 20 mg Once 2/15/2017 2/15/2017    Sig - Route: Infuse 2 mL into a venous catheter 1 (One) Time. - Intravenous    Reason for Discontinue: Patient Transfer    FentaNYL Citrate (PF) (SUBLIMAZE) injection (Discontinued)  As Needed 2/15/2017 2/15/2017    Sig - Route: Infuse  into a venous catheter As Needed for severe pain (7-10). - Intravenous    Reason for Discontinue: Anesthesia Stop    glycopyrrolate (ROBINUL) injection (Discontinued)  As Needed 2/15/2017 2/15/2017    Sig - Route: Infuse  into a venous catheter As Needed for excess secretions. - Intravenous    Reason for Discontinue: Anesthesia Stop    hydrochlorothiazide (HYDRODIURIL) tablet 12.5 mg (Discontinued) 12.5 mg Every 24 Hours Scheduled 2/14/2017 2/15/2017    Sig - Route: Take 1 tablet by mouth Daily. - Oral    Reason for Discontinue: Formulary change    Linked Group 3:  \"Or\" Linked Group Details        hydrochlorothiazide (HYDRODIURIL) tablet 12.5 mg (Discontinued) 12.5 mg Daily 2/15/2017 2/15/2017    Sig - Route: Take 1 tablet by mouth Daily. - Oral    Linked Group 1:  \"And\" Linked Group Details        lidocaine PF (XYLOCAINE) 1 % injection 1 mL (Discontinued) 1 mL Once 2/15/2017 2/15/2017    Sig - Route: 1 mL by Infiltration route 1 (One) Time. - Infiltration    Reason for Discontinue: Patient Transfer    lidocaine PF (XYLOCAINE) 1 % injection (Discontinued)  As Needed 2/15/2017 2/15/2017    Sig: As Needed.    Reason for Discontinue: Anesthesia Stop    lisinopril " "(PRINIVIL,ZESTRIL) tablet 20 mg (Discontinued) 20 mg Every 24 Hours Scheduled 2/14/2017 2/15/2017    Sig - Route: Take 1 tablet by mouth Daily. - Oral    Reason for Discontinue: Formulary change    Linked Group 3:  \"Or\" Linked Group Details        lisinopril (PRINIVIL,ZESTRIL) tablet 20 mg (Discontinued) 20 mg Daily 2/15/2017 2/15/2017    Sig - Route: Take 1 tablet by mouth Daily. - Oral    Linked Group 1:  \"And\" Linked Group Details        lisinopril-hydrochlorothiazide (PRINZIDE,ZESTORETIC) 20-12.5 MG per tablet 1 tablet (Discontinued) 1 tablet Daily 2/14/2017 2/14/2017    Sig - Route: Take 1 tablet by mouth Daily. - Oral    lisinopril-hydrochlorothiazide (PRINZIDE,ZESTORETIC) 20-12.5 MG per tablet 1 tablet (Discontinued) 1 tablet Every 24 Hours Scheduled 2/15/2017 2/15/2017    Sig - Route: Take 1 tablet by mouth Daily. - Oral    Reason for Discontinue: Availability    neostigmine injection (Discontinued)  As Needed 2/15/2017 2/15/2017    Sig - Route: Infuse  into a venous catheter As Needed. - Intravenous    Reason for Discontinue: Anesthesia Stop    Propofol (DIPRIVAN) injection (Discontinued)  As Needed 2/15/2017 2/15/2017    Sig - Route: Infuse  into a venous catheter As Needed. - Intravenous    Reason for Discontinue: Anesthesia Stop    sodium chloride 0.9 % flush 1-10 mL (Discontinued) 1-10 mL As Needed 2/15/2017 2/15/2017    Sig - Route: Infuse 1-10 mL into a venous catheter As Needed for line care. - Intravenous    Reason for Discontinue: Patient Transfer    sodium chloride 0.9 % solution (Discontinued)  As Needed 2/15/2017 2/15/2017    Sig: As Needed.    Reason for Discontinue: Patient Discharge          Lab Results (last 72 hours)     Procedure Component Value Units Date/Time    Urinalysis With / Culture If Indicated [[id number]]  (Abnormal) Collected:  02/14/17 1108    Specimen:  Urine from Urine, Clean Catch Updated:  02/14/17 1125     Color, UA Yellow      Appearance, UA Clear      pH, UA 6.0      " Specific Gravity, UA 1.025      Glucose, UA Negative      Ketones, UA Negative      Bilirubin, UA Negative      Blood, UA Trace (A)      Protein, UA Negative      Leuk Esterase, UA Negative      Nitrite, UA Negative      Urobilinogen, UA 0.2 E.U./dL     Urinalysis, Microscopic Only [[id number]]  (Abnormal) Collected:  02/14/17 1108    Specimen:  Urine from Urine, Clean Catch Updated:  02/14/17 1125     RBC, UA 0-2 /HPF      WBC, UA 0-2 (A) /HPF      Bacteria, UA None Seen /HPF      Squamous Epithelial Cells, UA None Seen /HPF      Hyaline Casts, UA None Seen /LPF      Methodology Automated Microscopy     CBC & Differential [[id number]] Collected:  02/14/17 1108    Specimen:  Blood Updated:  02/14/17 1132    Narrative:       The following orders were created for panel order CBC & Differential.  Procedure                               Abnormality         Status                     ---------                               -----------         ------                     CBC Auto Differential[[id number]]         Abnormal            Final result                 Please view results for these tests on the individual orders.    CBC Auto Differential [[id number]]  (Abnormal) Collected:  02/14/17 1108    Specimen:  Blood Updated:  02/14/17 1132     WBC 9.20 10*3/mm3      RBC 5.07 10*6/mm3      Hemoglobin 16.2 g/dL      Hematocrit 46.9 %      MCV 92.5 fL      MCH 32.0 (H) pg      MCHC 34.5 g/dL      RDW 12.5 %      RDW-SD 42.3 fl      MPV 9.9 fL      Platelets 254 10*3/mm3      Neutrophil % 68.1 %      Lymphocyte % 17.5 (L) %      Monocyte % 9.5 %      Eosinophil % 3.8 (H) %      Basophil % 0.2 %      Immature Grans % 0.9 (H) %      Neutrophils, Absolute 6.27 10*3/mm3      Lymphocytes, Absolute 1.61 10*3/mm3      Monocytes, Absolute 0.87 10*3/mm3      Eosinophils, Absolute 0.35 (H) 10*3/mm3      Basophils, Absolute 0.02 10*3/mm3      Immature Grans, Absolute 0.08 (H) 10*3/mm3     Comprehensive Metabolic Panel [[id number]]  (Abnormal)  Collected:  02/14/17 1108    Specimen:  Blood Updated:  02/14/17 1148     Glucose 126 (H) mg/dL      BUN 13 mg/dL      Creatinine 0.90 mg/dL      Sodium 136 mmol/L      Potassium 4.0 mmol/L      Chloride 102 mmol/L      CO2 29.0 mmol/L      Calcium 10.3 mg/dL      Total Protein 7.2 g/dL      Albumin 4.50 g/dL      ALT (SGPT) 32 U/L      AST (SGOT) 29 U/L      Alkaline Phosphatase 63 U/L      Total Bilirubin 0.8 mg/dL      eGFR Non African Amer 86 mL/min/1.73      Globulin 2.7 gm/dL      A/G Ratio 1.7 g/dL      BUN/Creatinine Ratio 14.4      Anion Gap 5.0 mmol/L     Narrative:       [employer] Guidelines    Stage                           Description                             GFR                      1                               Normal or High                          90+  2                               Mild decrease                            60-89  3                               Moderate decrease                   30-59  4                               Severe decrease                       15-29  5                               Kidney failure                             <15    Lipase [[id number]]  (Normal) Collected:  02/14/17 1108    Specimen:  Blood Updated:  02/14/17 1148     Lipase 33 U/L     [place] Draw [[id number]] Collected:  02/14/17 1108    Specimen:  Blood Updated:  02/14/17 1601    Narrative:       The following orders were created for panel order [place] Draw.  Procedure                               Abnormality         Status                     ---------                               -----------         ------                     Light Blue Top[[id number]]                                    Final result               Green Top (Gel)[[id number]]                                   Final result               Lavender Top[[id number]]                                      Final result               Gold Top - SST[[id number]]                                    Final result               Green Top  (No Gel)[[id number]]                                                             Please view results for these tests on the individual orders.    Light Blue Top [[id number]] Collected:  02/14/17 1108    Specimen:  Blood Updated:  02/14/17 1601     Extra Tube hold for add-on       Auto resulted       Green Top (Gel) [[id number]] Collected:  02/14/17 1108    Specimen:  Blood Updated:  02/14/17 1601     Extra Tube Hold for add-ons.       Auto resulted.       Lavender Top [[id number]] Collected:  02/14/17 1108    Specimen:  Blood Updated:  02/14/17 1601     Extra Tube hold for add-on       Auto resulted       Gold Top - SST [[id number]] Collected:  02/14/17 1108    Specimen:  Blood Updated:  02/14/17 1601     Extra Tube Hold for add-ons.       Auto resulted.       Comprehensive Metabolic Panel [[id number]]  (Abnormal) Collected:  02/15/17 0439    Specimen:  Blood Updated:  02/15/17 0531     Glucose 96 mg/dL      BUN 14 mg/dL      Creatinine 1.00 mg/dL      Sodium 136 mmol/L      Potassium 4.0 mmol/L      Chloride 105 mmol/L      CO2 29.0 mmol/L      Calcium 9.4 mg/dL      Total Protein 6.0 g/dL      Albumin 3.60 g/dL      ALT (SGPT) 24 U/L      AST (SGOT) 20 U/L      Alkaline Phosphatase 52 U/L      Total Bilirubin 1.1 mg/dL      eGFR Non African Amer 76 mL/min/1.73      Globulin 2.4 gm/dL      A/G Ratio 1.5 g/dL      BUN/Creatinine Ratio 14.0      Anion Gap 2.0 (L) mmol/L     Narrative:       National Kid[employer] Guidelines    Stage                           Description                             GFR                      1                               Normal or High                          90+  2                               Mild decrease                            60-89  3                               Moderate decrease                   30-59  4                               Severe decrease                       15-29  5                               Kidney failure                             <15          Imaging  Results (last 72 hours)     Procedure Component Value Units Date/Time    CT Abdomen Pelvis Without Contrast [[id number]] Collected:  02/14/17 1610     Updated:  02/15/17 1048    Narrative:       EXAMINATION: CT ABDOMEN PELVIS WO CONTRAST- 02/14/2017     INDICATION: Right flank pain radiating to right lower quadrant. History  of kidney stones.     TECHNIQUE: Multislice helical CT with two-dimensional reformatted  images.     The radiation dose reduction device was turned on for each scan per the  ALARA (As Low as Reasonably Achievable) protocol.     COMPARISON: NONE     FINDINGS:   Mild atelectasis is present in the lower lobes. The heart size is  normal. There is transmural thickening in the distal third of the  esophagus.     The hepatic parenchyma is free of masses. The gallbladder contains a  stone lodged in the neck measuring 9 mm in diameter. The spleen,  pancreas, and adrenal glands are normal. The renal contours are smooth.  Nonobstructing nephroliths are present bilaterally. There is no  significant hydronephrosis. The course and caliber of the ureters are  normal. A punctate density is present in the distal aspect of the right  ureter which does not appear to be obstructing. There are no  hyperdensities in the bladder. The right colon contains stool. The  appendix is normal. There is no free pelvic fluid. The prostate gland is  normal. The sigmoid colon contains a few diverticula. There is no  significant inguinal adenopathy.     The osseous structures of the abdomen and pelvis are normal.       Impression:       1. Bilateral nonobstructing nephroliths.  2. Small punctate speck of calcification and/or stone in the distal  portion of the right ureter; nonobstructing.  3. 9 mm stone lodged in the gallbladder neck.     D:  02/14/2017  E:  02/14/2017     This report was finalized on 2/15/2017 10:46 AM by Dr. Ferdinand Barrera MD.       US Gallbladder [[id number]] Collected:  02/14/17 1612     Updated:  02/15/17 1048     Narrative:       EXAMINATION: US GALLBLADDER-02/14/2017:     INDICATION: R/O cholecystitis; N20.1-Calculus of ureter; K80.20-Calculus  of gallbladder without cholecystitis without obstruction, abdominal pain  radiating into the back x 1 day.     TECHNIQUE: Multiple images through the right upper quadrant were  obtained with a 4 MHz probe.     COMPARISON: NONE.     FINDINGS:  The visible portions of the pancreatic head are normal. The  pancreatic body and tail are not visible.     The hepatic parenchyma is of increased density. There are no hepatic  masses in the liver.     The gallbladder lumen is free of echogenic foci. The gallbladder wall  measures 3.9 mm in thickness. There is some thickening along the wall of  the gallbladder which does not demonstrate significant posterior  shadowing.     The common bile duct measures 3.7 mm in diameter. There is suggestion of  some calcification within the common bile duct.     The right kidney measures 11.5 x 5.5 x 5.6 cm. There is no renal mass or  hydronephrosis.       Impression:       1.  Mild thickening of the gallbladder wall.  2.  Some echogenic debris within the gallbladder without evidence of  shadowing stones.  3.  Shadowing echogenic focus in the proximal common bile duct; possible  small stones.  4.  Dense hepatic parenchyma.     D:  02/14/2017  E:  02/14/2017            This report was finalized on 2/15/2017 10:46 AM by Dr. Ferdinand Barrera MD.             ECG/EMG Results (last 72 hours)     Procedure Component Value Units Date/Time    ECG 12 Lead [[id number]] Collected:  02/14/17 1236     Updated:  02/14/17 2057    Narrative:       Test Reason : bradycardia  Blood Pressure : **/** mmHG  Vent. Rate : 056 BPM     Atrial Rate : 046 BPM     P-R Int : 148 ms          QRS Dur : 094 ms      QT Int : 446 ms       P-R-T Axes : 049 -05 015 degrees     QTc Int : 430 ms    Marked sinus bradycardia with frequent premature ventricular complexes  Abnormal ECG  No previous ECGs  available  Confirmed by ERNESTINA TY MD ([id number]) on 2/14/2017 8:57:25 PM    Referred By:  MELONY           Confirmed By:ERNESTINA TY MD          Orders (last 72 hrs)     Start     Ordered    02/15/17 2100  [MAR Hold]  lisinopril (PRINIVIL,ZESTRIL) tablet 20 mg  Nightly     (MAR Hold since 02/15/17 1404)    02/15/17 1030    02/15/17 2100  [MAR Hold]  hydrochlorothiazide (HYDRODIURIL) tablet 12.5 mg  Nightly     (MAR Hold since 02/15/17 1404)    02/15/17 1030    02/15/17 1630  sodium chloride 0.9 % infusion  Continuous      02/15/17 1558    02/15/17 1625  Inpatient Admission  Once      02/15/17 1624    02/15/17 1601  Vital signs every 5 minutes for 15 minutes, every 15 minutes thereafter.  Once      02/15/17 1600    02/15/17 1601  Continue OR fluids  Until Discontinued      02/15/17 1600    02/15/17 1601  Call Anesthesiologist for additional IV Fluid bolus for Hypotension/Tachycardia  Until Discontinued      02/15/17 1600    02/15/17 1601  Notify Anesthesiologist with any acute changes in patient's condition.  Continuous      02/15/17 1600    02/15/17 1601  Notify Anesthesiologist for unrelieved pain.  Continuous     Comments:  Notify Anesthesiologist    02/15/17 1600    02/15/17 1601  Contact Anesthesiologist for any additional orders.  Until Discontinued      02/15/17 1600    02/15/17 1601  Oxygen Therapy- Blow by - Humidified; Titrate for spO2: equal to or greater than, 96%, per policy  Continuous      02/15/17 1600    02/15/17 1601  Pulse Oximetry, Continuous  Continuous      02/15/17 1600    02/15/17 1601  Once DC criteria to floor met, follow surgeon's orders.  Until Discontinued      02/15/17 1600    02/15/17 1601  Discharge patient from PACU when discharge criteria is met.  Until Discontinued      02/15/17 1600    02/15/17 1600  lactated ringers bolus 500 mL  Once As Needed      02/15/17 1600    02/15/17 1600  HYDROmorphone (DILAUDID) injection 0.5 mg  Every 5 Minutes PRN      02/15/17 1600    02/15/17 1600   FentaNYL Citrate (PF) (SUBLIMAZE) injection 50 mcg  Every 5 Minutes PRN      02/15/17 1600    02/15/17 1600  ondansetron (ZOFRAN) injection 4 mg  Once As Needed      02/15/17 1600    02/15/17 1510  Tissue Exam      02/15/17 1510    02/15/17 1451  bupivacaine-EPINEPHrine (MARCAINE w/EPI) injection  As Needed,   Status:  Discontinued      02/15/17 1451    02/15/17 1450  sodium chloride 0.9 % solution  As Needed,   Status:  Discontinued      02/15/17 1451    02/15/17 1445  lactated ringers infusion  Continuous      02/15/17 1410    02/15/17 1445  lidocaine PF (XYLOCAINE) 1 % injection 1 mL  Once,   Status:  Discontinued      02/15/17 1410    02/15/17 1445  famotidine (PEPCID) tablet 20 mg  Once      02/15/17 1410    02/15/17 1445  famotidine (PEPCID) injection 20 mg  Once,   Status:  Discontinued      02/15/17 1410    02/15/17 1411  Vital signs per Anesthesia protocol.  Once,   Status:  Canceled      02/15/17 1410    02/15/17 1411  Oxygen Therapy- Nasal Cannula; Titrate for spO2: equal to or greater than, 90%  Continuous      02/15/17 1410    02/15/17 1411  Pulse Oximetry, Continuous  Continuous,   Status:  Canceled      02/15/17 1410    02/15/17 1411  CBC (No Diff)  Once     Comments:  Within 2 weeks if surgery possibly involving significant blood loss of if anemia noted    02/15/17 1410    02/15/17 1411  Potassium  Once     Comments:  For all dialysis patients or within 3 days if taking digoxin, anti-hypertensives, diuretics, or diet pills    02/15/17 1410    02/15/17 1411  Protime-INR  Once     Comments:  On chart - taking warfarin or questionable bleeding problem (repeat if abnormal)    02/15/17 1410    02/15/17 1411  aPTT  Once     Comments:  On chart-taking Heparin or questionable bleeding problem (repeat if abnormal)    02/15/17 1410    02/15/17 1411  POC Glucose Fingerstick  Once,   Status:  Canceled     Comments:  For all diabetic patients and all patients who are to be admitted. Notify Anesthesiologist for  blood sugar > 180.    02/15/17 1410    02/15/17 1411  Insert Peripheral IV  Once,   Status:  Canceled      02/15/17 1410    02/15/17 1411  Saline Lock & Maintain IV Access  Continuous      02/15/17 1410    02/15/17 1411  May take Beta Blocker from home with sip of water.  Once,   Status:  Canceled     Comments:  If patient currently taking Beta Blocker and has not taken within 24 hours    02/15/17 1410    02/15/17 1411  ECG 12 Lead  Once     Comments:  If no ECG within the previous 6 months or any of the following: Heart/Valvular Disease; Previous Abnormal EKG; Diabetic; Renal Failure; Chest Pain; Hypertension; Emphysema/Respiratory Disease - Read by Anesthesiologist    02/15/17 1410    02/15/17 1410  sodium chloride 0.9 % flush 1-10 mL  As Needed,   Status:  Discontinued      02/15/17 1410    02/15/17 1357  Obtain Informed Consent  Once      02/15/17 1357    02/15/17 1030  lisinopril (PRINIVIL,ZESTRIL) tablet 20 mg  Daily,   Status:  Discontinued      02/15/17 0954    02/15/17 1030  hydrochlorothiazide (HYDRODIURIL) tablet 12.5 mg  Daily,   Status:  Discontinued      02/15/17 0954    02/15/17 1000  lisinopril-hydrochlorothiazide (PRINZIDE,ZESTORETIC) 20-12.5 MG per tablet 1 tablet  Every 24 Hours Scheduled,   Status:  Discontinued      02/15/17 0926    02/15/17 0600  Basic Metabolic Panel  Morning Draw,   Status:  Canceled      02/14/17 1753    02/15/17 0600  Magnesium  Morning Draw,   Status:  Canceled      02/14/17 1753    02/15/17 0600  Comprehensive Metabolic Panel  Morning Draw      02/14/17 1832    02/15/17 0001  NPO Diet  Diet Effective Midnight      02/14/17 1753 02/14/17 2200  [MAR Hold]  heparin (porcine) 5000 UNIT/ML injection 5,000 Units  Every 8 Hours Scheduled     (MAR Hold since 02/15/17 1404)    02/14/17 1753    02/14/17 2200  piperacillin-tazobactam (ZOSYN) 4.5 g/100 mL 0.9% NS IVPB (mbp)  Every 6 Hours      02/14/17 1753    02/14/17 2000  Vital Signs  Every 4 Hours      02/14/17 1753     02/14/17 1845  lisinopril (PRINIVIL,ZESTRIL) tablet 20 mg  Every 24 Hours Scheduled,   Status:  Discontinued      02/14/17 1812 02/14/17 1845  hydrochlorothiazide (HYDRODIURIL) tablet 12.5 mg  Every 24 Hours Scheduled,   Status:  Discontinued      02/14/17 1812 02/14/17 1830  lisinopril-hydrochlorothiazide (PRINZIDE,ZESTORETIC) 20-12.5 MG per tablet 1 tablet  Daily,   Status:  Discontinued      02/14/17 1753 02/14/17 1830  [MAR Hold]  docusate sodium (COLACE) capsule 100 mg  2 Times Daily     (MAR Hold since 02/15/17 1404)    02/14/17 1753    02/14/17 1830  [MAR Hold]  sennosides-docusate sodium (SENOKOT-S) 8.6-50 MG tablet 2 tablet  2 Times Daily     (MAR Hold since 02/15/17 1404)    02/14/17 1753    02/14/17 1825  Strain All Urine  Once      02/14/17 1824    02/14/17 1805  DIET MESSAGE Please send dinner tray asap  Once     Comments:  Please send dinner tray asap    02/14/17 1804    02/14/17 1800  Strict Intake and Output  Every Hour      02/14/17 1753    02/14/17 1754  Remove & Replace Compression Stockings (TEDS) Daily  Daily      02/14/17 1753    02/14/17 1752  [MAR Hold]  ondansetron (ZOFRAN) tablet 4 mg  Every 6 Hours PRN     (MAR Hold since 02/15/17 1404)    02/14/17 1753    02/14/17 1752  [MAR Hold]  ondansetron (ZOFRAN) injection 4 mg  Every 6 Hours PRN     (MAR Hold since 02/15/17 1404)    02/14/17 1753    02/14/17 1752  ketorolac (TORADOL) injection 30 mg  Every 6 Hours PRN      02/14/17 1753    02/14/17 1751  Inpatient Consult to General Surgery  Once     Specialty:  General Surgery  Provider:  Porter Desai MD    02/14/17 1753    02/14/17 1750  Diet Regular; Thin; Cardiac  Diet Effective Now,   Status:  Canceled      02/14/17 1753    02/14/17 1749  Full Code  Continuous      02/14/17 1753    02/14/17 1749  VTE Risk Assessment - Moderate Risk  Once      02/14/17 1753    02/14/17 1749  Place Compression Stockings (TEDs)  Once      02/14/17 1753    02/14/17 1748  Weigh patient  Once       02/14/17 1753    02/14/17 1748  Oxygen Therapy-  Continuous      02/14/17 1753    02/14/17 1748  Insert Peripheral IV  Once      02/14/17 1753    02/14/17 1748  Saline Lock & Maintain IV Access  Continuous,   Status:  Canceled      02/14/17 1753    02/14/17 1748  Initiate Observation Status  Once      02/14/17 1753    02/14/17 1747  [MAR Hold]  sodium chloride 0.9 % flush 1-10 mL  As Needed     (MAR Hold since 02/15/17 1404)    02/14/17 1753    02/14/17 1635  Med / Surg Bed Request  Once      02/14/17 1634    02/14/17 1535  piperacillin-tazobactam (ZOSYN) 4.5 g in dextrose 100 mL IVPB (premix)  Once      02/14/17 1533    02/14/17 1249  ketorolac (TORADOL) injection 30 mg  Once      02/14/17 1247    02/14/17 1244  US Gallbladder  1 Time Imaging      02/14/17 1243    02/14/17 1203  ECG 12 Lead  Once      02/14/17 1202    02/14/17 1138  Gio report  Once      02/14/17 1137    02/14/17 1137  sodium chloride 0.9 % bolus 1,000 mL  Once      02/14/17 1135    02/14/17 1137  HYDROmorphone (DILAUDID) injection 0.5 mg  Once      02/14/17 1135    02/14/17 1137  promethazine (PHENERGAN) injection 12.5 mg  Once      02/14/17 1135    02/14/17 1135  CT Abdomen Pelvis Without Contrast  1 Time Imaging     Comments:  NON-CONTRASTED STUDY      02/14/17 1135    02/14/17 1123  Urinalysis, Microscopic Only  Once      02/14/17 1122    02/14/17 1055  NPO Diet  Diet Effective Now,   Status:  Canceled      02/14/17 1054    02/14/17 1055  Undress and Gown  Once      02/14/17 1054    02/14/17 1055  Insert peripheral IV  Once      02/14/17 1054    02/14/17 1055  [place] Draw  Once      02/14/17 1054    02/14/17 1055  CBC & Differential  Once      02/14/17 1054    02/14/17 1055  Comprehensive Metabolic Panel  Once      02/14/17 1054    02/14/17 1055  Lipase  Once      02/14/17 1054    02/14/17 1055  Urinalysis With / Culture If Indicated  Once      02/14/17 1054    02/14/17 1055  Light Blue Top  PROCEDURE ONCE      02/14/17 1054    02/14/17  1055  Green Top (Gel)  PROCEDURE ONCE      02/14/17 1054    02/14/17 1055  Lavender Top  PROCEDURE ONCE      02/14/17 1054    02/14/17 1055  Gold Top - SST  PROCEDURE ONCE      02/14/17 1054    02/14/17 1055  Green Top (No Gel)  PROCEDURE ONCE,   Status:  Canceled      02/14/17 1054    02/14/17 1055  CBC Auto Differential  PROCEDURE ONCE      02/14/17 1054    02/14/17 1054  [MAR Hold]  sodium chloride 0.9 % flush 10 mL  As Needed     (MAR Hold since 02/15/17 1404)    02/14/17 1054    --  lisinopril-hydrochlorothiazide (PRINZIDE,ZESTORETIC) 20-12.5 MG per tablet  Daily      02/14/17 1053    Signed and Held  Advance diet as tolerated  Until Discontinued      Signed and Held    Signed and Held  Full Code  Continuous      Signed and Held    Signed and Held  VTE Risk Assessment - Low Risk  Once      Signed and Held    Signed and Held  Place Compression Stockings (TEDs)  Once      Signed and Held    Signed and Held  Remove & Replace Compression Stockings (TEDS) Daily  Daily      Signed and Held    Signed and Held  Place Sequential Compression Device  Once      Signed and Held    Signed and Held  Maintain Sequential Compression Device  Continuous      Signed and Held    Signed and Held  Up in Chair  As Needed      Signed and Held    Signed and Held  Ambulate Patient  Every Shift      Signed and Held    Signed and Held  Elevate HOB 30 Degrees  Continuous      Signed and Held    Signed and Held  Diet Regular; Low Fat, GI Soft / [place]  Diet Effective Now      Signed and Held    Signed and Held  sodium chloride 0.9 % infusion  Continuous      Signed and Held    Signed and Held  acetaminophen (TYLENOL) tablet 650 mg  Every 4 Hours PRN      Signed and Held    Signed and Held  HYDROcodone-acetaminophen (NORCO) 5-325 MG per tablet 2 tablet  Every 4 Hours PRN      Signed and Held    Signed and Held  HYDROmorphone (DILAUDID) injection 0.5 mg  Every 2 Hours PRN      Signed and Held    Signed and Held  naloxone (NARCAN) injection 0.1  mg  Every 5 Minutes PRN      Signed and Held    Signed and Held  CBC & Differential  Morning Draw      Signed and Held    Signed and Held  Comprehensive Metabolic Panel  Morning Draw      Signed and Held             Physician Progress Notes (last 72 hours) (Notes from 2/12/2017  4:27 PM through 2/15/2017  4:27 PM)      Madalyn Donnelly MD at 2/15/2017  3:52 PM  Version 1 of 1             [facility] Medicine Services  INPATIENT PROGRESS NOTE    Date of Admission: 2/14/2017  Length of Stay: 0  Primary Care Physician: No Known Provider    Subjective-- HPI/Events overnight/ROS/CC- Hospital follow visit.  Detailed ROS not detailed as performed below      No significant acute events reported. C/O vague, achy RUQ pain, not associated with any food intake. No N/V/F/C. Wife at bedside, frustrated that patient has been NPO and surgeon has not been by to give them any information. Patient feels hungry.    Objective      Temp:  [97.4 °F (36.3 °C)-98.1 °F (36.7 °C)] 97.9 °F (36.6 °C)  Heart Rate:  [65-87] 87  Resp:  [16-20] 20  BP: (110-143)/(62-96) 124/68    Physical Exam:  Physical Exam    General Assessment: No acute cardiopulmonary distress. Well developed and well nourished.    HEENT: NCAT, PERRL, MM    Neck: Supple, no carotid bruit bilat    CVS: RRR, S1S2 normal, no murmurs    Resp: CTAB, no adventitious sound    Abd: Obese, soft, mild tenderness in the RUQ, ND, normal BS, no guarding or peritoneal signs    Ext: No edema, both calves are symmetric and NTTP    Neuro: Nonfocal    Skin: W/D/I. No rash.    Psych: Affect is appropriate          Results Review:    I have reviewed the labs, radiology results and diagnostic studies.      Culture Data:  Radiology Data:   Lab Results (last 24 hours)     Procedure Component Value Units Date/Time    [place] Draw [[id number]] Collected:  02/14/17 1108    Specimen:  Blood Updated:  02/14/17 1601    Narrative:       The following orders were created for panel  order [place] Draw.  Procedure                               Abnormality         Status                     ---------                               -----------         ------                     Light Blue Top[[id number]]                                    Final result               Green Top (Gel)[[id number]]                                   Final result               Lavender Top[[id number]]                                      Final result               Gold Top - SST[[id number]]                                    Final result               Green Top (No Gel)[[id number]]                                                             Please view results for these tests on the individual orders.    Light Blue Top [[id number]] Collected:  02/14/17 1108    Specimen:  Blood Updated:  02/14/17 1601     Extra Tube hold for add-on       Auto resulted       Green Top (Gel) [[id number]] Collected:  02/14/17 1108    Specimen:  Blood Updated:  02/14/17 1601     Extra Tube Hold for add-ons.       Auto resulted.       Lavender Top [[id number]] Collected:  02/14/17 1108    Specimen:  Blood Updated:  02/14/17 1601     Extra Tube hold for add-on       Auto resulted       Gold Top - SST [[id number]] Collected:  02/14/17 1108    Specimen:  Blood Updated:  02/14/17 1601     Extra Tube Hold for add-ons.       Auto resulted.       Comprehensive Metabolic Panel [[id number]]  (Abnormal) Collected:  02/15/17 0439    Specimen:  Blood Updated:  02/15/17 0531     Glucose 96 mg/dL      BUN 14 mg/dL      Creatinine 1.00 mg/dL      Sodium 136 mmol/L      Potassium 4.0 mmol/L      Chloride 105 mmol/L      CO2 29.0 mmol/L      Calcium 9.4 mg/dL      Total Protein 6.0 g/dL      Albumin 3.60 g/dL      ALT (SGPT) 24 U/L      AST (SGOT) 20 U/L      Alkaline Phosphatase 52 U/L      Total Bilirubin 1.1 mg/dL      eGFR Non African Amer 76 mL/min/1.73      Globulin 2.4 gm/dL      A/G Ratio 1.5 g/dL      BUN/Creatinine Ratio 14.0      Anion Gap 2.0 (L) mmol/L     Narrative:        National Kidney Foundation Guidelines    Stage                           Description                             GFR                      1                               Normal or High                          90+  2                               Mild decrease                            60-89  3                               Moderate decrease                   30-59  4                               Severe decrease                       15-29  5                               Kidney failure                             <15          I have reviewed the medications.        Assessment/Plan     Assessment/Problem List  This is a 60 y/o M who presented with RUQ pain, concerning for poss acute cholecystitis.    Principal Problem:    Cholecystitis  Active Problems:    Right ureteral stone    Right upper quadrant abdominal pain    HTN (hypertension)    Gall stones    Plan  - awaiting surgical evaluation, so cont with NPO and IVF, as well as abx.  - Long discussion with wife and patient about diagnostic data and plan of care. Many concerns by pt's wife, which I've addressed face to face, spent at least 30 min with them.      Madalyn Donnelly MD   02/15/17   3:52 PM    Please note that portions of this note may have been completed with a voice recognition program. Efforts were made to edit the dictations, but occasionally words are mistranscribed.         Electronically signed by Madalyn Donnelly MD at 2/15/2017  3:57 PM           Consult Notes (last 72 hours) (Notes from 2/12/2017  4:27 PM through 2/15/2017  4:27 PM)      Ryan Corona MD at 2/15/2017  1:50 PM  Version 1 of 1         Antonino Barrera  [id number]  1955       Patient Care Team:  No Known Provider as PCP - General  No Known Provider as PCP - Family Medicine        General Surgery Consult2/15/17  Consultant Dr Donnelly    Chief complaint right upper quadrant pain    Subjective     Patient is a 61 y.o. male presents with right upper  "quadrant pain. Onset of symptoms was abrupt starting 1 day ago.  Symptoms are associated with mild nausea no vomiting.  Patient had a similar episode about 3-4 months ago.  Workup at that time with gallbladder ultrasound was unremarkable.  He has been on Zofran as needed.  He has had a few episodes of biliary colic since.  Last episode yesterday was relatively severe which prompted evaluation in the emergency room.  Liver function tests were unremarkable and CT scan of the abdomen and pelvis showed a stone almost 9 mm in the neck of the gallbladder.  Gallbladder ultrasound however showed no evidence of stones.  Overall he admits feeling better since admission.  He has a strong family history of gallbladder disease.  No previous abdominal surgeries.  He has noticed an umbilical hernia last week while straining.  No complaints of diarrhea.    Allergy: No Known Allergies    Home Medications:   No current facility-administered medications on file prior to encounter.      No current outpatient prescriptions on file prior to encounter.       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 rate 16, height 69\" (175.3 cm), weight 200 lb (90.7 kg), SpO2 93 %.    Intake/Output Summary (Last 24 hours) at 02/15/17 1350  Last data filed at 02/15/17 0900   Gross per 24 hour   Intake    200 ml   Output   1325 ml   Net  -1125 ml       Physical Exam:      General Appearance:    Alert, cooperative, in no acute distress   Head:    Normocephalic, without obvious abnormality, atraumatic   Eyes:   "          Lids and lashes normal, conjunctivae and sclerae normal, no   icterus, no pallor, corneas clear, PERRLA   Ears:    Ears appear intact with no abnormalities noted   Throat:   No oral lesions, no thrush, oral mucosa moist   Neck:   No adenopathy, supple, trachea midline, no thyromegaly, no   carotid bruit, no JVD   Back:     No kyphosis present, no scoliosis present, no skin lesions,      erythema or scars, no tenderness to percussion or                   palpation,   range of motion normal   Lungs:     Clear to auscultation,respirations regular, even and                  unlabored    Heart:    Regular rhythm and normal rate, normal S1 and S2, no            murmur, no gallop, no rub, no click   Chest Wall:    No abnormalities observed   Abdomen:     Normal bowel sounds, no masses, no organomegaly, soft       Tender, in RUQ.non-distended, no guarding, no rebound                Tenderness. Small umbilical hernia. Tender.   Rectal:     Deferred   Extremities:   Moves all extremities well, no edema, no cyanosis, no             redness   Pulses:   Pulses palpable and equal bilaterally   Skin:   No bleeding, bruising or rash   Lymph nodes:   No palpable adenopathy   Neurologic:   Cranial nerves 2 - 12 grossly intact, sensation intact, DTR       present and equal bilaterally       Results Review:    I reviewed the patient's new clinical results.    Results from last 7 days  Lab Units 02/14/17  1108   WBC 10*3/mm3 9.20   HEMOGLOBIN g/dL 16.2   HEMATOCRIT % 46.9   PLATELETS 10*3/mm3 254       Results from last 7 days  Lab Units 02/15/17  0439   SODIUM mmol/L 136   POTASSIUM mmol/L 4.0   CHLORIDE mmol/L 105   TOTAL CO2 mmol/L 29.0   BUN mg/dL 14   CREATININE mg/dL 1.00   GLUCOSE mg/dL 96   CALCIUM mg/dL 9.4       ASSESSMENT AND PLAN: Symptomatic cholelithiasis with a stone in the neck of the gallbladder visualized on CT scan but not ultrasound.stable  I recommend proceeding with laparoscopic cholecystectomy and  possible cholangiogram and possible open cholecystectomy as well as repair of umbilical hernia.discussed the procedure at length with the patient and his wife.  The risks of anesthesia infection, bleeding, DVT, liver, bile duct injury as well as bowel injury were discussed at length  He understands and  is willing to proceed with the surgery      Principal Problem:    Cholecystitis  Active Problems:    Right ureteral stone    Right upper quadrant abdominal pain    HTN (hypertension)    Gall stones        I discussed the patients findings and my recommendations with patient and family.   Thank you for the consultation.    Ryan Corona MD  02/15/17  1:50 PM       Electronically signed by Ryan Corona MD at 2/15/2017  1:56 PM

## 2017-02-15 NOTE — PROGRESS NOTES
[facility] Medicine Services  INPATIENT PROGRESS NOTE    Date of Admission: 2/14/2017  Length of Stay: 0  Primary Care Physician: No Known Provider    Subjective-- HPI/Events overnight/ROS/CC- Hospital follow visit.  Detailed ROS not detailed as performed below      No significant acute events reported. C/O vague, achy RUQ pain, not associated with any food intake. No N/V/F/C. Wife at bedside, frustrated that patient has been NPO and surgeon has not been by to give them any information. Patient feels hungry.    Objective      Temp:  [97.4 °F (36.3 °C)-98.1 °F (36.7 °C)] 97.9 °F (36.6 °C)  Heart Rate:  [65-87] 87  Resp:  [16-20] 20  BP: (110-143)/(62-96) 124/68    Physical Exam:  Physical Exam    General Assessment: No acute cardiopulmonary distress. Well developed and well nourished.    HEENT: NCAT, PERRL, MM    Neck: Supple, no carotid bruit bilat    CVS: RRR, S1S2 normal, no murmurs    Resp: CTAB, no adventitious sound    Abd: Obese, soft, mild tenderness in the RUQ, ND, normal BS, no guarding or peritoneal signs    Ext: No edema, both calves are symmetric and NTTP    Neuro: Nonfocal    Skin: W/D/I. No rash.    Psych: Affect is appropriate          Results Review:    I have reviewed the labs, radiology results and diagnostic studies.      Culture Data:  Radiology Data:   Lab Results (last 24 hours)     Procedure Component Value Units Date/Time    [place] Draw [[id number]] Collected:  02/14/17 1108    Specimen:  Blood Updated:  02/14/17 1601    Narrative:       The following orders were created for panel order [place] Draw.  Procedure                               Abnormality         Status                     ---------                               -----------         ------                     Light Blue Top[[id number]]                                    Final result               Green Top (Gel)[[id number]]                                   Final result               Lavender Top[[id number]]                                       Final result               Gold Top - SST[[id number]]                                    Final result               Green Top (No Gel)[[id number]]                                                             Please view results for these tests on the individual orders.    Light Blue Top [[id number]] Collected:  02/14/17 1108    Specimen:  Blood Updated:  02/14/17 1601     Extra Tube hold for add-on       Auto resulted       Green Top (Gel) [[id number]] Collected:  02/14/17 1108    Specimen:  Blood Updated:  02/14/17 1601     Extra Tube Hold for add-ons.       Auto resulted.       Lavender Top [[id number]] Collected:  02/14/17 1108    Specimen:  Blood Updated:  02/14/17 1601     Extra Tube hold for add-on       Auto resulted       Gold Top - SST [[id number]] Collected:  02/14/17 1108    Specimen:  Blood Updated:  02/14/17 1601     Extra Tube Hold for add-ons.       Auto resulted.       Comprehensive Metabolic Panel [[id number]]  (Abnormal) Collected:  02/15/17 0439    Specimen:  Blood Updated:  02/15/17 0531     Glucose 96 mg/dL      BUN 14 mg/dL      Creatinine 1.00 mg/dL      Sodium 136 mmol/L      Potassium 4.0 mmol/L      Chloride 105 mmol/L      CO2 29.0 mmol/L      Calcium 9.4 mg/dL      Total Protein 6.0 g/dL      Albumin 3.60 g/dL      ALT (SGPT) 24 U/L      AST (SGOT) 20 U/L      Alkaline Phosphatase 52 U/L      Total Bilirubin 1.1 mg/dL      eGFR Non African Amer 76 mL/min/1.73      Globulin 2.4 gm/dL      A/G Ratio 1.5 g/dL      BUN/Creatinine Ratio 14.0      Anion Gap 2.0 (L) mmol/L     Narrative:       National Kidney Foundation Guidelines    Stage                           Description                             GFR                      1                               Normal or High                          90+  2                               Mild decrease                            60-89  3                               Moderate decrease                   30-59  4                                Severe decrease                       15-29  5                               Kidney failure                             <15          I have reviewed the medications.        Assessment/Plan     Assessment/Problem List  This is a 62 y/o M who presented with RUQ pain, concerning for poss acute cholecystitis.    Principal Problem:    Cholecystitis  Active Problems:    Right ureteral stone    Right upper quadrant abdominal pain    HTN (hypertension)    Gall stones    Plan  - awaiting surgical evaluation, so cont with NPO and IVF, as well as abx.  - Long discussion with wife and patient about diagnostic data and plan of care. Many concerns by pt's wife, which I've addressed face to face, spent at least 30 min with them.      Madalyn Donnelly MD   02/15/17   3:52 PM    Please note that portions of this note may have been completed with a voice recognition program. Efforts were made to edit the dictations, but occasionally words are mistranscribed.

## 2017-02-16 PROBLEM — I95.9 HYPOTENSION: Status: ACTIVE | Noted: 2017-02-16

## 2017-02-16 LAB
ALBUMIN SERPL-MCNC: 3.3 G/DL (ref 3.2–4.8)
ALBUMIN/GLOB SERPL: 1.5 G/DL (ref 1.5–2.5)
ALP SERPL-CCNC: 42 U/L (ref 25–100)
ALT SERPL W P-5'-P-CCNC: 44 U/L (ref 7–40)
ANION GAP SERPL CALCULATED.3IONS-SCNC: 9 MMOL/L (ref 3–11)
AST SERPL-CCNC: 39 U/L (ref 0–33)
BASOPHILS # BLD AUTO: 0.01 10*3/MM3 (ref 0–0.2)
BASOPHILS # BLD AUTO: 0.05 10*3/MM3 (ref 0–0.2)
BASOPHILS NFR BLD AUTO: 0.1 % (ref 0–1)
BASOPHILS NFR BLD AUTO: 0.3 % (ref 0–1)
BILIRUB SERPL-MCNC: 0.9 MG/DL (ref 0.3–1.2)
BUN BLD-MCNC: 20 MG/DL (ref 9–23)
BUN/CREAT SERPL: 10.5 (ref 7–25)
CALCIUM SPEC-SCNC: 8.6 MG/DL (ref 8.7–10.4)
CHLORIDE SERPL-SCNC: 107 MMOL/L (ref 99–109)
CO2 SERPL-SCNC: 22 MMOL/L (ref 20–31)
CREAT BLD-MCNC: 1.9 MG/DL (ref 0.6–1.3)
DEPRECATED RDW RBC AUTO: 43.1 FL (ref 37–54)
DEPRECATED RDW RBC AUTO: 45.2 FL (ref 37–54)
EOSINOPHIL # BLD AUTO: 0.01 10*3/MM3 (ref 0.1–0.3)
EOSINOPHIL # BLD AUTO: 0.01 10*3/MM3 (ref 0.1–0.3)
EOSINOPHIL NFR BLD AUTO: 0.1 % (ref 0–3)
EOSINOPHIL NFR BLD AUTO: 0.1 % (ref 0–3)
ERYTHROCYTE [DISTWIDTH] IN BLOOD BY AUTOMATED COUNT: 12.5 % (ref 11.3–14.5)
ERYTHROCYTE [DISTWIDTH] IN BLOOD BY AUTOMATED COUNT: 12.8 % (ref 11.3–14.5)
GFR SERPL CREATININE-BSD FRML MDRD: 36 ML/MIN/1.73
GLOBULIN UR ELPH-MCNC: 2.2 GM/DL
GLUCOSE BLD-MCNC: 170 MG/DL (ref 70–100)
HCT VFR BLD AUTO: 36.4 % (ref 38.9–50.9)
HCT VFR BLD AUTO: 36.9 % (ref 38.9–50.9)
HGB BLD-MCNC: 11.6 G/DL (ref 13.1–17.5)
HGB BLD-MCNC: 11.9 G/DL (ref 13.1–17.5)
IMM GRANULOCYTES # BLD: 0.11 10*3/MM3 (ref 0–0.03)
IMM GRANULOCYTES # BLD: 0.26 10*3/MM3 (ref 0–0.03)
IMM GRANULOCYTES NFR BLD: 0.8 % (ref 0–0.6)
IMM GRANULOCYTES NFR BLD: 1.7 % (ref 0–0.6)
LYMPHOCYTES # BLD AUTO: 1.11 10*3/MM3 (ref 0.6–4.8)
LYMPHOCYTES # BLD AUTO: 1.27 10*3/MM3 (ref 0.6–4.8)
LYMPHOCYTES NFR BLD AUTO: 8.1 % (ref 24–44)
LYMPHOCYTES NFR BLD AUTO: 8.4 % (ref 24–44)
MCH RBC QN AUTO: 30.4 PG (ref 27–31)
MCH RBC QN AUTO: 30.7 PG (ref 27–31)
MCHC RBC AUTO-ENTMCNC: 31.4 G/DL (ref 32–36)
MCHC RBC AUTO-ENTMCNC: 32.7 G/DL (ref 32–36)
MCV RBC AUTO: 94.1 FL (ref 80–99)
MCV RBC AUTO: 96.9 FL (ref 80–99)
MONOCYTES # BLD AUTO: 1.09 10*3/MM3 (ref 0–1)
MONOCYTES # BLD AUTO: 1.09 10*3/MM3 (ref 0–1)
MONOCYTES NFR BLD AUTO: 7.2 % (ref 0–12)
MONOCYTES NFR BLD AUTO: 7.9 % (ref 0–12)
NEUTROPHILS # BLD AUTO: 11.43 10*3/MM3 (ref 1.5–8.3)
NEUTROPHILS # BLD AUTO: 12.52 10*3/MM3 (ref 1.5–8.3)
NEUTROPHILS NFR BLD AUTO: 82.3 % (ref 41–71)
NEUTROPHILS NFR BLD AUTO: 83 % (ref 41–71)
PLATELET # BLD AUTO: 265 10*3/MM3 (ref 150–450)
PLATELET # BLD AUTO: 289 10*3/MM3 (ref 150–450)
PMV BLD AUTO: 9.5 FL (ref 6–12)
PMV BLD AUTO: 9.6 FL (ref 6–12)
POTASSIUM BLD-SCNC: 4.7 MMOL/L (ref 3.5–5.5)
PROT SERPL-MCNC: 5.5 G/DL (ref 5.7–8.2)
RBC # BLD AUTO: 3.81 10*6/MM3 (ref 4.2–5.76)
RBC # BLD AUTO: 3.87 10*6/MM3 (ref 4.2–5.76)
SODIUM BLD-SCNC: 138 MMOL/L (ref 132–146)
WBC NRBC COR # BLD: 13.76 10*3/MM3 (ref 3.5–10.8)
WBC NRBC COR # BLD: 15.2 10*3/MM3 (ref 3.5–10.8)

## 2017-02-16 PROCEDURE — 94799 UNLISTED PULMONARY SVC/PX: CPT

## 2017-02-16 PROCEDURE — 93010 ELECTROCARDIOGRAM REPORT: CPT | Performed by: INTERNAL MEDICINE

## 2017-02-16 PROCEDURE — 80053 COMPREHEN METABOLIC PANEL: CPT | Performed by: SURGERY

## 2017-02-16 PROCEDURE — 25010000002 PIPERACILLIN-TAZOBACTAM: Performed by: INTERNAL MEDICINE

## 2017-02-16 PROCEDURE — 85025 COMPLETE CBC W/AUTO DIFF WBC: CPT | Performed by: NURSE PRACTITIONER

## 2017-02-16 PROCEDURE — 85025 COMPLETE CBC W/AUTO DIFF WBC: CPT | Performed by: SURGERY

## 2017-02-16 PROCEDURE — 25010000002 HYDROMORPHONE PER 4 MG: Performed by: SURGERY

## 2017-02-16 PROCEDURE — 99232 SBSQ HOSP IP/OBS MODERATE 35: CPT | Performed by: HOSPITALIST

## 2017-02-16 RX ORDER — SIMETHICONE 80 MG
80 TABLET,CHEWABLE ORAL 3 TIMES DAILY PRN
Status: DISCONTINUED | OUTPATIENT
Start: 2017-02-16 | End: 2017-02-21 | Stop reason: HOSPADM

## 2017-02-16 RX ORDER — SUCRALFATE 1 G/1
1 TABLET ORAL 4 TIMES DAILY
COMMUNITY

## 2017-02-16 RX ORDER — LISINOPRIL AND HYDROCHLOROTHIAZIDE 25; 20 MG/1; MG/1
0.5 TABLET ORAL DAILY
COMMUNITY
End: 2017-02-21 | Stop reason: HOSPADM

## 2017-02-16 RX ORDER — OMEPRAZOLE 40 MG/1
40 CAPSULE, DELAYED RELEASE ORAL DAILY
COMMUNITY

## 2017-02-16 RX ORDER — VALACYCLOVIR HYDROCHLORIDE 500 MG/1
1000 TABLET, FILM COATED ORAL DAILY
COMMUNITY

## 2017-02-16 RX ADMIN — SODIUM CHLORIDE 125 ML/HR: 9 INJECTION, SOLUTION INTRAVENOUS at 16:49

## 2017-02-16 RX ADMIN — DOCUSATE SODIUM AND SENNOSIDES 2 TABLET: 8.6; 5 TABLET, FILM COATED ORAL at 08:25

## 2017-02-16 RX ADMIN — DOCUSATE SODIUM AND SENNOSIDES 2 TABLET: 8.6; 5 TABLET, FILM COATED ORAL at 17:29

## 2017-02-16 RX ADMIN — DOCUSATE SODIUM 100 MG: 100 CAPSULE, LIQUID FILLED ORAL at 17:29

## 2017-02-16 RX ADMIN — SODIUM CHLORIDE 500 ML: 9 INJECTION, SOLUTION INTRAVENOUS at 19:34

## 2017-02-16 RX ADMIN — SIMETHICONE CHEW TAB 80 MG 80 MG: 80 TABLET ORAL at 05:10

## 2017-02-16 RX ADMIN — HYDROMORPHONE HYDROCHLORIDE 0.5 MG: 1 INJECTION, SOLUTION INTRAMUSCULAR; INTRAVENOUS; SUBCUTANEOUS at 23:19

## 2017-02-16 RX ADMIN — TAZOBACTAM SODIUM AND PIPERACILLIN SODIUM 4.5 G: .5; 4 INJECTION, POWDER, LYOPHILIZED, FOR SOLUTION INTRAVENOUS at 16:21

## 2017-02-16 RX ADMIN — TAZOBACTAM SODIUM AND PIPERACILLIN SODIUM 4.5 G: .5; 4 INJECTION, POWDER, LYOPHILIZED, FOR SOLUTION INTRAVENOUS at 04:33

## 2017-02-16 RX ADMIN — SODIUM CHLORIDE 250 ML: 9 INJECTION, SOLUTION INTRAVENOUS at 14:39

## 2017-02-16 RX ADMIN — DOCUSATE SODIUM 100 MG: 100 CAPSULE, LIQUID FILLED ORAL at 08:25

## 2017-02-16 RX ADMIN — HYDROCODONE BITARTRATE AND ACETAMINOPHEN 2 TABLET: 5; 325 TABLET ORAL at 14:00

## 2017-02-16 RX ADMIN — HYDROMORPHONE HYDROCHLORIDE 0.5 MG: 1 INJECTION, SOLUTION INTRAMUSCULAR; INTRAVENOUS; SUBCUTANEOUS at 19:44

## 2017-02-16 RX ADMIN — SODIUM CHLORIDE 250 ML: 9 INJECTION, SOLUTION INTRAVENOUS at 16:43

## 2017-02-16 RX ADMIN — TAZOBACTAM SODIUM AND PIPERACILLIN SODIUM 4.5 G: .5; 4 INJECTION, POWDER, LYOPHILIZED, FOR SOLUTION INTRAVENOUS at 23:17

## 2017-02-16 RX ADMIN — HYDROCODONE BITARTRATE AND ACETAMINOPHEN 2 TABLET: 5; 325 TABLET ORAL at 08:31

## 2017-02-16 RX ADMIN — TAZOBACTAM SODIUM AND PIPERACILLIN SODIUM 4.5 G: .5; 4 INJECTION, POWDER, LYOPHILIZED, FOR SOLUTION INTRAVENOUS at 10:03

## 2017-02-16 RX ADMIN — HYDROMORPHONE HYDROCHLORIDE 0.5 MG: 1 INJECTION, SOLUTION INTRAMUSCULAR; INTRAVENOUS; SUBCUTANEOUS at 16:49

## 2017-02-16 RX ADMIN — SODIUM CHLORIDE 125 ML/HR: 9 INJECTION, SOLUTION INTRAVENOUS at 08:33

## 2017-02-16 RX ADMIN — HYDROMORPHONE HYDROCHLORIDE 0.5 MG: 1 INJECTION, SOLUTION INTRAMUSCULAR; INTRAVENOUS; SUBCUTANEOUS at 05:08

## 2017-02-16 NOTE — PROGRESS NOTES
"Antonino Barrera  1955  [id number]    Surgery Progress Note    Date of visit: 2/16/2017    Subjective:events of last night noted.  Sinus tachycardia with some blood oozing from lateral puncture sites.  Most likely secondary to bleeding induced by SQ heparin at 9 pm.  Heart rate better and blood pressure stable with adequate UO overnight  Has been eating with minimal abdominal discomfort    Objective:    Visit Vitals   • BP 97/54   • Pulse 87   • Temp 97.8 °F (36.6 °C) (Oral)   • Resp 19   • Ht 69\" (175.3 cm)   • Wt 200 lb (90.7 kg)   • SpO2 94%   • BMI 29.53 kg/m2       Intake/Output Summary (Last 24 hours) at 02/16/17 1643  Last data filed at 02/16/17 1634   Gross per 24 hour   Intake   1550 ml   Output    800 ml   Net    750 ml       CV: Regular rate and rhythm  L: normal air entry    Abd: distended, soft with BS. Puncture sites in upper abdomen with minimal oozing. No redness. Minimal tenderness      LABS:      Results from last 7 days  Lab Units 02/16/17  0437   WBC 10*3/mm3 15.20*   HEMOGLOBIN g/dL 11.6*   HEMATOCRIT % 36.9*   PLATELETS 10*3/mm3 265       Results from last 7 days  Lab Units 02/16/17  0437   SODIUM mmol/L 138   POTASSIUM mmol/L 4.7   CHLORIDE mmol/L 107   TOTAL CO2 mmol/L 22.0   BUN mg/dL 20   CREATININE mg/dL 1.90*   CALCIUM mg/dL 8.6*   BILIRUBIN mg/dL 0.9   ALK PHOS U/L 42   ALT (SGPT) U/L 44*   AST (SGOT) U/L 39*   GLUCOSE mg/dL 170*       Results from last 7 days  Lab Units 02/16/17  0437   SODIUM mmol/L 138   POTASSIUM mmol/L 4.7   CHLORIDE mmol/L 107   TOTAL CO2 mmol/L 22.0   BUN mg/dL 20   CREATININE mg/dL 1.90*   GLUCOSE mg/dL 170*   CALCIUM mg/dL 8.6*       0  Lab Value Date/Time   LIPASE 33 02/14/2017 1108         Assessment/ Plan: s/p lap. Cholecystectomy and repair of umbilical hernia yesterday.  Post op bleeding from puncture sites with most likely and intraabdominal hematoma  Clinically stable  Continue to monitor closely  CBC and CMP in AM  continue to hold heparin   Monitor " UO.  Dx laparoscopy if no improvement.  Discussed at length with patient and family.    Problem List Items Addressed This Visit     Right ureteral stone - Primary    Right upper quadrant abdominal pain    * (Principal)Cholecystitis      Other Visit Diagnoses     Gallstones        Relevant Orders    Tissue Exam            Ryan Corona MD  2/16/2017  4:43 PM

## 2017-02-16 NOTE — PLAN OF CARE
Problem: Cholecystitis/Cholecystectomy (Adult)  Goal: Signs and Symptoms of Listed Potential Problems Will be Absent or Manageable (Cholecystitis/Cholecystectomy)  Outcome: Ongoing (interventions implemented as appropriate)    02/16/17 0306   Cholecystitis/Cholecystectomy   Problems Assessed (Cholecystitis/Cholecystectomy) pain;nausea and vomiting   Problems Present (Cholecystitis/Cholecystectomy) pain;nausea and vomiting

## 2017-02-16 NOTE — PROGRESS NOTES
[facility] Medicine Services  INPATIENT PROGRESS NOTE    Date of Admission: 2/14/2017  Length of Stay: 1  Primary Care Physician: No Known Provider    Subjective-- HPI/Events overnight/ROS/CC- Hospital follow visit.  Detailed ROS not detailed as performed below      S/p lap reed yesterday. Doing ok, up by sink. BP was low, but pt is asymptomatic. Wife reported that patient got too much BP meds, normally only gets half tab of lisinopril-HCTZ. No chest pain or SOA. Wound at RUQ is dressed and secured with foam tape, but blood dripping out of the side when I was there. Pain adequately controlled. Tolerating diet.    Objective      Temp:  [96.1 °F (35.6 °C)-97.9 °F (36.6 °C)] 97.8 °F (36.6 °C)  Heart Rate:  [] 87  Resp:  [17-19] 19  BP: ()/(51-88) 97/54    Physical Exam:  Physical Exam    General Assessment: No acute cardiopulmonary distress. Well developed and well nourished.    HEENT: NCAT, PERRL, MM    Neck: Supple, no carotid bruit bilat    CVS: RRR, S1S2 normal, no murmurs    Resp: CTAB, no adventitious sound    Abd: Obese, soft, mild incisional tenderness, umbilical dressing C/D/I, but RUQ area dressed and secured with foam tape with blight blood oozing out of the side, ND, normal BS, no guarding or peritoneal signs    Ext: No edema, both calves are symmetric and NTTP    Neuro: Nonfocal    Skin: W/D/I. No rash.    Psych: Affect is appropriate      Results Review:    I have reviewed the labs, radiology results and diagnostic studies.      Results from last 7 days  Lab Units 02/16/17  0437   WBC 10*3/mm3 15.20*   HEMOGLOBIN g/dL 11.6*   PLATELETS 10*3/mm3 265       Results from last 7 days  Lab Units 02/16/17  0437   SODIUM mmol/L 138   POTASSIUM mmol/L 4.7   TOTAL CO2 mmol/L 22.0   CREATININE mg/dL 1.90*   GLUCOSE mg/dL 170*       Culture Data:  Radiology Data:     I have reviewed the medications.        Assessment/Plan     Assessment/Problem List  Principal Problem:     Cholecystitis  Active Problems:    Right ureteral stone    Right upper quadrant abdominal pain    HTN (hypertension)    Gall stones    Hypotension    Plan  - Stable, BP low, but pt is asymptomatic. Will push fluids and hold antihypertensive at this time, will leave PRN med on board.  - RN to notify surgeon (Dr. HARDY) of wound bleeding   - Path pending  - Hopefully home tomorrow    Madalyn Donnelly MD   02/16/17   5:28 PM    Please note that portions of this note may have been completed with a voice recognition program. Efforts were made to edit the dictations, but occasionally words are mistranscribed.

## 2017-02-16 NOTE — PLAN OF CARE
Problem: Patient Care Overview (Adult)  Goal: Adult Individualization and Mutuality  Outcome: Ongoing (interventions implemented as appropriate)    02/16/17 1822   Individualization   Patient Specific Goals Pain<4; No falls or injury   Patient Specific Interventions Assess pain q2h & prn; hourly rounds & call light within reach         Problem: Cholecystitis/Cholecystectomy (Adult)  Goal: Signs and Symptoms of Listed Potential Problems Will be Absent or Manageable (Cholecystitis/Cholecystectomy)  Outcome: Ongoing (interventions implemented as appropriate)    02/16/17 1822   Cholecystitis/Cholecystectomy   Problems Assessed (Cholecystitis/Cholecystectomy) pain;situational response   Problems Present (Cholecystitis/Cholecystectomy) pain;situational response

## 2017-02-16 NOTE — NURSING NOTE
Pt lap sites reassessed at 03:45 and two lap dressings were found to have blood saturated through the dressings after pt had ambulated to chair at bedside. Lap sites were reinforced with gauze 4x4s and pressure tape applied. BP was 98/52 at this time and heart rate was 102. SHALOM Cronin was notified at this time. She did come see the patient and ordered simethicone for gas as patient had bloating of the abdominal. Dr. Corona was also notified of patient condition and ordered to D/C Heparin shots, continue reinforcing and applying pressure dressing and stated he would check labs in the morning. No further orders at this time. Will continue to monitor patient status and assess drainage from lap sites.       Honey Jones RN  2/16/2017  4:11 AM

## 2017-02-17 ENCOUNTER — ANESTHESIA EVENT (OUTPATIENT)
Dept: PERIOP | Facility: HOSPITAL | Age: 62
End: 2017-02-17

## 2017-02-17 ENCOUNTER — ANESTHESIA (OUTPATIENT)
Dept: PERIOP | Facility: HOSPITAL | Age: 62
End: 2017-02-17

## 2017-02-17 PROBLEM — D64.9 ANEMIA: Status: ACTIVE | Noted: 2017-02-17

## 2017-02-17 PROBLEM — T14.8XXA HEMATOMA: Status: ACTIVE | Noted: 2017-02-17

## 2017-02-17 LAB
ALBUMIN SERPL-MCNC: 3.4 G/DL (ref 3.2–4.8)
ALBUMIN/GLOB SERPL: 1.8 G/DL (ref 1.5–2.5)
ALP SERPL-CCNC: 39 U/L (ref 25–100)
ALT SERPL W P-5'-P-CCNC: 36 U/L (ref 7–40)
ANION GAP SERPL CALCULATED.3IONS-SCNC: 6 MMOL/L (ref 3–11)
AST SERPL-CCNC: 26 U/L (ref 0–33)
BILIRUB SERPL-MCNC: 0.6 MG/DL (ref 0.3–1.2)
BUN BLD-MCNC: 21 MG/DL (ref 9–23)
BUN/CREAT SERPL: 14 (ref 7–25)
CALCIUM SPEC-SCNC: 8.4 MG/DL (ref 8.7–10.4)
CHLORIDE SERPL-SCNC: 108 MMOL/L (ref 99–109)
CO2 SERPL-SCNC: 24 MMOL/L (ref 20–31)
CREAT BLD-MCNC: 1.5 MG/DL (ref 0.6–1.3)
CYTO UR: NORMAL
DEPRECATED RDW RBC AUTO: 45.2 FL (ref 37–54)
ERYTHROCYTE [DISTWIDTH] IN BLOOD BY AUTOMATED COUNT: 13.1 % (ref 11.3–14.5)
GFR SERPL CREATININE-BSD FRML MDRD: 47 ML/MIN/1.73
GLOBULIN UR ELPH-MCNC: 1.9 GM/DL
GLUCOSE BLD-MCNC: 96 MG/DL (ref 70–100)
HCT VFR BLD AUTO: 27.4 % (ref 38.9–50.9)
HGB BLD-MCNC: 9 G/DL (ref 13.1–17.5)
LAB AP CASE REPORT: NORMAL
LAB AP CLINICAL INFORMATION: NORMAL
Lab: NORMAL
MCH RBC QN AUTO: 31.1 PG (ref 27–31)
MCHC RBC AUTO-ENTMCNC: 32.8 G/DL (ref 32–36)
MCV RBC AUTO: 94.8 FL (ref 80–99)
PATH REPORT.FINAL DX SPEC: NORMAL
PATH REPORT.GROSS SPEC: NORMAL
PLATELET # BLD AUTO: 199 10*3/MM3 (ref 150–450)
PMV BLD AUTO: 9.4 FL (ref 6–12)
POTASSIUM BLD-SCNC: 3.7 MMOL/L (ref 3.5–5.5)
PROT SERPL-MCNC: 5.3 G/DL (ref 5.7–8.2)
RBC # BLD AUTO: 2.89 10*6/MM3 (ref 4.2–5.76)
SODIUM BLD-SCNC: 138 MMOL/L (ref 132–146)
WBC NRBC COR # BLD: 8.67 10*3/MM3 (ref 3.5–10.8)

## 2017-02-17 PROCEDURE — 25010000002 PIPERACILLIN-TAZOBACTAM: Performed by: INTERNAL MEDICINE

## 2017-02-17 PROCEDURE — 0D9W4ZZ DRAINAGE OF PERITONEUM, PERCUTANEOUS ENDOSCOPIC APPROACH: ICD-10-PCS | Performed by: SURGERY

## 2017-02-17 PROCEDURE — 99232 SBSQ HOSP IP/OBS MODERATE 35: CPT | Performed by: HOSPITALIST

## 2017-02-17 PROCEDURE — 25010000002 NEOSTIGMINE 10 MG/10ML SOLUTION: Performed by: NURSE ANESTHETIST, CERTIFIED REGISTERED

## 2017-02-17 PROCEDURE — 25010000003 CEFAZOLIN IN DEXTROSE 2-4 GM/100ML-% SOLUTION: Performed by: NURSE ANESTHETIST, CERTIFIED REGISTERED

## 2017-02-17 PROCEDURE — 85027 COMPLETE CBC AUTOMATED: CPT | Performed by: SURGERY

## 2017-02-17 PROCEDURE — 94799 UNLISTED PULMONARY SVC/PX: CPT

## 2017-02-17 PROCEDURE — 25010000002 HYDROMORPHONE PER 4 MG: Performed by: SURGERY

## 2017-02-17 PROCEDURE — 25010000002 PHENYLEPHRINE PER 1 ML: Performed by: NURSE ANESTHETIST, CERTIFIED REGISTERED

## 2017-02-17 PROCEDURE — 80053 COMPREHEN METABOLIC PANEL: CPT | Performed by: SURGERY

## 2017-02-17 PROCEDURE — 25010000002 ONDANSETRON PER 1 MG: Performed by: INTERNAL MEDICINE

## 2017-02-17 PROCEDURE — 25010000002 PROPOFOL 10 MG/ML EMULSION: Performed by: NURSE ANESTHETIST, CERTIFIED REGISTERED

## 2017-02-17 PROCEDURE — 25010000002 FENTANYL CITRATE (PF) 100 MCG/2ML SOLUTION: Performed by: NURSE ANESTHETIST, CERTIFIED REGISTERED

## 2017-02-17 RX ORDER — PANTOPRAZOLE SODIUM 40 MG/1
40 TABLET, DELAYED RELEASE ORAL
Status: DISCONTINUED | OUTPATIENT
Start: 2017-02-17 | End: 2017-02-21 | Stop reason: HOSPADM

## 2017-02-17 RX ORDER — LISINOPRIL 10 MG/1
10 TABLET ORAL
Status: DISCONTINUED | OUTPATIENT
Start: 2017-02-17 | End: 2017-02-21 | Stop reason: HOSPADM

## 2017-02-17 RX ORDER — ONDANSETRON 2 MG/ML
4 INJECTION INTRAMUSCULAR; INTRAVENOUS ONCE AS NEEDED
Status: DISCONTINUED | OUTPATIENT
Start: 2017-02-17 | End: 2017-02-17 | Stop reason: HOSPADM

## 2017-02-17 RX ORDER — PROPOFOL 10 MG/ML
VIAL (ML) INTRAVENOUS AS NEEDED
Status: DISCONTINUED | OUTPATIENT
Start: 2017-02-17 | End: 2017-02-17 | Stop reason: SURG

## 2017-02-17 RX ORDER — GLYCOPYRROLATE 0.2 MG/ML
INJECTION INTRAMUSCULAR; INTRAVENOUS AS NEEDED
Status: DISCONTINUED | OUTPATIENT
Start: 2017-02-17 | End: 2017-02-17 | Stop reason: SURG

## 2017-02-17 RX ORDER — LISINOPRIL AND HYDROCHLOROTHIAZIDE 12.5; 1 MG/1; MG/1
1 TABLET ORAL
Status: DISCONTINUED | OUTPATIENT
Start: 2017-02-17 | End: 2017-02-17 | Stop reason: CLARIF

## 2017-02-17 RX ORDER — LIDOCAINE HYDROCHLORIDE 20 MG/ML
INJECTION, SOLUTION INFILTRATION; PERINEURAL AS NEEDED
Status: DISCONTINUED | OUTPATIENT
Start: 2017-02-17 | End: 2017-02-17 | Stop reason: SURG

## 2017-02-17 RX ORDER — HYDROMORPHONE HYDROCHLORIDE 1 MG/ML
0.5 INJECTION, SOLUTION INTRAMUSCULAR; INTRAVENOUS; SUBCUTANEOUS
Status: DISCONTINUED | OUTPATIENT
Start: 2017-02-17 | End: 2017-02-17 | Stop reason: HOSPADM

## 2017-02-17 RX ORDER — BUPIVACAINE HYDROCHLORIDE AND EPINEPHRINE 5; 5 MG/ML; UG/ML
INJECTION, SOLUTION PERINEURAL AS NEEDED
Status: DISCONTINUED | OUTPATIENT
Start: 2017-02-17 | End: 2017-02-17 | Stop reason: HOSPADM

## 2017-02-17 RX ORDER — HYDROCHLOROTHIAZIDE 12.5 MG/1
12.5 TABLET ORAL DAILY
Status: DISCONTINUED | OUTPATIENT
Start: 2017-02-17 | End: 2017-02-21 | Stop reason: HOSPADM

## 2017-02-17 RX ORDER — SODIUM CHLORIDE 9 MG/ML
9 INJECTION, SOLUTION INTRAVENOUS CONTINUOUS
Status: DISCONTINUED | OUTPATIENT
Start: 2017-02-17 | End: 2017-02-17

## 2017-02-17 RX ORDER — FENTANYL CITRATE 50 UG/ML
INJECTION, SOLUTION INTRAMUSCULAR; INTRAVENOUS AS NEEDED
Status: DISCONTINUED | OUTPATIENT
Start: 2017-02-17 | End: 2017-02-17 | Stop reason: SURG

## 2017-02-17 RX ORDER — BISACODYL 10 MG
10 SUPPOSITORY, RECTAL RECTAL 3 TIMES DAILY PRN
Status: DISCONTINUED | OUTPATIENT
Start: 2017-02-17 | End: 2017-02-21 | Stop reason: HOSPADM

## 2017-02-17 RX ORDER — FENTANYL CITRATE 50 UG/ML
50 INJECTION, SOLUTION INTRAMUSCULAR; INTRAVENOUS
Status: DISCONTINUED | OUTPATIENT
Start: 2017-02-17 | End: 2017-02-17 | Stop reason: HOSPADM

## 2017-02-17 RX ORDER — CEFAZOLIN SODIUM 2 G/100ML
INJECTION, SOLUTION INTRAVENOUS AS NEEDED
Status: DISCONTINUED | OUTPATIENT
Start: 2017-02-17 | End: 2017-02-17 | Stop reason: SURG

## 2017-02-17 RX ORDER — FAMOTIDINE 20 MG/1
20 TABLET, FILM COATED ORAL 2 TIMES DAILY
Status: DISCONTINUED | OUTPATIENT
Start: 2017-02-17 | End: 2017-02-17 | Stop reason: HOSPADM

## 2017-02-17 RX ORDER — SODIUM CHLORIDE 9 MG/ML
INJECTION, SOLUTION INTRAVENOUS AS NEEDED
Status: DISCONTINUED | OUTPATIENT
Start: 2017-02-17 | End: 2017-02-17 | Stop reason: HOSPADM

## 2017-02-17 RX ORDER — NEOSTIGMINE METHYLSULFATE 1 MG/ML
INJECTION, SOLUTION INTRAVENOUS AS NEEDED
Status: DISCONTINUED | OUTPATIENT
Start: 2017-02-17 | End: 2017-02-17 | Stop reason: SURG

## 2017-02-17 RX ORDER — SODIUM CHLORIDE 9 MG/ML
50 INJECTION, SOLUTION INTRAVENOUS CONTINUOUS
Status: DISCONTINUED | OUTPATIENT
Start: 2017-02-17 | End: 2017-02-19

## 2017-02-17 RX ORDER — ROCURONIUM BROMIDE 10 MG/ML
INJECTION, SOLUTION INTRAVENOUS AS NEEDED
Status: DISCONTINUED | OUTPATIENT
Start: 2017-02-17 | End: 2017-02-17 | Stop reason: SURG

## 2017-02-17 RX ADMIN — PANTOPRAZOLE SODIUM 40 MG: 40 TABLET, DELAYED RELEASE ORAL at 16:52

## 2017-02-17 RX ADMIN — CEFAZOLIN SODIUM 2 G: 2 INJECTION, SOLUTION INTRAVENOUS at 14:45

## 2017-02-17 RX ADMIN — LIDOCAINE HYDROCHLORIDE 50 MG: 20 INJECTION, SOLUTION INFILTRATION; PERINEURAL at 14:40

## 2017-02-17 RX ADMIN — Medication 10 ML: at 12:38

## 2017-02-17 RX ADMIN — DOCUSATE SODIUM 100 MG: 100 CAPSULE, LIQUID FILLED ORAL at 09:05

## 2017-02-17 RX ADMIN — DOCUSATE SODIUM AND SENNOSIDES 2 TABLET: 8.6; 5 TABLET, FILM COATED ORAL at 17:20

## 2017-02-17 RX ADMIN — PROPOFOL 150 MG: 10 INJECTION, EMULSION INTRAVENOUS at 14:40

## 2017-02-17 RX ADMIN — FAMOTIDINE 20 MG: 20 TABLET ORAL at 12:47

## 2017-02-17 RX ADMIN — BISACODYL 10 MG: 10 SUPPOSITORY RECTAL at 18:50

## 2017-02-17 RX ADMIN — HYDROMORPHONE HYDROCHLORIDE 0.5 MG: 1 INJECTION, SOLUTION INTRAMUSCULAR; INTRAVENOUS; SUBCUTANEOUS at 09:02

## 2017-02-17 RX ADMIN — ONDANSETRON 4 MG: 2 INJECTION INTRAMUSCULAR; INTRAVENOUS at 15:05

## 2017-02-17 RX ADMIN — DOCUSATE SODIUM AND SENNOSIDES 2 TABLET: 8.6; 5 TABLET, FILM COATED ORAL at 09:06

## 2017-02-17 RX ADMIN — DOCUSATE SODIUM 100 MG: 100 CAPSULE, LIQUID FILLED ORAL at 17:20

## 2017-02-17 RX ADMIN — TAZOBACTAM SODIUM AND PIPERACILLIN SODIUM 4.5 G: .5; 4 INJECTION, POWDER, LYOPHILIZED, FOR SOLUTION INTRAVENOUS at 09:01

## 2017-02-17 RX ADMIN — ROCURONIUM BROMIDE 15 MG: 10 INJECTION INTRAVENOUS at 14:45

## 2017-02-17 RX ADMIN — HYDROMORPHONE HYDROCHLORIDE 0.5 MG: 1 INJECTION, SOLUTION INTRAMUSCULAR; INTRAVENOUS; SUBCUTANEOUS at 20:34

## 2017-02-17 RX ADMIN — ROCURONIUM BROMIDE 5 MG: 10 INJECTION INTRAVENOUS at 14:40

## 2017-02-17 RX ADMIN — SODIUM CHLORIDE 125 ML/HR: 9 INJECTION, SOLUTION INTRAVENOUS at 09:01

## 2017-02-17 RX ADMIN — HYDROMORPHONE HYDROCHLORIDE 0.5 MG: 1 INJECTION, SOLUTION INTRAMUSCULAR; INTRAVENOUS; SUBCUTANEOUS at 16:52

## 2017-02-17 RX ADMIN — TAZOBACTAM SODIUM AND PIPERACILLIN SODIUM 4.5 G: .5; 4 INJECTION, POWDER, LYOPHILIZED, FOR SOLUTION INTRAVENOUS at 04:27

## 2017-02-17 RX ADMIN — ROBINUL 0.4 MG: 0.2 INJECTION INTRAMUSCULAR; INTRAVENOUS at 15:05

## 2017-02-17 RX ADMIN — SODIUM CHLORIDE 125 ML/HR: 9 INJECTION, SOLUTION INTRAVENOUS at 01:42

## 2017-02-17 RX ADMIN — SODIUM CHLORIDE 9 ML/HR: 9 INJECTION, SOLUTION INTRAVENOUS at 12:51

## 2017-02-17 RX ADMIN — FENTANYL CITRATE 100 MCG: 50 INJECTION, SOLUTION INTRAMUSCULAR; INTRAVENOUS at 14:40

## 2017-02-17 RX ADMIN — PHENYLEPHRINE HYDROCHLORIDE 100 MCG: 10 INJECTION INTRAVENOUS at 14:50

## 2017-02-17 RX ADMIN — HYDROMORPHONE HYDROCHLORIDE 0.5 MG: 1 INJECTION, SOLUTION INTRAMUSCULAR; INTRAVENOUS; SUBCUTANEOUS at 04:31

## 2017-02-17 RX ADMIN — NEOSTIGMINE METHYLSULFATE 3 MG: 1 INJECTION, SOLUTION INTRAVENOUS at 15:05

## 2017-02-17 NOTE — PLAN OF CARE
Problem: Patient Care Overview (Adult)  Goal: Plan of Care Review  Outcome: Ongoing (interventions implemented as appropriate)  Goal: Adult Individualization and Mutuality  Outcome: Ongoing (interventions implemented as appropriate)  Goal: Discharge Needs Assessment  Outcome: Ongoing (interventions implemented as appropriate)    Problem: Cholecystitis/Cholecystectomy (Adult)  Goal: Signs and Symptoms of Listed Potential Problems Will be Absent or Manageable (Cholecystitis/Cholecystectomy)  Outcome: Ongoing (interventions implemented as appropriate)

## 2017-02-17 NOTE — ANESTHESIA POSTPROCEDURE EVALUATION
Patient: Antonino Barrera    Procedure Summary     Date Anesthesia Start Anesthesia Stop Room / Location    02/17/17 1434 1535  [facility] / [facility]       Procedure Diagnosis Surgeon Provider    LAPRASCOPIC EVACUATION OF ABD HEMATOMA (N/A Abdomen) Hematoma MD Cayden Espinoza MD          Anesthesia Type: general  Last vitals  BP      Temp     Pulse     Resp      SpO2        Post Anesthesia Care and Evaluation    Patient location during evaluation: PACU  Patient participation: complete - patient participated  Level of consciousness: awake and alert  Pain score: 0  Pain management: adequate  Airway patency: patent  Anesthetic complications: No anesthetic complications  PONV Status: none  Cardiovascular status: hemodynamically stable and acceptable  Respiratory status: nonlabored ventilation, acceptable and nasal cannula  Hydration status: acceptable

## 2017-02-17 NOTE — ANESTHESIA PROCEDURE NOTES
Airway  Urgency: elective    Airway not difficult    General Information and Staff    Patient location during procedure: OR    Indications and Patient Condition  Indications for airway management: airway protection    Preoxygenated: yes  MILS not maintained throughout  Mask difficulty assessment: 1 - vent by mask    Final Airway Details  Final airway type: endotracheal airway      Successful airway: ETT  Cuffed: yes   Successful intubation technique: direct laryngoscopy  Endotracheal tube insertion site: oral  Blade: Tamica  Blade size: #3  ETT size: 7.5 mm  Cormack-Lehane Classification: grade I - full view of glottis  Placement verified by: chest auscultation and capnometry   Number of attempts at approach: 1    Additional Comments  Negative epigastric sounds, Breath sound equal bilaterally with symmetric chest rise and fall

## 2017-02-17 NOTE — ANESTHESIA PREPROCEDURE EVALUATION
Anesthesia Evaluation     NPO Status: > 8 hours   Airway   Mallampati: II  TM distance: >3 FB  Neck ROM: full  no difficulty expected  Dental    (+) upper dentures        Pulmonary    (-) asthma, decreased breath sounds, not a smoker  Cardiovascular     ECG reviewed  Rhythm: regular  Rate: normal    (+) murmur,   (-) angina, cardiac stents, CABG      Neuro/Psych  (-) seizures, TIA, CVA  GI/Hepatic/Renal/Endo    (+) obesity,    (-) liver disease, renal disease, diabetes    Musculoskeletal     Abdominal    Substance History      OB/GYN          Other                                    Anesthesia Plan    ASA 2     general   (Ga)  intravenous induction     Plan discussed with CRNA.

## 2017-02-17 NOTE — PAYOR COMM NOTE
"Cynthia Barrera (61 y.o. Male)     UPDATED CLINICALS FOR ADDITIONAL DAYS APPROVAL   RETURN [phone]   THANK YOU       Date of Birth Social Security Number Address Home Phone MRN    1955  [address] [place] [address] [phone] [id number]    [employer] Marital Status          None Single       Admission Date Admission Type Admitting Provider Attending Provider Department, Room/Bed    2/14/17 Emergency Madalyn Dnonelly MD [place], Phonekeo, MD [facility] 2F, S211/1    Discharge Date Discharge Disposition Discharge Destination                      Attending Provider: Madalyn Donnelly MD     Allergies:  No Known Allergies    Isolation:  None   Infection:  None   Code Status:  FULL    Ht:  69\" (175.3 cm)   Wt:  200 lb (90.7 kg)    Admission Cmt:  None   Principal Problem:  Cholecystitis [K81.9]                 Active Insurance as of 2/14/2017     Primary Coverage     Payor Plan Insurance Group Employer/Plan Group    [employer] [place] [employer] [place]ICA[place]      Payor Plan Address Payor Plan Phone Number Effective From Effective To    [address] [address] [phone] 2/14/2017     [place] [address]       Subscriber Name Subscriber Birth Date Member ID       CYNTHIA BARRERA JERICHO 1955 [id number]                 Emergency Contacts      (Rel.) Home Phone Work Phone Mobile Phone    Elaine Johnson (Sister) [phone] -- --    Paul Johnson -- -- [phone]            Vital Signs (last 24 hours)       02/16 0700  -  02/17 0659 02/17 0700  -  02/17 1706   Most Recent    Temp (°F) 97.5 -  98    97.5 -  98.1     97.5 (36.4)    Heart Rate 77 -  119    82 -  101     85    Resp 17 -  19    16 -  18     16    BP 93/56 -  140/66    123/68 -  140/76     129/69    SpO2 (%) 91 -  94    93 -  98     98          Hospital Medications (active)       Dose Frequency Start End    acetaminophen (TYLENOL) tablet 650 mg 650 mg Every 4 Hours PRN 2/15/2017     Sig - Route: Take 2 " "tablets by mouth Every 4 (Four) Hours As Needed for mild pain (1-3). - Oral    bisacodyl (DULCOLAX) suppository 10 mg 10 mg 3 Times Daily PRN 2/17/2017     Sig - Route: Insert 1 suppository into the rectum 3 (Three) Times a Day As Needed for constipation. - Rectal    docusate sodium (COLACE) capsule 100 mg 100 mg 2 Times Daily 2/14/2017     Sig - Route: Take 1 capsule by mouth 2 (Two) Times a Day. - Oral    hydrochlorothiazide (HYDRODIURIL) tablet 12.5 mg 12.5 mg Daily 2/17/2017     Sig - Route: Take 1 tablet by mouth Daily. - Oral    Linked Group 1:  \"And\" Linked Group Details        HYDROcodone-acetaminophen (NORCO) 5-325 MG per tablet 2 tablet 2 tablet Every 4 Hours PRN 2/15/2017 2/25/2017    Sig - Route: Take 2 tablets by mouth Every 4 (Four) Hours As Needed for moderate pain (4-6). - Oral    HYDROmorphone (DILAUDID) injection 0.5 mg 0.5 mg Every 2 Hours PRN 2/15/2017 2/25/2017    Sig - Route: Infuse 0.5 mg into a venous catheter Every 2 (Two) Hours As Needed for severe pain (7-10). - Intravenous    Linked Group 2:  \"And\" Linked Group Details        lisinopril (PRINIVIL,ZESTRIL) tablet 10 mg 10 mg Every 24 Hours Scheduled 2/17/2017     Sig - Route: Take 1 tablet by mouth Daily. - Oral    Linked Group 1:  \"And\" Linked Group Details        naloxone (NARCAN) injection 0.1 mg 0.1 mg Every 5 Minutes PRN 2/15/2017     Sig - Route: Infuse 0.25 mL into a venous catheter Every 5 (Five) Minutes As Needed for respiratory depression. - Intravenous    Linked Group 2:  \"And\" Linked Group Details        ondansetron (ZOFRAN) injection 4 mg 4 mg Every 6 Hours PRN 2/14/2017     Sig - Route: Infuse 2 mL into a venous catheter Every 6 (Six) Hours As Needed for nausea or vomiting. - Intravenous    Linked Group 3:  \"Or\" Linked Group Details        ondansetron (ZOFRAN) tablet 4 mg 4 mg Every 6 Hours PRN 2/14/2017     Sig - Route: Take 1 tablet by mouth Every 6 (Six) Hours As Needed for nausea or vomiting. - Oral    Linked Group 3:  " "\"Or\" Linked Group Details        pantoprazole (PROTONIX) EC tablet 40 mg 40 mg 2 Times Daily Before Meals 2/17/2017     Sig - Route: Take 1 tablet by mouth 2 (Two) Times a Day Before Meals. - Oral    piperacillin-tazobactam (ZOSYN) 3.375 g in dextrose 50 mL IVPB (premix) 3.375 g Every 8 Hours 2/18/2017     Sig - Route: Infuse 50 mL into a venous catheter Every 8 (Eight) Hours. - Intravenous    sennosides-docusate sodium (SENOKOT-S) 8.6-50 MG tablet 2 tablet 2 tablet 2 Times Daily 2/14/2017     Sig - Route: Take 2 tablets by mouth 2 (Two) Times a Day. - Oral    simethicone (MYLICON) chewable tablet 80 mg 80 mg 3 Times Daily PRN 2/16/2017     Sig - Route: Chew 1 tablet 3 (Three) Times a Day As Needed for flatulence. - Oral    sodium chloride 0.9 % bolus 500 mL 500 mL Once 2/16/2017 2/16/2017    Sig - Route: Infuse 500 mL into a venous catheter 1 (One) Time. - Intravenous    sodium chloride 0.9 % flush 1-10 mL 1-10 mL As Needed 2/14/2017     Sig - Route: Infuse 1-10 mL into a venous catheter As Needed for line care. - Intravenous    sodium chloride 0.9 % infusion 100 mL/hr Continuous 2/17/2017     Sig - Route: Infuse 100 mL/hr into a venous catheter Continuous. - Intravenous    bupivacaine-EPINEPHrine (MARCAINE w/EPI) injection (Discontinued)  As Needed 2/17/2017 2/17/2017    Sig: As Needed.    Reason for Discontinue: Patient Discharge    ceFAZolin in dextrose (ANCEF) IVPB solution (Discontinued)  As Needed 2/17/2017 2/17/2017    Sig: As Needed.    Reason for Discontinue: Anesthesia Stop    famotidine (PEPCID) tablet 20 mg (Discontinued) 20 mg 2 Times Daily 2/17/2017 2/17/2017    Sig - Route: Take 1 tablet by mouth 2 (Two) Times a Day. - Oral    Reason for Discontinue: Patient Transfer    Cosign for Ordering: Required by Cayden Moore MD    FentaNYL Citrate (PF) (SUBLIMAZE) injection 50 mcg (Discontinued) 50 mcg Every 5 Minutes PRN 2/17/2017 2/17/2017    Sig - Route: Infuse 1 mL into a venous catheter Every 5 (Five) " "Minutes As Needed for moderate pain (4-6). - Intravenous    Reason for Discontinue: Patient Transfer    FentaNYL Citrate (PF) (SUBLIMAZE) injection (Discontinued)  As Needed 2/17/2017 2/17/2017    Sig - Route: Infuse  into a venous catheter As Needed for severe pain (7-10). - Intravenous    Reason for Discontinue: Anesthesia Stop    glycopyrrolate (ROBINUL) injection (Discontinued)  As Needed 2/17/2017 2/17/2017    Sig - Route: Infuse  into a venous catheter As Needed for excess secretions. - Intravenous    Reason for Discontinue: Anesthesia Stop    hydrochlorothiazide (HYDRODIURIL) tablet 12.5 mg (Discontinued) 12.5 mg Nightly 2/15/2017 2/16/2017    Sig - Route: Take 1 tablet by mouth Every Night. - Oral    Linked Group 4:  \"And\" Linked Group Details        HYDROmorphone (DILAUDID) injection 0.5 mg (Discontinued) 0.5 mg Every 5 Minutes PRN 2/17/2017 2/17/2017    Sig - Route: Infuse 0.5 mg into a venous catheter Every 5 (Five) Minutes As Needed for severe pain (7-10). - Intravenous    Reason for Discontinue: Patient Transfer    lactated ringers bolus 500 mL (Discontinued) 500 mL Once As Needed 2/17/2017 2/17/2017    Sig - Route: Infuse 500 mL into a venous catheter 1 (One) Time As Needed (for hypovolemia, call anesthesiologist before initiating). - Intravenous    Reason for Discontinue: Patient Transfer    lidocaine (XYLOCAINE) 2% injection (Discontinued)  As Needed 2/17/2017 2/17/2017    Sig - Route: Inject  as directed As Needed. - Injection    Reason for Discontinue: Anesthesia Stop    lisinopril (PRINIVIL,ZESTRIL) tablet 20 mg (Discontinued) 20 mg Nightly 2/15/2017 2/16/2017    Sig - Route: Take 1 tablet by mouth Every Night. - Oral    Linked Group 4:  \"And\" Linked Group Details        lisinopril-hydrochlorothiazide (PRINZIDE,ZESTORETIC) 10-12.5 MG per tablet 1 tablet (Discontinued) 1 tablet Every 24 Hours Scheduled 2/17/2017 2/17/2017    Sig - Route: Take 1 tablet by mouth Daily. - Oral    Reason for " Discontinue: Formulary change    neostigmine injection (Discontinued)  As Needed 2/17/2017 2/17/2017    Sig - Route: Infuse  into a venous catheter As Needed. - Intravenous    Reason for Discontinue: Anesthesia Stop    ondansetron (ZOFRAN) injection 4 mg (Discontinued) 4 mg Once As Needed 2/17/2017 2/17/2017    Sig - Route: Infuse 2 mL into a venous catheter 1 (One) Time As Needed for nausea or vomiting. - Intravenous    Reason for Discontinue: Patient Transfer    phenylephrine (TAMICA-SYNEPHRINE) injection (Discontinued)  As Needed 2/17/2017 2/17/2017    Sig - Route: Infuse  into a venous catheter As Needed. - Intravenous    Reason for Discontinue: Anesthesia Stop    piperacillin-tazobactam (ZOSYN) 4.5 g/100 mL 0.9% NS IVPB (mbp) (Discontinued) 4.5 g Every 6 Hours 2/14/2017 2/17/2017    Sig - Route: Infuse 100 mL into a venous catheter Every 6 (Six) Hours. - Intravenous    Propofol (DIPRIVAN) injection (Discontinued)  As Needed 2/17/2017 2/17/2017    Sig - Route: Infuse  into a venous catheter As Needed. - Intravenous    Reason for Discontinue: Anesthesia Stop    rocuronium (ZEMURON) injection (Discontinued)  As Needed 2/17/2017 2/17/2017    Sig - Route: Infuse  into a venous catheter As Needed. - Intravenous    Reason for Discontinue: Anesthesia Stop    sodium chloride 0.9 % infusion (Discontinued) 125 mL/hr Continuous 2/15/2017 2/17/2017    Sig - Route: Infuse 125 mL/hr into a venous catheter Continuous. - Intravenous    sodium chloride 0.9 % infusion (Discontinued) 9 mL/hr Continuous 2/17/2017 2/17/2017    Sig - Route: Infuse 9 mL/hr into a venous catheter Continuous. - Intravenous    Cosign for Ordering: Required by Cayden Moore MD    sodium chloride 0.9 % solution (Discontinued)  As Needed 2/17/2017 2/17/2017    Sig: As Needed.    Reason for Discontinue: Patient Discharge          Lab Results (last 72 hours)     Procedure Component Value Units Date/Time    Comprehensive Metabolic Panel [[id number]]  (Abnormal)  Collected:  02/15/17 0439    Specimen:  Blood Updated:  02/15/17 0531     Glucose 96 mg/dL      BUN 14 mg/dL      Creatinine 1.00 mg/dL      Sodium 136 mmol/L      Potassium 4.0 mmol/L      Chloride 105 mmol/L      CO2 29.0 mmol/L      Calcium 9.4 mg/dL      Total Protein 6.0 g/dL      Albumin 3.60 g/dL      ALT (SGPT) 24 U/L      AST (SGOT) 20 U/L      Alkaline Phosphatase 52 U/L      Total Bilirubin 1.1 mg/dL      eGFR Non African Amer 76 mL/min/1.73      Globulin 2.4 gm/dL      A/G Ratio 1.5 g/dL      BUN/Creatinine Ratio 14.0      Anion Gap 2.0 (L) mmol/L     Narrative:       National Kidney Foundation Guidelines    Stage                           Description                             GFR                      1                               Normal or High                          90+  2                               Mild decrease                            60-89  3                               Moderate decrease                   30-59  4                               Severe decrease                       15-29  5                               Kidney failure                             <15    POC Glucose Fingerstick [[id number]]  (Abnormal) Collected:  02/15/17 2348    Specimen:  Blood Updated:  02/15/17 2350     Glucose 185 (H) mg/dL     Narrative:       Meter: [id number] : [id number] Bruce Abernathy    CBC & Differential [[id number]] Collected:  02/16/17 0145    Specimen:  Blood Updated:  02/16/17 0207    Narrative:       The following orders were created for panel order CBC & Differential.  Procedure                               Abnormality         Status                     ---------                               -----------         ------                     CBC Auto Differential[[id number]]         Abnormal            Final result                 Please view results for these tests on the individual orders.    CBC Auto Differential [[id number]]  (Abnormal) Collected:  02/16/17 0145    Specimen:  Blood  Updated:  02/16/17 0207     WBC 13.76 (H) 10*3/mm3       Result checked        RBC 3.87 (L) 10*6/mm3      Hemoglobin 11.9 (L) g/dL      Hematocrit 36.4 (L) %      MCV 94.1 fL      MCH 30.7 pg      MCHC 32.7 g/dL      RDW 12.5 %      RDW-SD 43.1 fl      MPV 9.5 fL      Platelets 289 10*3/mm3      Neutrophil % 83.0 (H) %      Lymphocyte % 8.1 (L) %      Monocyte % 7.9 %      Eosinophil % 0.1 %      Basophil % 0.1 %      Immature Grans % 0.8 (H) %      Neutrophils, Absolute 11.43 (H) 10*3/mm3      Lymphocytes, Absolute 1.11 10*3/mm3      Monocytes, Absolute 1.09 (H) 10*3/mm3      Eosinophils, Absolute 0.01 (L) 10*3/mm3      Basophils, Absolute 0.01 10*3/mm3      Immature Grans, Absolute 0.11 (H) 10*3/mm3     CBC & Differential [[id number]] Collected:  02/16/17 0437    Specimen:  Blood Updated:  02/16/17 0518    Narrative:       The following orders were created for panel order CBC & Differential.  Procedure                               Abnormality         Status                     ---------                               -----------         ------                     Scan Slide[[id number]]                                                                   CBC Auto Differential[[id number]]         Abnormal            Final result                 Please view results for these tests on the individual orders.    CBC Auto Differential [[id number]]  (Abnormal) Collected:  02/16/17 0437    Specimen:  Blood Updated:  02/16/17 0518     WBC 15.20 (H) 10*3/mm3      RBC 3.81 (L) 10*6/mm3      Hemoglobin 11.6 (L) g/dL      Hematocrit 36.9 (L) %      MCV 96.9 fL      MCH 30.4 pg      MCHC 31.4 (L) g/dL      RDW 12.8 %      RDW-SD 45.2 fl      MPV 9.6 fL      Platelets 265 10*3/mm3      Neutrophil % 82.3 (H) %      Lymphocyte % 8.4 (L) %      Monocyte % 7.2 %      Eosinophil % 0.1 %      Basophil % 0.3 %      Immature Grans % 1.7 (H) %      Neutrophils, Absolute 12.52 (H) 10*3/mm3      Lymphocytes, Absolute 1.27 10*3/mm3      Monocytes,  Absolute 1.09 (H) 10*3/mm3      Eosinophils, Absolute 0.01 (L) 10*3/mm3      Basophils, Absolute 0.05 10*3/mm3      Immature Grans, Absolute 0.26 (H) 10*3/mm3     Comprehensive Metabolic Panel [[id number]]  (Abnormal) Collected:  02/16/17 0437    Specimen:  Blood Updated:  02/16/17 0648     Glucose 170 (H) mg/dL      BUN 20 mg/dL      Creatinine 1.90 (H) mg/dL      Sodium 138 mmol/L      Potassium 4.7 mmol/L      Chloride 107 mmol/L      CO2 22.0 mmol/L      Calcium 8.6 (L) mg/dL      Total Protein 5.5 (L) g/dL      Albumin 3.30 g/dL      ALT (SGPT) 44 (H) U/L      AST (SGOT) 39 (H) U/L      Alkaline Phosphatase 42 U/L      Total Bilirubin 0.9 mg/dL      eGFR Non African Amer 36 (L) mL/min/1.73      Globulin 2.2 gm/dL      A/G Ratio 1.5 g/dL      BUN/Creatinine Ratio 10.5      Anion Gap 9.0 mmol/L     Narrative:       National Kidney Foundation Guidelines    Stage                           Description                             GFR                      1                               Normal or High                          90+  2                               Mild decrease                            60-89  3                               Moderate decrease                   30-59  4                               Severe decrease                       15-29  5                               Kidney failure                             <15    Comprehensive Metabolic Panel [[id number]]  (Abnormal) Collected:  02/17/17 0427    Specimen:  Blood Updated:  02/17/17 0546     Glucose 96 mg/dL      BUN 21 mg/dL      Creatinine 1.50 (H) mg/dL      Sodium 138 mmol/L      Potassium 3.7 mmol/L      Chloride 108 mmol/L      CO2 24.0 mmol/L      Calcium 8.4 (L) mg/dL      Total Protein 5.3 (L) g/dL      Albumin 3.40 g/dL      ALT (SGPT) 36 U/L      AST (SGOT) 26 U/L      Alkaline Phosphatase 39 U/L      Total Bilirubin 0.6 mg/dL      eGFR Non African Amer 47 (L) mL/min/1.73      Globulin 1.9 gm/dL      A/G Ratio 1.8 g/dL       BUN/Creatinine Ratio 14.0      Anion Gap 6.0 mmol/L     Narrative:       National Kidney Foundation Guidelines    Stage                           Description                             GFR                      1                               Normal or High                          90+  2                               Mild decrease                            60-89  3                               Moderate decrease                   30-59  4                               Severe decrease                       15-29  5                               Kidney failure                             <15    CBC (No Diff) [[id number]]  (Abnormal) Collected:  02/17/17 0427    Specimen:  Blood Updated:  02/17/17 0623     WBC 8.67 10*3/mm3      RBC 2.89 (L) 10*6/mm3      Hemoglobin 9.0 (L) g/dL      Hematocrit 27.4 (L) %      MCV 94.8 fL      MCH 31.1 (H) pg      MCHC 32.8 g/dL      RDW 13.1 %      RDW-SD 45.2 fl      MPV 9.4 fL      Platelets 199 10*3/mm3     Tissue Exam [[id number]] Collected:  02/15/17 1509    Specimen:  Tissue from Gallbladder Updated:  02/17/17 1700     Case Report --      Surgical Pathology Report                         Case: [id number]                                  Authorizing Provider:  Ryan Corona MD    Collected:           02/15/2017 03:09 PM          Ordering Location:     [facility]   Received:            02/16/2017 08:42 AM                                 OR                                                                           Pathologist:           Ramos Galicia MD                                                            Specimen:    Gallbladder                                                                                 Clinical Information --      The working history is gallstones.        Final Diagnosis --      GALLBLADDER:  Chronic cholecystitis with cholelithiasis and cholesterolosis.   KELLE/jyoti        Gross Description --      Received in formalin labeled  gallbladder is a 10.0x3.5x3.0 cm intact unopened gallbladder with attached cystic duct. The serosa is gray/purple and dusky with fatty adhesions and prominent vasculature. The lumen contains a moderate amount of dark green viscid bile and a single dark green cholelith measuring 1 cm in greatest dimension. The mucosa is dark green and velvety and the wall thickness averages 0.4 cm. No masses are appreciated. Representative sections to include the cystic duct margin, neck, body and fundus are submitted in one cassette.  HBM/mbc        Microscopic Description --      Sections show mucosal distortion with focal collections of foamy histiocytes.  There is otherwise an increase in chronic inflammatory cells and perimuscular fibrosis.  There is no significant atypia.           Embedded Images --           Imaging Results (last 72 hours)     Procedure Component Value Units Date/Time    CT Abdomen Pelvis Without Contrast [[id number]] Collected:  02/14/17 1610     Updated:  02/15/17 1048    Narrative:       EXAMINATION: CT ABDOMEN PELVIS WO CONTRAST- 02/14/2017     INDICATION: Right flank pain radiating to right lower quadrant. History  of kidney stones.     TECHNIQUE: Multislice helical CT with two-dimensional reformatted  images.     The radiation dose reduction device was turned on for each scan per the  ALARA (As Low as Reasonably Achievable) protocol.     COMPARISON: NONE     FINDINGS:   Mild atelectasis is present in the lower lobes. The heart size is  normal. There is transmural thickening in the distal third of the  esophagus.     The hepatic parenchyma is free of masses. The gallbladder contains a  stone lodged in the neck measuring 9 mm in diameter. The spleen,  pancreas, and adrenal glands are normal. The renal contours are smooth.  Nonobstructing nephroliths are present bilaterally. There is no  significant hydronephrosis. The course and caliber of the ureters are  normal. A punctate density is present in the distal  aspect of the right  ureter which does not appear to be obstructing. There are no  hyperdensities in the bladder. The right colon contains stool. The  appendix is normal. There is no free pelvic fluid. The prostate gland is  normal. The sigmoid colon contains a few diverticula. There is no  significant inguinal adenopathy.     The osseous structures of the abdomen and pelvis are normal.       Impression:       1. Bilateral nonobstructing nephroliths.  2. Small punctate speck of calcification and/or stone in the distal  portion of the right ureter; nonobstructing.  3. 9 mm stone lodged in the gallbladder neck.     D:  02/14/2017  E:  02/14/2017     This report was finalized on 2/15/2017 10:46 AM by Dr. Ferdinand Barrera MD.       US Gallbladder [[id number]] Collected:  02/14/17 1612     Updated:  02/15/17 1048    Narrative:       EXAMINATION: US GALLBLADDER-02/14/2017:     INDICATION: R/O cholecystitis; N20.1-Calculus of ureter; K80.20-Calculus  of gallbladder without cholecystitis without obstruction, abdominal pain  radiating into the back x 1 day.     TECHNIQUE: Multiple images through the right upper quadrant were  obtained with a 4 MHz probe.     COMPARISON: NONE.     FINDINGS:  The visible portions of the pancreatic head are normal. The  pancreatic body and tail are not visible.     The hepatic parenchyma is of increased density. There are no hepatic  masses in the liver.     The gallbladder lumen is free of echogenic foci. The gallbladder wall  measures 3.9 mm in thickness. There is some thickening along the wall of  the gallbladder which does not demonstrate significant posterior  shadowing.     The common bile duct measures 3.7 mm in diameter. There is suggestion of  some calcification within the common bile duct.     The right kidney measures 11.5 x 5.5 x 5.6 cm. There is no renal mass or  hydronephrosis.       Impression:       1.  Mild thickening of the gallbladder wall.  2.  Some echogenic debris within the  gallbladder without evidence of  shadowing stones.  3.  Shadowing echogenic focus in the proximal common bile duct; possible  small stones.  4.  Dense hepatic parenchyma.     D:  02/14/2017  E:  02/14/2017            This report was finalized on 2/15/2017 10:46 AM by Dr. Ferdinand Barrera MD.             ECG/EMG Results (last 24 hours)     Procedure Component Value Units Date/Time    ECG 12 Lead [[id number]] Collected:  02/16/17 0004     Updated:  02/17/17 1612    Narrative:       Test Reason : Tachycardia  Blood Pressure : **/** mmHG  Vent. Rate : 106 BPM     Atrial Rate : 106 BPM     P-R Int : 152 ms          QRS Dur : 072 ms      QT Int : 350 ms       P-R-T Axes : 044 003 029 degrees     QTc Int : 464 ms    Sinus tachycardia  Otherwise normal ECG  When compared with ECG of 14-FEB-2017 12:36,  premature ventricular complexes are no longer present  Vent. rate has increased BY  50 BPM  Confirmed by KAZ NOONAN MD ([id number]) on 2/17/2017 4:12:44 PM    Referred By:             Confirmed By:KAZ NOONAN MD          Orders (last 24 hrs)     Start     Ordered    02/18/17 0600  CBC & Differential  Morning Draw      02/17/17 1313    02/18/17 0600  Basic Metabolic Panel  Morning Draw      02/17/17 1313    02/18/17 0000  piperacillin-tazobactam (ZOSYN) 3.375 g in dextrose 50 mL IVPB (premix)  Every 8 Hours      02/17/17 1640    02/17/17 1800  famotidine (PEPCID) tablet 20 mg  2 Times Daily,   Status:  Discontinued      02/17/17 1247    02/17/17 1730  pantoprazole (PROTONIX) EC tablet 40 mg  2 Times Daily Before Meals      02/17/17 1623    02/17/17 1715  sodium chloride 0.9 % infusion  Continuous      02/17/17 1641    02/17/17 1640  bisacodyl (DULCOLAX) suppository 10 mg  3 Times Daily PRN      02/17/17 1640    02/17/17 1538  Oxygen Therapy- Blow by - Humidified; Titrate for spO2: equal to or greater than, 96%, per policy  Continuous      02/17/17 1537    02/17/17 1538  Pulse Oximetry, Continuous  Continuous      02/17/17 1537     02/17/17 1537  Vital signs every 5 minutes for 15 minutes, every 15 minutes thereafter.  Once,   Status:  Canceled      02/17/17 1536    02/17/17 1537  Continue OR fluids  Until Discontinued,   Status:  Canceled      02/17/17 1536    02/17/17 1537  Call Anesthesiologist for additional IV Fluid bolus for Hypotension/Tachycardia  Until Discontinued,   Status:  Canceled      02/17/17 1536    02/17/17 1537  Notify Anesthesiologist with any acute changes in patient's condition.  Continuous,   Status:  Canceled      02/17/17 1536    02/17/17 1537  Notify Anesthesiologist for unrelieved pain.  Continuous,   Status:  Canceled     Comments:  Notify Anesthesiologist    02/17/17 1536    02/17/17 1537  Contact Anesthesiologist for any additional orders.  Until Discontinued,   Status:  Canceled      02/17/17 1536    02/17/17 1537  Once DC criteria to floor met, follow surgeon's orders.  Until Discontinued,   Status:  Canceled      02/17/17 1536    02/17/17 1537  Discharge patient from PACU when discharge criteria is met.  Until Discontinued,   Status:  Canceled      02/17/17 1536    02/17/17 1536  lactated ringers bolus 500 mL  Once As Needed,   Status:  Discontinued      02/17/17 1536    02/17/17 1536  HYDROmorphone (DILAUDID) injection 0.5 mg  Every 5 Minutes PRN,   Status:  Discontinued      02/17/17 1536    02/17/17 1536  FentaNYL Citrate (PF) (SUBLIMAZE) injection 50 mcg  Every 5 Minutes PRN,   Status:  Discontinued      02/17/17 1536    02/17/17 1536  ondansetron (ZOFRAN) injection 4 mg  Once As Needed,   Status:  Discontinued      02/17/17 1536    02/17/17 1454  bupivacaine-EPINEPHrine (MARCAINE w/EPI) injection  As Needed,   Status:  Discontinued      02/17/17 1454    02/17/17 1454  sodium chloride 0.9 % solution  As Needed,   Status:  Discontinued      02/17/17 1454    02/17/17 1330  sodium chloride 0.9 % infusion  Continuous,   Status:  Discontinued      02/17/17 1250    02/17/17 0915  lisinopril-hydrochlorothiazide  (PRINZIDE,ZESTORETIC) 10-12.5 MG per tablet 1 tablet  Every 24 Hours Scheduled,   Status:  Discontinued      02/17/17 0832    02/17/17 0915  lisinopril (PRINIVIL,ZESTRIL) tablet 10 mg  Every 24 Hours Scheduled      02/17/17 0836    02/17/17 0915  hydrochlorothiazide (HYDRODIURIL) tablet 12.5 mg  Daily      02/17/17 0836    02/17/17 0703  NPO Diet Ice chips  Diet Effective Now,   Status:  Canceled      02/17/17 0702    02/17/17 0703  Obtain Informed Consent  Once,   Status:  Canceled      02/17/17 0702    02/17/17 0600  CBC (No Diff)  Morning Draw      02/16/17 1651    02/17/17 0600  Comprehensive Metabolic Panel  Morning Draw      02/16/17 1651    02/16/17 1900  sodium chloride 0.9 % bolus 500 mL  Once      02/16/17 1829    02/16/17 1715  sodium chloride 0.9 % bolus 250 mL  Once      02/16/17 1643    02/16/17 0414  simethicone (MYLICON) chewable tablet 80 mg  3 Times Daily PRN      02/16/17 0414    02/15/17 2100  lisinopril (PRINIVIL,ZESTRIL) tablet 20 mg  Nightly,   Status:  Discontinued      02/15/17 1030    02/15/17 2100  hydrochlorothiazide (HYDRODIURIL) tablet 12.5 mg  Nightly,   Status:  Discontinued      02/15/17 1030    02/15/17 1733  acetaminophen (TYLENOL) tablet 650 mg  Every 4 Hours PRN      02/15/17 1733    02/15/17 1733  HYDROcodone-acetaminophen (NORCO) 5-325 MG per tablet 2 tablet  Every 4 Hours PRN      02/15/17 1733    02/15/17 1733  HYDROmorphone (DILAUDID) injection 0.5 mg  Every 2 Hours PRN      02/15/17 1733    02/15/17 1733  naloxone (NARCAN) injection 0.1 mg  Every 5 Minutes PRN      02/15/17 1733    02/15/17 1630  sodium chloride 0.9 % infusion  Continuous,   Status:  Discontinued      02/15/17 1558    02/14/17 2200  piperacillin-tazobactam (ZOSYN) 4.5 g/100 mL 0.9% NS IVPB (mbp)  Every 6 Hours,   Status:  Discontinued      02/14/17 1753 02/14/17 1830  docusate sodium (COLACE) capsule 100 mg  2 Times Daily      02/14/17 1753 02/14/17 1830  sennosides-docusate sodium (SENOKOT-S) 8.6-50  MG tablet 2 tablet  2 Times Daily      02/14/17 1753    02/14/17 1752  ondansetron (ZOFRAN) tablet 4 mg  Every 6 Hours PRN      02/14/17 1753    02/14/17 1752  ondansetron (ZOFRAN) injection 4 mg  Every 6 Hours PRN      02/14/17 1753    02/14/17 1747  sodium chloride 0.9 % flush 1-10 mL  As Needed      02/14/17 1753    Unscheduled  Up in Chair  As Needed      02/15/17 1733    --  lisinopril-hydrochlorothiazide (PRINZIDE,ZESTORETIC) 20-12.5 MG per tablet  Daily      02/14/17 1053    --  lisinopril-hydrochlorothiazide (PRINZIDE,ZESTORETIC) 20-25 MG per tablet  Daily      02/16/17 0823    --  omeprazole (priLOSEC) 40 MG capsule  Daily      02/16/17 0823    --  loratadine-pseudoephedrine (CLARITIN-D 24-hour)  MG per 24 hr tablet  Daily PRN      02/16/17 0823    --  valACYclovir (VALTREX) 500 MG tablet  Daily      02/16/17 0823    --  sucralfate (CARAFATE) 1 G tablet  4 Times Daily      02/16/17 0823    --  SCANNED - TELEMETRY        02/16/17 0000    --  SCANNED - TELEMETRY        02/14/17 0000             Operative/Procedure Notes (last 24 hours) (Notes from 2/16/2017  5:06 PM through 2/17/2017  5:06 PM)      Ryan Corona MD at 2/17/2017  3:28 PM  Version 1 of 1         CHOLECYSTECTOMY LAPAROSCOPIC  Procedure Note    Antonino Barrera  2/14/2017 - 2/17/2017    Pre-op Diagnosis:   Intraabdominal hematoma  Post-op Diagnosis:     Post-Op Diagnosis Codes:     * Hematoma [T14.8]    Procedure/CPT® Codes:      Procedure(s):  LAPRASCOPIC EVACUATION OF ABD HEMATOMA    Surgeon(s):  Ryan Corona MD    Anesthesia: General    Staff:   Circulator: Margaret DE LOS SANTOS RN  Scrub Person: Ella Peters  Nursing Assistant: Jaymie Ott CNA; Justin Johnson  Orientee: Patti Salamanca; Patti Baez RN    Estimated Blood Loss: * No values recorded between 2/17/2017  2:32 PM and 2/17/2017  3:24 PM *    Specimens:                * No specimens in log *      Drains:           Findings: very minimal intraabdominal  blood  distended colon    Complications: none      Ryan Corona MD     Date: 2/17/2017  Time: 3:28 PM         Electronically signed by Ryan Corona MD at 2/17/2017  3:29 PM           Physician Progress Notes (last 72 hours) (Notes from 2/14/2017  5:06 PM through 2/17/2017  5:06 PM)      Madalyn Donnelly MD at 2/15/2017  3:52 PM  Version 1 of 1             [facility] Medicine Services  INPATIENT PROGRESS NOTE    Date of Admission: 2/14/2017  Length of Stay: 0  Primary Care Physician: No Known Provider    Subjective-- HPI/Events overnight/ROS/CC- Hospital follow visit.  Detailed ROS not detailed as performed below      No significant acute events reported. C/O vague, achy RUQ pain, not associated with any food intake. No N/V/F/C. Wife at bedside, frustrated that patient has been NPO and surgeon has not been by to give them any information. Patient feels hungry.    Objective      Temp:  [97.4 °F (36.3 °C)-98.1 °F (36.7 °C)] 97.9 °F (36.6 °C)  Heart Rate:  [65-87] 87  Resp:  [16-20] 20  BP: (110-143)/(62-96) 124/68    Physical Exam:  Physical Exam    General Assessment: No acute cardiopulmonary distress. Well developed and well nourished.    HEENT: NCAT, PERRL, MM    Neck: Supple, no carotid bruit bilat    CVS: RRR, S1S2 normal, no murmurs    Resp: CTAB, no adventitious sound    Abd: Obese, soft, mild tenderness in the RUQ, ND, normal BS, no guarding or peritoneal signs    Ext: No edema, both calves are symmetric and NTTP    Neuro: Nonfocal    Skin: W/D/I. No rash.    Psych: Affect is appropriate          Results Review:    I have reviewed the labs, radiology results and diagnostic studies.      Culture Data:  Radiology Data:   Lab Results (last 24 hours)     Procedure Component Value Units Date/Time    [facility] Draw [[id number]] Collected:  02/14/17 1108    Specimen:  Blood Updated:  02/14/17 1601    Narrative:       The following orders were created for panel order [place]  Draw.  Procedure                               Abnormality         Status                     ---------                               -----------         ------                     Light Blue Top[[id number]]                                    Final result               Green Top (Gel)[[id number]]                                   Final result               Lavender Top[[id number]]                                      Final result               Gold Top - SST[[id number]]                                    Final result               Green Top (No Gel)[[id number]]                                                             Please view results for these tests on the individual orders.    Light Blue Top [[id number]] Collected:  02/14/17 1108    Specimen:  Blood Updated:  02/14/17 1601     Extra Tube hold for add-on       Auto resulted       Green Top (Gel) [[id number]] Collected:  02/14/17 1108    Specimen:  Blood Updated:  02/14/17 1601     Extra Tube Hold for add-ons.       Auto resulted.       Lavender Top [[id number]] Collected:  02/14/17 1108    Specimen:  Blood Updated:  02/14/17 1601     Extra Tube hold for add-on       Auto resulted       Gold Top - SST [[id number]] Collected:  02/14/17 1108    Specimen:  Blood Updated:  02/14/17 1601     Extra Tube Hold for add-ons.       Auto resulted.       Comprehensive Metabolic Panel [[id number]]  (Abnormal) Collected:  02/15/17 0439    Specimen:  Blood Updated:  02/15/17 0531     Glucose 96 mg/dL      BUN 14 mg/dL      Creatinine 1.00 mg/dL      Sodium 136 mmol/L      Potassium 4.0 mmol/L      Chloride 105 mmol/L      CO2 29.0 mmol/L      Calcium 9.4 mg/dL      Total Protein 6.0 g/dL      Albumin 3.60 g/dL      ALT (SGPT) 24 U/L      AST (SGOT) 20 U/L      Alkaline Phosphatase 52 U/L      Total Bilirubin 1.1 mg/dL      eGFR Non African Amer 76 mL/min/1.73      Globulin 2.4 gm/dL      A/G Ratio 1.5 g/dL      BUN/Creatinine Ratio 14.0      Anion Gap 2.0 (L) mmol/L     Narrative:       National  Kid[employer] Guidelines    Stage                           Description                             GFR                      1                               Normal or High                          90+  2                               Mild decrease                            60-89  3                               Moderate decrease                   30-59  4                               Severe decrease                       15-29  5                               Kidney failure                             <15          I have reviewed the medications.        Assessment/Plan     Assessment/Problem List  This is a 62 y/o M who presented with RUQ pain, concerning for poss acute cholecystitis.    Principal Problem:    Cholecystitis  Active Problems:    Right ureteral stone    Right upper quadrant abdominal pain    HTN (hypertension)    Gall stones    Plan  - awaiting surgical evaluation, so cont with NPO and IVF, as well as abx.  - Long discussion with wife and patient about diagnostic data and plan of care. Many concerns by pt's wife, which I've addressed face to face, spent at least 30 min with them.      Madalyn Donnelly MD   02/15/17   3:52 PM    Please note that portions of this note may have been completed with a voice recognition program. Efforts were made to edit the dictations, but occasionally words are mistranscribed.         Electronically signed by Madalyn Donnelly MD at 2/15/2017  3:57 PM      Ryan Corona MD at 2/16/2017  4:43 PM  Version 1 of 1         Antonino Barrera  1955  [id number]    Surgery Progress Note    Date of visit: 2/16/2017    Subjective:events of last night noted.  Sinus tachycardia with some blood oozing from lateral puncture sites.  Most likely secondary to bleeding induced by SQ heparin at 9 pm.  Heart rate better and blood pressure stable with adequate UO overnight  Has been eating with minimal abdominal discomfort    Objective:    Visit Vitals   • BP 97/54   •  "Pulse 87   • Temp 97.8 °F (36.6 °C) (Oral)   • Resp 19   • Ht 69\" (175.3 cm)   • Wt 200 lb (90.7 kg)   • SpO2 94%   • BMI 29.53 kg/m2       Intake/Output Summary (Last 24 hours) at 02/16/17 1643  Last data filed at 02/16/17 1634   Gross per 24 hour   Intake   1550 ml   Output    800 ml   Net    750 ml       CV: Regular rate and rhythm  L: normal air entry    Abd: distended, soft with BS. Puncture sites in upper abdomen with minimal oozing. No redness. Minimal tenderness      LABS:      Results from last 7 days  Lab Units 02/16/17  0437   WBC 10*3/mm3 15.20*   HEMOGLOBIN g/dL 11.6*   HEMATOCRIT % 36.9*   PLATELETS 10*3/mm3 265       Results from last 7 days  Lab Units 02/16/17  0437   SODIUM mmol/L 138   POTASSIUM mmol/L 4.7   CHLORIDE mmol/L 107   TOTAL CO2 mmol/L 22.0   BUN mg/dL 20   CREATININE mg/dL 1.90*   CALCIUM mg/dL 8.6*   BILIRUBIN mg/dL 0.9   ALK PHOS U/L 42   ALT (SGPT) U/L 44*   AST (SGOT) U/L 39*   GLUCOSE mg/dL 170*       Results from last 7 days  Lab Units 02/16/17  0437   SODIUM mmol/L 138   POTASSIUM mmol/L 4.7   CHLORIDE mmol/L 107   TOTAL CO2 mmol/L 22.0   BUN mg/dL 20   CREATININE mg/dL 1.90*   GLUCOSE mg/dL 170*   CALCIUM mg/dL 8.6*       0  Lab Value Date/Time   LIPASE 33 02/14/2017 1108         Assessment/ Plan: s/p lap. Cholecystectomy and repair of umbilical hernia yesterday.  Post op bleeding from puncture sites with most likely and intraabdominal hematoma  Clinically stable  Continue to monitor closely  CBC and CMP in AM  continue to hold heparin   Monitor UO.  Dx laparoscopy if no improvement.  Discussed at length with patient and family.    Problem List Items Addressed This Visit     Right ureteral stone - Primary    Right upper quadrant abdominal pain    * (Principal)Cholecystitis      Other Visit Diagnoses     Gallstones        Relevant Orders    Tissue Exam            Ryan Corona MD  2/16/2017  4:43 PM       Electronically signed by Ryan Corona MD at 2/16/2017  4:51 " PM      Madalyn Donnelly MD at 2/16/2017  5:28 PM  Version 1 of 1             [facility] Medicine Services  INPATIENT PROGRESS NOTE    Date of Admission: 2/14/2017  Length of Stay: 1  Primary Care Physician: No Known Provider    Subjective-- HPI/Events overnight/ROS/CC- Hospital follow visit.  Detailed ROS not detailed as performed below      S/p lap reed yesterday. Doing ok, up by sink. BP was low, but pt is asymptomatic. Wife reported that patient got too much BP meds, normally only gets half tab of lisinopril-HCTZ. No chest pain or SOA. Wound at RUQ is dressed and secured with foam tape, but blood dripping out of the side when I was there. Pain adequately controlled. Tolerating diet.    Objective      Temp:  [96.1 °F (35.6 °C)-97.9 °F (36.6 °C)] 97.8 °F (36.6 °C)  Heart Rate:  [] 87  Resp:  [17-19] 19  BP: ()/(51-88) 97/54    Physical Exam:  Physical Exam    General Assessment: No acute cardiopulmonary distress. Well developed and well nourished.    HEENT: NCAT, PERRL, MM    Neck: Supple, no carotid bruit bilat    CVS: RRR, S1S2 normal, no murmurs    Resp: CTAB, no adventitious sound    Abd: Obese, soft, mild incisional tenderness, umbilical dressing C/D/I, but RUQ area dressed and secured with foam tape with blight blood oozing out of the side, ND, normal BS, no guarding or peritoneal signs    Ext: No edema, both calves are symmetric and NTTP    Neuro: Nonfocal    Skin: W/D/I. No rash.    Psych: Affect is appropriate      Results Review:    I have reviewed the labs, radiology results and diagnostic studies.      Results from last 7 days  Lab Units 02/16/17  0437   WBC 10*3/mm3 15.20*   HEMOGLOBIN g/dL 11.6*   PLATELETS 10*3/mm3 265       Results from last 7 days  Lab Units 02/16/17  0437   SODIUM mmol/L 138   POTASSIUM mmol/L 4.7   TOTAL CO2 mmol/L 22.0   CREATININE mg/dL 1.90*   GLUCOSE mg/dL 170*       Culture Data:  Radiology Data:     I have reviewed the  "medications.        Assessment/Plan     Assessment/Problem List  Principal Problem:    Cholecystitis  Active Problems:    Right ureteral stone    Right upper quadrant abdominal pain    HTN (hypertension)    Gall stones    Hypotension    Plan  - Stable, BP low, but pt is asymptomatic. Will push fluids and hold antihypertensive at this time, will leave PRN med on board.  - RN to notify surgeon (Dr. HARDY) of wound bleeding   - Path pending  - Hopefully home tomorrow    Madalyn Donnelly MD   02/16/17   5:28 PM    Please note that portions of this note may have been completed with a voice recognition program. Efforts were made to edit the dictations, but occasionally words are mistranscribed.         Electronically signed by Madalyn Donnelly MD at 2/16/2017  5:34 PM      Ryan Corona MD at 2/17/2017  7:03 AM  Version 1 of 1         Antonino Barrera  1955  [id number]    Surgery Progress Note    Date of visit: 2/17/2017    Subjective:c/o abdominal pressure  Taking PO  Voiding well    Objective:    Visit Vitals   • /66 (BP Location: Left arm, Patient Position: Lying)   • Pulse 97   • Temp 97.6 °F (36.4 °C) (Oral)   • Resp 18   • Ht 69\" (175.3 cm)   • Wt 200 lb (90.7 kg)   • SpO2 93%   • BMI 29.53 kg/m2       Intake/Output Summary (Last 24 hours) at 02/17/17 0703  Last data filed at 02/17/17 0405   Gross per 24 hour   Intake   2700 ml   Output   2650 ml   Net     50 ml       CV: Regular rate and rhythm  L: normal air entry    Abd: distended with mild tenderness. BS present      LABS:      Results from last 7 days  Lab Units 02/17/17  0427   WBC 10*3/mm3 8.67   HEMOGLOBIN g/dL 9.0*   HEMATOCRIT % 27.4*   PLATELETS 10*3/mm3 199       Results from last 7 days  Lab Units 02/17/17  0427   SODIUM mmol/L 138   POTASSIUM mmol/L 3.7   CHLORIDE mmol/L 108   TOTAL CO2 mmol/L 24.0   BUN mg/dL 21   CREATININE mg/dL 1.50*   CALCIUM mg/dL 8.4*   BILIRUBIN mg/dL 0.6   ALK PHOS U/L 39   ALT (SGPT) U/L 36   AST (SGOT) " U/L 26   GLUCOSE mg/dL 96       Results from last 7 days  Lab Units 02/17/17  0427   SODIUM mmol/L 138   POTASSIUM mmol/L 3.7   CHLORIDE mmol/L 108   TOTAL CO2 mmol/L 24.0   BUN mg/dL 21   CREATININE mg/dL 1.50*   GLUCOSE mg/dL 96   CALCIUM mg/dL 8.4*       0  Lab Value Date/Time   LIPASE 33 02/14/2017 1108         Assessment/ Plan: s/p lap cholecystectomy on 2/15/17  Post-op bleeding   Off Heparin  Clinically stable  H/H noted  Recommend  Lap. Evacuation of hematoma to facilitate recovery.    Problem List Items Addressed This Visit     Right ureteral stone - Primary    Right upper quadrant abdominal pain    * (Principal)Cholecystitis      Other Visit Diagnoses     Gallstones        Relevant Orders    Tissue Exam            Ryan Corona MD  2/17/2017  7:03 AM       Electronically signed by Ryan Corona MD at 2/17/2017  7:05 AM      Madalyn Donnelly MD at 2/17/2017  1:04 PM  Version 2 of 2             [facility] Medicine Services  INPATIENT PROGRESS NOTE    Date of Admission: 2/14/2017  Length of Stay: 2  Primary Care Physician: No Known Provider    Subjective-- HPI/Events overnight/ROS/CC- Hospital follow visit.  Detailed ROS not detailed as performed below      Doing relatively well today. Pain is adequately controlled and no further bleeding from RUQ incision, but c/o bloating. Pt is passing flatus. No chest pain or palpitations, no fever or chills. No diarrhea. Hypotension  Has resolved.    Objective      Temp:  [97.5 °F (36.4 °C)-98.1 °F (36.7 °C)] 97.9 °F (36.6 °C)  Heart Rate:  [] 101  Resp:  [18-19] 18  BP: ()/(54-79) 140/76    Physical Exam:  Physical Exam    General Assessment: No acute cardiopulmonary distress. Well developed and well nourished.    HEENT: NCAT, PERRL, MM    Neck: Supple, no carotid bruit bilat    CVS: RRR, S1S2 normal, no murmurs    Resp: CTAB, no adventitious sound    Abd: Obese, soft, incisional tenderness, Distended, normal BS,  no guarding or peritoneal signs, dressings C/D/I    Ext: No edema, both calves are symmetric and NTTP    Neuro: Nonfocal    Skin: W/D/I. No rash.    Psych: Affect is appropriate          Results Review:    I have reviewed the labs, radiology results and diagnostic studies.      Results from last 7 days  Lab Units 02/17/17  0427   WBC 10*3/mm3 8.67   HEMOGLOBIN g/dL 9.0*   PLATELETS 10*3/mm3 199       Results from last 7 days  Lab Units 02/17/17  0427   SODIUM mmol/L 138   POTASSIUM mmol/L 3.7   TOTAL CO2 mmol/L 24.0   CREATININE mg/dL 1.50*   GLUCOSE mg/dL 96       Culture Data:  Radiology Data:     I have reviewed the medications.        Assessment/Plan     Assessment/Problem List  Principal Problem:    Cholecystitis  Active Problems:    Right ureteral stone    Right upper quadrant abdominal pain    HTN (hypertension)    Gall stones    Hypotension    Hematoma/Post Op bleeding      Plan    - Resume home anti-hypertensive meds, hypotension has resolved (got too much Lisinopril-HCTZ)  - For evac of hematoma today.  - check CBC in am, H/H trended down some, likely ABL from surgery + dilutional   - Poss home tomorrow  - Path pending      Madalyn Donnelly MD   02/17/17   1:09 PM    Please note that portions of this note may have been completed with a voice recognition program. Efforts were made to edit the dictations, but occasionally words are mistranscribed.         Electronically signed by Madalyn Donnelly MD at 2/17/2017  1:13 PM      Madalyn Donnelly MD at 2/17/2017  1:04 PM  Version 1 of 2             [facility] Medicine Services  INPATIENT PROGRESS NOTE    Date of Admission: 2/14/2017  Length of Stay: 2  Primary Care Physician: No Known Provider    Subjective-- HPI/Events overnight/ROS/CC- Hospital follow visit.  Detailed ROS not detailed as performed below      Doing relatively well today. Pain is adequately controlled and no further bleeding from RUQ incision, but c/o  bloating. Pt is passing flatus. No chest pain or palpitations, no fever or chills. No diarrhea. Hypotension  Has resolved.    Objective      Temp:  [97.5 °F (36.4 °C)-98.1 °F (36.7 °C)] 97.9 °F (36.6 °C)  Heart Rate:  [] 101  Resp:  [18-19] 18  BP: ()/(54-79) 140/76    Physical Exam:  Physical Exam    General Assessment: No acute cardiopulmonary distress. Well developed and well nourished.    HEENT: NCAT, PERRL, MM    Neck: Supple, no carotid bruit bilat    CVS: RRR, S1S2 normal, no murmurs    Resp: CTAB, no adventitious sound    Abd: Obese, soft, incisional tenderness, Distended, normal BS, no guarding or peritoneal signs, dressings C/D/I    Ext: No edema, both calves are symmetric and NTTP    Neuro: Nonfocal    Skin: W/D/I. No rash.    Psych: Affect is appropriate          Results Review:    I have reviewed the labs, radiology results and diagnostic studies.      Results from last 7 days  Lab Units 02/17/17  0427   WBC 10*3/mm3 8.67   HEMOGLOBIN g/dL 9.0*   PLATELETS 10*3/mm3 199       Results from last 7 days  Lab Units 02/17/17  0427   SODIUM mmol/L 138   POTASSIUM mmol/L 3.7   TOTAL CO2 mmol/L 24.0   CREATININE mg/dL 1.50*   GLUCOSE mg/dL 96       Culture Data:  Radiology Data:     I have reviewed the medications.        Assessment/Plan     Assessment/Problem List  Principal Problem:    Cholecystitis  Active Problems:    Right ureteral stone    Right upper quadrant abdominal pain    HTN (hypertension)    Gall stones    Hypotension    Hematoma/Post Op bleeding      Plan    - Resume home anti-hypertensive meds, hypotension has resolved (got too much Lisinopril-HCTZ)  - For evac of hematoma today.  - Post home tomorrow  - Path pending      Madalyn Donnelly MD   02/17/17   1:09 PM    Please note that portions of this note may have been completed with a voice recognition program. Efforts were made to edit the dictations, but occasionally words are mistranscribed.         Electronically signed by  Madalyn Donnelly MD at 2/17/2017  1:11 PM           Consult Notes (last 72 hours) (Notes from 2/14/2017  5:06 PM through 2/17/2017  5:06 PM)      Ryan Corona MD at 2/15/2017  1:50 PM  Version 1 of 1         Antonino Barrera  [id number]  1955       Patient Care Team:  No Known Provider as PCP - General  No Known Provider as PCP - Family Medicine        General Surgery Consult2/15/17  Consultant Dr Donnelly    Chief complaint right upper quadrant pain    Subjective     Patient is a 61 y.o. male presents with right upper quadrant pain. Onset of symptoms was abrupt starting 1 day ago.  Symptoms are associated with mild nausea no vomiting.  Patient had a similar episode about 3-4 months ago.  Workup at that time with gallbladder ultrasound was unremarkable.  He has been on Zofran as needed.  He has had a few episodes of biliary colic since.  Last episode yesterday was relatively severe which prompted evaluation in the emergency room.  Liver function tests were unremarkable and CT scan of the abdomen and pelvis showed a stone almost 9 mm in the neck of the gallbladder.  Gallbladder ultrasound however showed no evidence of stones.  Overall he admits feeling better since admission.  He has a strong family history of gallbladder disease.  No previous abdominal surgeries.  He has noticed an umbilical hernia last week while straining.  No complaints of diarrhea.    Allergy: No Known Allergies    Home Medications:   No current facility-administered medications on file prior to encounter.      No current outpatient prescriptions on file prior to encounter.       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 Social ETOHin construction    Review of Systems   Pertinent items are noted in  "HPI      Physical Exam:    Objective     Vital Signs  Blood pressure 131/82, pulse 68, temperature 97.9 °F (36.6 °C), temperature source Oral, resp. rate 16, height 69\" (175.3 cm), weight 200 lb (90.7 kg), SpO2 93 %.    Intake/Output Summary (Last 24 hours) at 02/15/17 1350  Last data filed at 02/15/17 0900   Gross per 24 hour   Intake    200 ml   Output   1325 ml   Net  -1125 ml       Physical Exam:      General Appearance:    Alert, cooperative, in no acute distress   Head:    Normocephalic, without obvious abnormality, atraumatic   Eyes:            Lids and lashes normal, conjunctivae and sclerae normal, no   icterus, no pallor, corneas clear, PERRLA   Ears:    Ears appear intact with no abnormalities noted   Throat:   No oral lesions, no thrush, oral mucosa moist   Neck:   No adenopathy, supple, trachea midline, no thyromegaly, no   carotid bruit, no JVD   Back:     No kyphosis present, no scoliosis present, no skin lesions,      erythema or scars, no tenderness to percussion or                   palpation,   range of motion normal   Lungs:     Clear to auscultation,respirations regular, even and                  unlabored    Heart:    Regular rhythm and normal rate, normal S1 and S2, no            murmur, no gallop, no rub, no click   Chest Wall:    No abnormalities observed   Abdomen:     Normal bowel sounds, no masses, no organomegaly, soft       Tender, in RUQ.non-distended, no guarding, no rebound                Tenderness. Small umbilical hernia. Tender.   Rectal:     Deferred   Extremities:   Moves all extremities well, no edema, no cyanosis, no             redness   Pulses:   Pulses palpable and equal bilaterally   Skin:   No bleeding, bruising or rash   Lymph nodes:   No palpable adenopathy   Neurologic:   Cranial nerves 2 - 12 grossly intact, sensation intact, DTR       present and equal bilaterally       Results Review:    I reviewed the patient's new clinical results.    Results from last 7 days  Lab " Units 02/14/17  1108   WBC 10*3/mm3 9.20   HEMOGLOBIN g/dL 16.2   HEMATOCRIT % 46.9   PLATELETS 10*3/mm3 254       Results from last 7 days  Lab Units 02/15/17  0439   SODIUM mmol/L 136   POTASSIUM mmol/L 4.0   CHLORIDE mmol/L 105   TOTAL CO2 mmol/L 29.0   BUN mg/dL 14   CREATININE mg/dL 1.00   GLUCOSE mg/dL 96   CALCIUM mg/dL 9.4       ASSESSMENT AND PLAN: Symptomatic cholelithiasis with a stone in the neck of the gallbladder visualized on CT scan but not ultrasound.stable  I recommend proceeding with laparoscopic cholecystectomy and possible cholangiogram and possible open cholecystectomy as well as repair of umbilical hernia.discussed the procedure at length with the patient and his wife.  The risks of anesthesia infection, bleeding, DVT, liver, bile duct injury as well as bowel injury were discussed at length  He understands and  is willing to proceed with the surgery      Principal Problem:    Cholecystitis  Active Problems:    Right ureteral stone    Right upper quadrant abdominal pain    HTN (hypertension)    Gall stones        I discussed the patients findings and my recommendations with patient and family.   Thank you for the consultation.    Ryan Corona MD  02/15/17  1:50 PM       Electronically signed by Ryan Corona MD at 2/15/2017  1:56 PM

## 2017-02-17 NOTE — PROGRESS NOTES
"Antonino Barrera  1955  [id number]    Surgery Progress Note    Date of visit: 2/17/2017    Subjective:c/o abdominal pressure  Taking PO  Voiding well    Objective:    Visit Vitals   • /66 (BP Location: Left arm, Patient Position: Lying)   • Pulse 97   • Temp 97.6 °F (36.4 °C) (Oral)   • Resp 18   • Ht 69\" (175.3 cm)   • Wt 200 lb (90.7 kg)   • SpO2 93%   • BMI 29.53 kg/m2       Intake/Output Summary (Last 24 hours) at 02/17/17 0703  Last data filed at 02/17/17 0405   Gross per 24 hour   Intake   2700 ml   Output   2650 ml   Net     50 ml       CV: Regular rate and rhythm  L: normal air entry    Abd: distended with mild tenderness. BS present      LABS:      Results from last 7 days  Lab Units 02/17/17  0427   WBC 10*3/mm3 8.67   HEMOGLOBIN g/dL 9.0*   HEMATOCRIT % 27.4*   PLATELETS 10*3/mm3 199       Results from last 7 days  Lab Units 02/17/17  0427   SODIUM mmol/L 138   POTASSIUM mmol/L 3.7   CHLORIDE mmol/L 108   TOTAL CO2 mmol/L 24.0   BUN mg/dL 21   CREATININE mg/dL 1.50*   CALCIUM mg/dL 8.4*   BILIRUBIN mg/dL 0.6   ALK PHOS U/L 39   ALT (SGPT) U/L 36   AST (SGOT) U/L 26   GLUCOSE mg/dL 96       Results from last 7 days  Lab Units 02/17/17  0427   SODIUM mmol/L 138   POTASSIUM mmol/L 3.7   CHLORIDE mmol/L 108   TOTAL CO2 mmol/L 24.0   BUN mg/dL 21   CREATININE mg/dL 1.50*   GLUCOSE mg/dL 96   CALCIUM mg/dL 8.4*       0  Lab Value Date/Time   LIPASE 33 02/14/2017 1108         Assessment/ Plan: s/p lap cholecystectomy on 2/15/17  Post-op bleeding   Off Heparin  Clinically stable  H/H noted  Recommend  Lap. Evacuation of hematoma to facilitate recovery.    Problem List Items Addressed This Visit     Right ureteral stone - Primary    Right upper quadrant abdominal pain    * (Principal)Cholecystitis      Other Visit Diagnoses     Gallstones        Relevant Orders    Tissue Exam            Ryan Corona MD  2/17/2017  7:03 AM    "

## 2017-02-17 NOTE — PLAN OF CARE
Problem: Cholecystitis/Cholecystectomy (Adult)  Goal: Signs and Symptoms of Listed Potential Problems Will be Absent or Manageable (Cholecystitis/Cholecystectomy)  Outcome: Ongoing (interventions implemented as appropriate)    02/17/17 1711   Cholecystitis/Cholecystectomy   Problems Assessed (Cholecystitis/Cholecystectomy) pain   Problems Present (Cholecystitis/Cholecystectomy) pain         Problem: Pressure Ulcer Risk (Maximiliano Scale) (Adult,Obstetrics,Pediatric)  Goal: Identify Related Risk Factors and Signs and Symptoms  Outcome: Ongoing (interventions implemented as appropriate)    02/17/17 1711   Pressure Ulcer Risk (Maximiliano Scale)   Related Risk Factors (Pressure Ulcer Risk (Maximiliano Scale)) medication       Goal: Skin Integrity  Outcome: Ongoing (interventions implemented as appropriate)    02/17/17 1711   Pressure Ulcer Risk (Maximiliano Scale) (Adult,Obstetrics,Pediatric)   Skin Integrity making progress toward outcome         Problem: Pain, Acute (Adult)  Goal: Identify Related Risk Factors and Signs and Symptoms  Outcome: Ongoing (interventions implemented as appropriate)    02/17/17 1711   Pain, Acute   Related Risk Factors (Acute Pain) surgery   Signs and Symptoms (Acute Pain) verbalization of pain descriptors       Goal: Acceptable Pain Control/Comfort Level  Outcome: Ongoing (interventions implemented as appropriate)    02/17/17 1711   Pain, Acute (Adult)   Acceptable Pain Control/Comfort Level making progress toward outcome

## 2017-02-17 NOTE — PROGRESS NOTES
[facility] Medicine Services  INPATIENT PROGRESS NOTE    Date of Admission: 2/14/2017  Length of Stay: 2  Primary Care Physician: No Known Provider    Subjective-- HPI/Events overnight/ROS/CC- Hospital follow visit.  Detailed ROS not detailed as performed below      Doing relatively well today. Pain is adequately controlled and no further bleeding from RUQ incision, but c/o bloating. Pt is passing flatus. No chest pain or palpitations, no fever or chills. No diarrhea. Hypotension  Has resolved.    Objective      Temp:  [97.5 °F (36.4 °C)-98.1 °F (36.7 °C)] 97.9 °F (36.6 °C)  Heart Rate:  [] 101  Resp:  [18-19] 18  BP: ()/(54-79) 140/76    Physical Exam:  Physical Exam    General Assessment: No acute cardiopulmonary distress. Well developed and well nourished.    HEENT: NCAT, PERRL, MM    Neck: Supple, no carotid bruit bilat    CVS: RRR, S1S2 normal, no murmurs    Resp: CTAB, no adventitious sound    Abd: Obese, soft, incisional tenderness, Distended, normal BS, no guarding or peritoneal signs, dressings C/D/I    Ext: No edema, both calves are symmetric and NTTP    Neuro: Nonfocal    Skin: W/D/I. No rash.    Psych: Affect is appropriate          Results Review:    I have reviewed the labs, radiology results and diagnostic studies.      Results from last 7 days  Lab Units 02/17/17  0427   WBC 10*3/mm3 8.67   HEMOGLOBIN g/dL 9.0*   PLATELETS 10*3/mm3 199       Results from last 7 days  Lab Units 02/17/17  0427   SODIUM mmol/L 138   POTASSIUM mmol/L 3.7   TOTAL CO2 mmol/L 24.0   CREATININE mg/dL 1.50*   GLUCOSE mg/dL 96       Culture Data:  Radiology Data:     I have reviewed the medications.        Assessment/Plan     Assessment/Problem List  Principal Problem:    Cholecystitis  Active Problems:    Right ureteral stone    Right upper quadrant abdominal pain    HTN (hypertension)    Gall stones    Hypotension    Hematoma/Post Op bleeding      Plan    - Resume home anti-hypertensive  meds, hypotension has resolved (got too much Lisinopril-HCTZ)  - For evac of hematoma today.  - check CBC in am, H/H trended down some, likely ABL from surgery + dilutional   - Poss home tomorrow  - Path pending      Phonedawit Donnelly MD   02/17/17   1:09 PM    Please note that portions of this note may have been completed with a voice recognition program. Efforts were made to edit the dictations, but occasionally words are mistranscribed.

## 2017-02-17 NOTE — BRIEF OP NOTE
CHOLECYSTECTOMY LAPAROSCOPIC  Procedure Note    Antonino ECHAVARRIA Barrera  2/14/2017 - 2/17/2017    Pre-op Diagnosis:   Intraabdominal hematoma  Post-op Diagnosis:     Post-Op Diagnosis Codes:     * Hematoma [T14.8]    Procedure/CPT® Codes:      Procedure(s):  LAPRASCOPIC EVACUATION OF ABD HEMATOMA    Surgeon(s):  Ryan Corona MD    Anesthesia: General    Staff:   Circulator: Margaret DE LOS SANTOS RN  Scrub Person: Ella Peters  Nursing Assistant: Jaymie Ott CNA; Justin Johnson  Orientee: Patti Salamanca; Patti Baez RN    Estimated Blood Loss: * No values recorded between 2/17/2017  2:32 PM and 2/17/2017  3:24 PM *    Specimens:                * No specimens in log *      Drains:           Findings: very minimal intraabdominal blood  distended colon    Complications: none      Ryan Corona MD     Date: 2/17/2017  Time: 3:28 PM

## 2017-02-18 ENCOUNTER — APPOINTMENT (OUTPATIENT)
Dept: CT IMAGING | Facility: HOSPITAL | Age: 62
End: 2017-02-18

## 2017-02-18 LAB
ANION GAP SERPL CALCULATED.3IONS-SCNC: 5 MMOL/L (ref 3–11)
BASOPHILS # BLD AUTO: 0.02 10*3/MM3 (ref 0–0.2)
BASOPHILS NFR BLD AUTO: 0.3 % (ref 0–1)
BUN BLD-MCNC: 11 MG/DL (ref 9–23)
BUN/CREAT SERPL: 12.2 (ref 7–25)
CALCIUM SPEC-SCNC: 8.8 MG/DL (ref 8.7–10.4)
CHLORIDE SERPL-SCNC: 108 MMOL/L (ref 99–109)
CO2 SERPL-SCNC: 27 MMOL/L (ref 20–31)
CREAT BLD-MCNC: 0.9 MG/DL (ref 0.6–1.3)
DEPRECATED RDW RBC AUTO: 43.1 FL (ref 37–54)
EOSINOPHIL # BLD AUTO: 0.42 10*3/MM3 (ref 0.1–0.3)
EOSINOPHIL NFR BLD AUTO: 6 % (ref 0–3)
ERYTHROCYTE [DISTWIDTH] IN BLOOD BY AUTOMATED COUNT: 12.6 % (ref 11.3–14.5)
GFR SERPL CREATININE-BSD FRML MDRD: 86 ML/MIN/1.73
GLUCOSE BLD-MCNC: 82 MG/DL (ref 70–100)
HCT VFR BLD AUTO: 24.2 % (ref 38.9–50.9)
HCT VFR BLD AUTO: 24.9 % (ref 38.9–50.9)
HGB BLD-MCNC: 8.1 G/DL (ref 13.1–17.5)
HGB BLD-MCNC: 8.4 G/DL (ref 13.1–17.5)
IMM GRANULOCYTES # BLD: 0.06 10*3/MM3 (ref 0–0.03)
IMM GRANULOCYTES NFR BLD: 0.9 % (ref 0–0.6)
LYMPHOCYTES # BLD AUTO: 1.37 10*3/MM3 (ref 0.6–4.8)
LYMPHOCYTES NFR BLD AUTO: 19.6 % (ref 24–44)
MCH RBC QN AUTO: 31.6 PG (ref 27–31)
MCHC RBC AUTO-ENTMCNC: 33.5 G/DL (ref 32–36)
MCV RBC AUTO: 94.5 FL (ref 80–99)
MONOCYTES # BLD AUTO: 1.03 10*3/MM3 (ref 0–1)
MONOCYTES NFR BLD AUTO: 14.7 % (ref 0–12)
NEUTROPHILS # BLD AUTO: 4.09 10*3/MM3 (ref 1.5–8.3)
NEUTROPHILS NFR BLD AUTO: 58.5 % (ref 41–71)
PLATELET # BLD AUTO: 172 10*3/MM3 (ref 150–450)
PMV BLD AUTO: 8.7 FL (ref 6–12)
POTASSIUM BLD-SCNC: 3.9 MMOL/L (ref 3.5–5.5)
RBC # BLD AUTO: 2.56 10*6/MM3 (ref 4.2–5.76)
SODIUM BLD-SCNC: 140 MMOL/L (ref 132–146)
WBC NRBC COR # BLD: 6.99 10*3/MM3 (ref 3.5–10.8)

## 2017-02-18 PROCEDURE — 74176 CT ABD & PELVIS W/O CONTRAST: CPT

## 2017-02-18 PROCEDURE — 85025 COMPLETE CBC W/AUTO DIFF WBC: CPT | Performed by: HOSPITALIST

## 2017-02-18 PROCEDURE — 25010000002 PIPERACILLIN SOD-TAZOBACTAM PER 1 G: Performed by: SURGERY

## 2017-02-18 PROCEDURE — 80048 BASIC METABOLIC PNL TOTAL CA: CPT | Performed by: HOSPITALIST

## 2017-02-18 PROCEDURE — 25010000002 HYDROMORPHONE PER 4 MG: Performed by: SURGERY

## 2017-02-18 PROCEDURE — 99232 SBSQ HOSP IP/OBS MODERATE 35: CPT | Performed by: NURSE PRACTITIONER

## 2017-02-18 PROCEDURE — 85014 HEMATOCRIT: CPT | Performed by: NURSE PRACTITIONER

## 2017-02-18 PROCEDURE — 85018 HEMOGLOBIN: CPT | Performed by: NURSE PRACTITIONER

## 2017-02-18 RX ADMIN — DOCUSATE SODIUM 100 MG: 100 CAPSULE, LIQUID FILLED ORAL at 09:14

## 2017-02-18 RX ADMIN — HYDROCODONE BITARTRATE AND ACETAMINOPHEN 2 TABLET: 5; 325 TABLET ORAL at 06:14

## 2017-02-18 RX ADMIN — PANTOPRAZOLE SODIUM 40 MG: 40 TABLET, DELAYED RELEASE ORAL at 17:31

## 2017-02-18 RX ADMIN — HYDROCODONE BITARTRATE AND ACETAMINOPHEN 2 TABLET: 5; 325 TABLET ORAL at 00:01

## 2017-02-18 RX ADMIN — DOCUSATE SODIUM AND SENNOSIDES 2 TABLET: 8.6; 5 TABLET, FILM COATED ORAL at 17:31

## 2017-02-18 RX ADMIN — HYDROCODONE BITARTRATE AND ACETAMINOPHEN 2 TABLET: 5; 325 TABLET ORAL at 13:22

## 2017-02-18 RX ADMIN — DOCUSATE SODIUM 100 MG: 100 CAPSULE, LIQUID FILLED ORAL at 17:31

## 2017-02-18 RX ADMIN — TAZOBACTAM SODIUM AND PIPERACILLIN SODIUM 3.38 G: 375; 3 INJECTION, SOLUTION INTRAVENOUS at 00:06

## 2017-02-18 RX ADMIN — HYDROCHLOROTHIAZIDE 12.5 MG: 12.5 TABLET ORAL at 09:14

## 2017-02-18 RX ADMIN — TAZOBACTAM SODIUM AND PIPERACILLIN SODIUM 3.38 G: 375; 3 INJECTION, SOLUTION INTRAVENOUS at 09:30

## 2017-02-18 RX ADMIN — PANTOPRAZOLE SODIUM 40 MG: 40 TABLET, DELAYED RELEASE ORAL at 09:13

## 2017-02-18 RX ADMIN — DOCUSATE SODIUM AND SENNOSIDES 2 TABLET: 8.6; 5 TABLET, FILM COATED ORAL at 09:14

## 2017-02-18 RX ADMIN — HYDROCODONE BITARTRATE AND ACETAMINOPHEN 2 TABLET: 5; 325 TABLET ORAL at 21:50

## 2017-02-18 RX ADMIN — TAZOBACTAM SODIUM AND PIPERACILLIN SODIUM 3.38 G: 375; 3 INJECTION, SOLUTION INTRAVENOUS at 16:00

## 2017-02-18 RX ADMIN — HYDROMORPHONE HYDROCHLORIDE 0.5 MG: 1 INJECTION, SOLUTION INTRAMUSCULAR; INTRAVENOUS; SUBCUTANEOUS at 16:21

## 2017-02-18 NOTE — PROGRESS NOTES
[facility] Medicine Services  INPATIENT PROGRESS NOTE    Date of Admission: 2/14/2017  Length of Stay: 3  Primary Care Physician: No Known Provider    Subjective-- HPI/Events overnight/ROS/CC- Hospital follow visit.  Detailed ROS not detailed as performed below    Feeling ok today. Eating well. No significant pain. Abd still distended and tight. No f/c/s. No n/v/d. +BM normal yesterday. No hematuria. No orthostasis    Objective      Temp:  [97.4 °F (36.3 °C)-98.2 °F (36.8 °C)] 97.7 °F (36.5 °C)  Heart Rate:  [] 88  Resp:  [16-18] 18  BP: (116-145)/(63-80) 116/67    Physical Exam:  Physical Exam    General Assessment: No acute cardiopulmonary distress. Well developed and well nourished.    HEENT: NCAT, PERRL, MM    Neck: Supple, no carotid bruit bilat    CVS: RRR, S1S2 normal, no murmurs    Resp: CTAB, no adventitious sound    Abd: Obese, soft, incisional tenderness, Distended, normal BS, no guarding or peritoneal signs, dressings C/D/I    Ext: No edema, both calves are symmetric and NTTP    Neuro: Nonfocal    Skin: W/D/I. No rash. Mild erythema surrounding umbilical dressing. Ecchymosis right abd RLQ    Psych: Affect is appropriate          Results Review:    I have reviewed the labs, radiology results and diagnostic studies.      Results from last 7 days  Lab Units 02/18/17  0412   WBC 10*3/mm3 6.99   HEMOGLOBIN g/dL 8.1*   PLATELETS 10*3/mm3 172       Results from last 7 days  Lab Units 02/18/17  0412   SODIUM mmol/L 140   POTASSIUM mmol/L 3.9   TOTAL CO2 mmol/L 27.0   CREATININE mg/dL 0.90   GLUCOSE mg/dL 82       Culture Data:  Radiology Data:     I have reviewed the medications.        Assessment/Plan     Assessment/Problem List  Principal Problem:    Cholecystitis  Active Problems:    Right ureteral stone    Right upper quadrant abdominal pain    HTN (hypertension)    Gall stones    Hypotension    Hematoma/Post Op bleeding    Anemia, ABL from surgery + dilutional      Plan    -  Resumed home anti-hypertensive meds and were held 2/2 to hypotension. Hctz given today and BP stable, continue to monitor  - For evac of hematoma yesterday and only minimal pulled (appx 50ml) per Dr. HARDY  - Continued drop in H/H with distended abd. CT abd/ pelvis now  - check CBC in am, H/H trending down, likely ABL from surgery + dilutional ? Retroperitoneal bleed  - Discussed with Dr. HARDY today. Poss home tomorrow If CT abd and H/H stable in am. Will have close follow up outpatient  - Path pending      Ella Rivas, APRN   02/18/17   10:27 AM    Please note that portions of this note may have been completed with a voice recognition program. Efforts were made to edit the dictations, but occasionally words are mistranscribed.

## 2017-02-18 NOTE — OP NOTE
DATE OF PROCEDURE:  02/17/2017     PREOPERATIVE DIAGNOSIS: Intra-abdominal hematoma, status post laparoscopic cholecystectomy.     POSTOPERATIVE DIAGNOSIS: Intra-abdominal hematoma following laparoscopic cholecystectomy.     PROCEDURE PERFORMED: Diagnostic laparoscopy and evacuation of hematoma.     SURGEON: Ryan Corona MD    ANESTHESIA: General.     COMPLICATIONS: None.     FINDINGS: The patient had a  minimal amount of blood in the abdomen noted. Right colon was distended; however, viable.     INDICATIONS: The patient is a pleasant 61-year-old gentleman who underwent laparoscopic cholecystectomy and repair of umbilical hernia on 02/15/2017 secondary to acute calculous cholecystitis. He tolerated the procedure well and postoperatively in the evening he developed tachycardia which happened shortly after receiving heparin subcutaneous injection. Laboratory values at that time showed a drop in his hemoglobin down to 11.9. He was having adequate urine output with a stable blood pressure. He was monitored closely and over the pursuant day he complained of abdominal distention and was noted to have some blood oozing from his upper abdominal puncture sites. Heparin was stopped and repeat CBC this morning showed a hemoglobin of 9 with hematocrit of 27. However, the patient has been maintaining adequate blood pressure as well as a very good urine output. However, due to distention and the fact that he has been having difficulty ambulating, I recommended proceeding with diagnostic laparoscopy and evacuation of the hematoma to facilitate his recovery. Note that he has not had bowel movement since admission even though he has been on stool softeners.     DESCRIPTION OF PROCEDURE: The patient was taken to the operating room by Anesthesia and placed supine on the table. Following induction of general endotracheal anesthesia, TEDs and SCDs were placed. He received 2 g of Kefzol IV. The abdomen was prepped and draped in  sterile fashion. A timeout was observed. Marcaine 0.5% was injected around the umbilicus and the incision was incised. The Ethibond sutures were then removed and the Ana Paula introducer was advanced through the fascial defect and the abdomen insufflated to 15 mmHg using CO2. The 10 mm scope was advanced and abdomen inspected. He had very minimal amount of blood in the abdomen with a distended colon, mostly right-sided. The patient was placed in reverse Trendelenburg position, right side up. An 11 mm trocar was placed through the previous puncture site in the right upper quadrant. I proceeded by suctioning the blood in the upper quadrant, which was  minimal, and the site was well irrigated with normal saline. The gallbladder fossa was covered by the distended colon and mesentery. As such, I avoided manipulating the site to avoid any injury to the structures. I also inspected the pelvis where a  minimal amount of blood was noted. This was suctioned accordingly and irrigated well. I could not appreciate any other abnormalities except the fact that the right colon was distended; however, viable. Minimal dilatation of the small bowel was also noted. At this time, we elected to avoid any further dissection and proceeded by removing the trocars under direct vision. The abdomen was deflated. The umbilical fascia was approximated with interrupted 0 Ethibond sutures. The umbilicus was anchored to the underlying fascia with 3-0 Vicryl sutures and the subcutaneous tissue was approximated with interrupted 3-0 Vicryl sutures. The skin incisions were approximated with 4-0 Monocryl subcuticular suture. Steri-Strips and sterile dressing were applied. The patient tolerated the procedure well with no complications. He was extubated and taken to the recovery room in stable condition. Plan to start him on Dulcolax suppositories as well as possible enemas to facilitate his colon function and monitor the hemoglobin and hematocrit closely. I  am definitely concerned about possible blood in the bowel, which can be monitored following a bowel movement as well. Sponge count and needle count were correct at the end of the procedure.       MD SAMI Navas/nic  DD: 02/17/2017 16:43:49  DT: 02/17/2017 19:49:02  Voice Rec. ID #[id number]  Voice Original ID #[id number]  Doc ID #[id number]  Rev. #[id number]  cc:

## 2017-02-18 NOTE — PLAN OF CARE
Problem: Cholecystitis/Cholecystectomy (Adult)  Goal: Signs and Symptoms of Listed Potential Problems Will be Absent or Manageable (Cholecystitis/Cholecystectomy)  Outcome: Ongoing (interventions implemented as appropriate)    02/18/17 1634   Cholecystitis/Cholecystectomy   Problems Assessed (Cholecystitis/Cholecystectomy) all   Problems Present (Cholecystitis/Cholecystectomy) pain         Problem: Perioperative Period (Adult)  Goal: Signs and Symptoms of Listed Potential Problems Will be Absent or Manageable (Perioperative Period)  Outcome: Ongoing (interventions implemented as appropriate)    02/18/17 1634   Perioperative Period   Problems Assessed (Perioperative Period) all   Problems Present (Perioperative Period) pain         Problem: Pressure Ulcer Risk (Maximiliano Scale) (Adult,Obstetrics,Pediatric)  Goal: Identify Related Risk Factors and Signs and Symptoms  Outcome: Ongoing (interventions implemented as appropriate)    02/18/17 1634   Pressure Ulcer Risk (Maximiliano Scale)   Related Risk Factors (Pressure Ulcer Risk (Maximiliano Scale)) fluid intake inadequate       Goal: Skin Integrity  Outcome: Ongoing (interventions implemented as appropriate)    Problem: Pain, Acute (Adult)  Goal: Identify Related Risk Factors and Signs and Symptoms  Outcome: Ongoing (interventions implemented as appropriate)    02/18/17 1634   Pain, Acute   Related Risk Factors (Acute Pain) surgery   Signs and Symptoms (Acute Pain) constipation/diarrhea       Goal: Acceptable Pain Control/Comfort Level  Outcome: Ongoing (interventions implemented as appropriate)

## 2017-02-18 NOTE — PROGRESS NOTES
"Antonino Barrera  1955  [id number]    Surgery Progress Note    Date of visit: 2/18/2017    Subjective:overall feels better  Had BM last night  Voiding well  Taking PO    Objective:    Visit Vitals   • /67   • Pulse 88   • Temp 97.7 °F (36.5 °C) (Oral)   • Resp 18   • Ht 69\" (175.3 cm)   • Wt 200 lb (90.7 kg)   • SpO2 97%   • BMI 29.53 kg/m2       Intake/Output Summary (Last 24 hours) at 02/18/17 0957  Last data filed at 02/18/17 0900   Gross per 24 hour   Intake   1395 ml   Output   4575 ml   Net  -3180 ml       CV: Regular rate and rhythm  L: normal air entry    Abd: distended, soft with active BS. Puncture site tenderness    LABS:      Results from last 7 days  Lab Units 02/18/17  0412   WBC 10*3/mm3 6.99   HEMOGLOBIN g/dL 8.1*   HEMATOCRIT % 24.2*   PLATELETS 10*3/mm3 172       Results from last 7 days  Lab Units 02/18/17  0412 02/17/17  0427   SODIUM mmol/L 140 138   POTASSIUM mmol/L 3.9 3.7   CHLORIDE mmol/L 108 108   TOTAL CO2 mmol/L 27.0 24.0   BUN mg/dL 11 21   CREATININE mg/dL 0.90 1.50*   CALCIUM mg/dL 8.8 8.4*   BILIRUBIN mg/dL  --  0.6   ALK PHOS U/L  --  39   ALT (SGPT) U/L  --  36   AST (SGOT) U/L  --  26   GLUCOSE mg/dL 82 96       Results from last 7 days  Lab Units 02/18/17  0412   SODIUM mmol/L 140   POTASSIUM mmol/L 3.9   CHLORIDE mmol/L 108   TOTAL CO2 mmol/L 27.0   BUN mg/dL 11   CREATININE mg/dL 0.90   GLUCOSE mg/dL 82   CALCIUM mg/dL 8.8       0  Lab Value Date/Time   LIPASE 33 02/14/2017 1108         Assessment/ Plan: s/p lap cholecystectomy  Drop in H/H which prompted DX laparoscopy yesterday with no evidence of large intraabdominal hematoma to account for drop in H/H  No evidence of GI bleed  Will proceed with CT scan of abdomen and pelvis to evaluate for possible retroperitoneal hematoma  Overall stable and progressing well.  BUN/CR normal.      Problem List Items Addressed This Visit     Right ureteral stone - Primary    Right upper quadrant abdominal pain    * " (Principal)Cholecystitis      Other Visit Diagnoses     Gallstones        Relevant Orders    Tissue Exam (Completed)            Ryan Corona MD  2/18/2017  9:57 AM

## 2017-02-19 LAB
DEPRECATED RDW RBC AUTO: 42.4 FL (ref 37–54)
ERYTHROCYTE [DISTWIDTH] IN BLOOD BY AUTOMATED COUNT: 12.6 % (ref 11.3–14.5)
HCT VFR BLD AUTO: 25.7 % (ref 38.9–50.9)
HGB BLD-MCNC: 8.6 G/DL (ref 13.1–17.5)
MCH RBC QN AUTO: 31.3 PG (ref 27–31)
MCHC RBC AUTO-ENTMCNC: 33.5 G/DL (ref 32–36)
MCV RBC AUTO: 93.5 FL (ref 80–99)
PLATELET # BLD AUTO: 226 10*3/MM3 (ref 150–450)
PMV BLD AUTO: 9.1 FL (ref 6–12)
RBC # BLD AUTO: 2.75 10*6/MM3 (ref 4.2–5.76)
WBC NRBC COR # BLD: 6.83 10*3/MM3 (ref 3.5–10.8)

## 2017-02-19 PROCEDURE — 63710000001 DIPHENHYDRAMINE PER 50 MG: Performed by: NURSE PRACTITIONER

## 2017-02-19 PROCEDURE — 85027 COMPLETE CBC AUTOMATED: CPT | Performed by: SURGERY

## 2017-02-19 PROCEDURE — 87070 CULTURE OTHR SPECIMN AEROBIC: CPT | Performed by: NURSE PRACTITIONER

## 2017-02-19 PROCEDURE — 99232 SBSQ HOSP IP/OBS MODERATE 35: CPT | Performed by: NURSE PRACTITIONER

## 2017-02-19 PROCEDURE — 25010000002 PIPERACILLIN SOD-TAZOBACTAM PER 1 G: Performed by: SURGERY

## 2017-02-19 PROCEDURE — 87205 SMEAR GRAM STAIN: CPT | Performed by: NURSE PRACTITIONER

## 2017-02-19 RX ORDER — DIAPER,BRIEF,INFANT-TODD,DISP
EACH MISCELLANEOUS EVERY 12 HOURS SCHEDULED
Status: DISCONTINUED | OUTPATIENT
Start: 2017-02-19 | End: 2017-02-21 | Stop reason: HOSPADM

## 2017-02-19 RX ORDER — DIPHENHYDRAMINE HCL 25 MG
25 CAPSULE ORAL EVERY 6 HOURS PRN
Status: DISCONTINUED | OUTPATIENT
Start: 2017-02-19 | End: 2017-02-21 | Stop reason: HOSPADM

## 2017-02-19 RX ADMIN — DOCUSATE SODIUM 100 MG: 100 CAPSULE, LIQUID FILLED ORAL at 08:12

## 2017-02-19 RX ADMIN — DOCUSATE SODIUM AND SENNOSIDES 2 TABLET: 8.6; 5 TABLET, FILM COATED ORAL at 08:12

## 2017-02-19 RX ADMIN — HYDROCODONE BITARTRATE AND ACETAMINOPHEN 2 TABLET: 5; 325 TABLET ORAL at 08:20

## 2017-02-19 RX ADMIN — HYDROCODONE BITARTRATE AND ACETAMINOPHEN 2 TABLET: 5; 325 TABLET ORAL at 04:10

## 2017-02-19 RX ADMIN — HYDROCORTISONE: 10 CREAM TOPICAL at 21:22

## 2017-02-19 RX ADMIN — TAZOBACTAM SODIUM AND PIPERACILLIN SODIUM 3.38 G: 375; 3 INJECTION, SOLUTION INTRAVENOUS at 00:47

## 2017-02-19 RX ADMIN — TAZOBACTAM SODIUM AND PIPERACILLIN SODIUM 3.38 G: 375; 3 INJECTION, SOLUTION INTRAVENOUS at 08:21

## 2017-02-19 RX ADMIN — DOCUSATE SODIUM 100 MG: 100 CAPSULE, LIQUID FILLED ORAL at 17:05

## 2017-02-19 RX ADMIN — PANTOPRAZOLE SODIUM 40 MG: 40 TABLET, DELAYED RELEASE ORAL at 08:12

## 2017-02-19 RX ADMIN — DIPHENHYDRAMINE HYDROCHLORIDE 25 MG: 25 CAPSULE ORAL at 17:05

## 2017-02-19 RX ADMIN — PANTOPRAZOLE SODIUM 40 MG: 40 TABLET, DELAYED RELEASE ORAL at 17:09

## 2017-02-19 RX ADMIN — HYDROCODONE BITARTRATE AND ACETAMINOPHEN 2 TABLET: 5; 325 TABLET ORAL at 20:32

## 2017-02-19 NOTE — PLAN OF CARE
Problem: Cholecystitis/Cholecystectomy (Adult)  Goal: Signs and Symptoms of Listed Potential Problems Will be Absent or Manageable (Cholecystitis/Cholecystectomy)  Outcome: Ongoing (interventions implemented as appropriate)    02/19/17 0545   Cholecystitis/Cholecystectomy   Problems Assessed (Cholecystitis/Cholecystectomy) all   Problems Present (Cholecystitis/Cholecystectomy) pain         Problem: Pressure Ulcer Risk (Maximiliano Scale) (Adult,Obstetrics,Pediatric)  Goal: Identify Related Risk Factors and Signs and Symptoms  Outcome: Ongoing (interventions implemented as appropriate)    02/19/17 0545   Pressure Ulcer Risk (Maximiliano Scale)   Related Risk Factors (Pressure Ulcer Risk (Maximiliano Scale)) fluid intake inadequate       Goal: Skin Integrity  Outcome: Ongoing (interventions implemented as appropriate)    02/19/17 0545   Pressure Ulcer Risk (Maximiliano Scale) (Adult,Obstetrics,Pediatric)   Skin Integrity making progress toward outcome         Problem: Pain, Acute (Adult)  Goal: Identify Related Risk Factors and Signs and Symptoms  Outcome: Ongoing (interventions implemented as appropriate)    02/19/17 0545   Pain, Acute   Related Risk Factors (Acute Pain) surgery   Signs and Symptoms (Acute Pain) constipation/diarrhea       Goal: Acceptable Pain Control/Comfort Level  Outcome: Ongoing (interventions implemented as appropriate)    02/19/17 0545   Pain, Acute (Adult)   Acceptable Pain Control/Comfort Level making progress toward outcome

## 2017-02-19 NOTE — PLAN OF CARE
Problem: Patient Care Overview (Adult)  Goal: Adult Individualization and Mutuality    02/16/17 1822   Individualization   Patient Specific Goals Pain<4; No falls or injury   Patient Specific Interventions Assess pain q2h & prn; hourly rounds & call light within reach         Problem: Cholecystitis/Cholecystectomy (Adult)  Goal: Signs and Symptoms of Listed Potential Problems Will be Absent or Manageable (Cholecystitis/Cholecystectomy)  Outcome: Ongoing (interventions implemented as appropriate)    Problem: Perioperative Period (Adult)  Goal: Signs and Symptoms of Listed Potential Problems Will be Absent or Manageable (Perioperative Period)  Outcome: Ongoing (interventions implemented as appropriate)    02/18/17 1634   Perioperative Period   Problems Assessed (Perioperative Period) all   Problems Present (Perioperative Period) pain         Problem: Pressure Ulcer Risk (Maximiliano Scale) (Adult,Obstetrics,Pediatric)  Goal: Identify Related Risk Factors and Signs and Symptoms  Outcome: Ongoing (interventions implemented as appropriate)    02/19/17 0545   Pressure Ulcer Risk (Maximiliano Scale)   Related Risk Factors (Pressure Ulcer Risk (Maximiliano Scale)) fluid intake inadequate       Goal: Skin Integrity  Outcome: Ongoing (interventions implemented as appropriate)    02/19/17 0545   Pressure Ulcer Risk (Maximiliano Scale) (Adult,Obstetrics,Pediatric)   Skin Integrity making progress toward outcome         Problem: Pain, Acute (Adult)  Goal: Identify Related Risk Factors and Signs and Symptoms  Outcome: Ongoing (interventions implemented as appropriate)    02/19/17 0545   Pain, Acute   Related Risk Factors (Acute Pain) surgery   Signs and Symptoms (Acute Pain) constipation/diarrhea

## 2017-02-19 NOTE — PROGRESS NOTES
"    [facility] Medicine Services  INPATIENT PROGRESS NOTE    Date of Admission: 2/14/2017  Length of Stay: 4  Primary Care Physician: No Known Provider    Subjective     Chief Complaint: Rt abd soreness/ improving     HPI:  Delayed entry note.  Patient seen at 9:05 AM today resting upright in bed in no acute distress.  Reports he's tolerating diet without nausea or vomiting.  States his rt abdominal pain and right side pain is slowly improving.  Air that his H/H is stable, and slightly improved this morning.  Results of his CT yesterday.  Eighths he \"feels a little bit better\".  Reports having good bowel movement overnight.  Denies, shortness of air, dizziness.  Ambulating independently.  No new issues.      Review of Systems  Gen:  No appetite change, chills, fever   Respiratory: As negative for cough, chest tightness, chest pain, shortness of air   Cardio: No chest pain/palpitations.  Positive for bilateral lower extremity swelling  Abdominal: As positive for generalized abdominal tenderness and right sided tenderness.  Negative for nausea, constipation, melena, hematochezia, nausea, vomiting  Musculoskeletal: He is negative for gait difficulties, myalgias, joint swelling.  Positive for chronic intermittent back pain  Skin/incision: Positive for abdominal lap sites with dressings and right side flank bruising   Neuro:Negative for dizziness, syncope, lightheadedness, headache    Objective      Temp:  [98 °F (36.7 °C)-98.8 °F (37.1 °C)] 98.2 °F (36.8 °C)  Heart Rate:  [70-82] 77  Resp:  [18] 18  BP: (114-148)/(67-76) 130/67     Physical Exam  General Assessment: No acute cardiopulmonary distress. Well developed and well nourished. No visitors at bs.   HEENT: NCAT, PERRL, MM  Neck: Supple, trachea midline  CVS: RRR, S1S2 normal, no murmurs  Resp: CTAB, no adventitious sounds  Abd: Obese, soft, incisional tenderness, Distended, normal BS, no guarding or peritoneal signs, dressings C/D/I x 4 " laps  Ext: trace bilateral lower extremity  edema, both calves are symmetric and NTT  Neuro: Nonfocal, alert and oriented ×4   Skin: W/D/I. No rash. Mild erythema surrounding umbilical dressing. Ecchymosis right abd/right flank   Psych: Affect is appropriate, pleasant, calm, cooperative     Results Review:    I have reviewed the labs, radiology results and diagnostic studies.      Results from last 7 days  Lab Units 02/19/17  0413   WBC 10*3/mm3 6.83   HEMOGLOBIN g/dL 8.6*   PLATELETS 10*3/mm3 226       Results from last 7 days  Lab Units 02/18/17  0412   SODIUM mmol/L 140   POTASSIUM mmol/L 3.9   TOTAL CO2 mmol/L 27.0   CREATININE mg/dL 0.90   GLUCOSE mg/dL 82     Radiology Data:   Imaging Results (last 24 hours)     Procedure Component Value Units Date/Time    CT Abdomen Pelvis Without Contrast [[id number]] Collected:  02/18/17 1550     Updated:  02/19/17 0950    Narrative:       EXAMINATION: CT ABDOMEN AND PELVIS WO CONTRAST - 02/18/2017      INDICATION: N20.1-Calculus of ureter; K81.9-Cholecystitis, unspecified;  R10.11-Right upper quadrant pain; K80.20-Calculus of gallbladder without  cholecystitis without obstruction. Possible retroperitoneal bleed.     TECHNIQUE: Multislice helical CT with two-dimensional reformatted  images.     The radiation dose reduction device was turned on for each scan per the  ALARA (As Low as Reasonably Achievable) protocol.     COMPARISON: 2/14/2017     FINDINGS: Trace pleural fluid is present in the right hemithorax. There  is some atelectasis in the posterior portions of the lower lobes. There  is transmural thickening of the distal third of the esophagus. The heart  size is normal.     The hepatic parenchyma is free of masses. A hematoma is present in the  subhepatic space measuring approximately 13 x 7.8 x 8.6 cm. The stomach  and proximal small bowel are normal. The hepatic parenchyma is grossly  unremarkable. There is some hematoma in the gallbladder fossa. The  spleen and  pancreas are normal. The adrenal glands are normal. The renal  contours are smooth. There is no hydronephrosis or hydroureter.     PELVIS: There is no significant free pelvic fluid. The bladder is  partially distended. The prostate gland is normal. The sigmoid colon  contains diverticula. There is no significant inguinal adenopathy. The  osseous structures of the abdomen and pelvis are normal.       Impression:       Intraperitoneal hematoma in the subhepatic space arising  from the gallbladder fossa. The collection measures approximately 13 x  7.8 x 8.6 cm.     DICTATED:     02/18/2017  EDITED:         02/18/2017     This report was finalized on 2/19/2017 9:48 AM by Dr. Ferdinand Barrera MD.               I have reviewed the medications.    Scheduled Meds:  docusate sodium 100 mg Oral BID   lisinopril 10 mg Oral Q24H   And      hydrochlorothiazide 12.5 mg Oral Daily   pantoprazole 40 mg Oral BID AC   piperacillin-tazobactam 3.375 g Intravenous Q8H   sennosides-docusate sodium 2 tablet Oral BID     Continuous Infusions:  sodium chloride 50 mL/hr Last Rate: 100 mL/hr (02/17/17 0187)     PRN Meds:.•  acetaminophen  •  bisacodyl  •  HYDROcodone-acetaminophen  •  HYDROmorphone **AND** naloxone  •  ondansetron **OR** ondansetron  •  simethicone  •  sodium chloride      Assessment/Plan     Assessment/Problem List  Principal Problem:    Cholecystitis  Active Problems:    Right ureteral stone    Right upper quadrant abdominal pain    HTN (hypertension)    Gall stones    Hypotension    Hematoma/Post Op bleeding    Anemia, ABL from surgery + dilutional      Plan:    Cholecystitis sp CCy and umb hernia repair (s/p back to sx for ex lap 2/17 for hematoma evac 50 mls)      - Resumed home anti-hypertensive meds and were held 2/2 to hypotension. Hctz given yesterday and BP stable, continue to monitor  - For evac of hematoma 2/17 and only minimal pulled (appx 50ml) per Dr. HARDY  - Continued drop in H/H with distended abd. CT abd/ pelvis  yesterday.  Pending Surgery eval but s/s improving and h/h stable to slighty improved today  - check CBC/bmp in am  - ?Retroperitoneal bleed  - Discussed with Dr. HARDY yesterday per provider.  - Path pending  -Possible home tomorrow pending surgery recs.  Also pt with no ride home until tomorrow.    -Will need close f/u outpt with PCP and General sx     Keesha Hernandez, APRN   02/19/17   11:09 AM    Please note that portions of this note may have been completed with a voice recognition program. Efforts were made to edit the dictations, but occasionally words are mistranscribed.

## 2017-02-19 NOTE — PROGRESS NOTES
"General Surgery Post op Follow Up Note    Subjective:   Feeling a bit better.  Bm today.    Visit Vitals   • /78 (BP Location: Right arm, Patient Position: Lying)   • Pulse 72   • Temp 98.7 °F (37.1 °C) (Oral)   • Resp 18   • Ht 69\" (175.3 cm)   • Wt 200 lb (90.7 kg)   • SpO2 97%   • BMI 29.53 kg/m2       General Appearance:  in no acute distress  Abdomen: incisions are clean,  large right flank bruise noted.      CBC    Results from last 7 days  Lab Units 02/19/17  0413   WBC 10*3/mm3 6.83   HEMOGLOBIN g/dL 8.6*   HEMATOCRIT % 25.7*   PLATELETS 10*3/mm3 226       CMP/BMP    Results from last 7 days  Lab Units 02/18/17  0412 02/17/17  0427   SODIUM mmol/L 140 138   POTASSIUM mmol/L 3.9 3.7   CHLORIDE mmol/L 108 108   TOTAL CO2 mmol/L 27.0 24.0   BUN mg/dL 11 21   CREATININE mg/dL 0.90 1.50*   CALCIUM mg/dL 8.8 8.4*   BILIRUBIN mg/dL  --  0.6   ALK PHOS U/L  --  39   ALT (SGPT) U/L  --  36   AST (SGOT) U/L  --  26   GLUCOSE mg/dL 82 96         ASSESSMENT/PLAN:    S/p lap reed    Feeling better.  Advance diet to regular.  Hgb stable.  Hematoma noted on CT.    Porter Desai MD  2/19/2017  5:10 PM  "

## 2017-02-20 LAB
ANION GAP SERPL CALCULATED.3IONS-SCNC: 4 MMOL/L (ref 3–11)
BASOPHILS # BLD AUTO: 0.02 10*3/MM3 (ref 0–0.2)
BASOPHILS NFR BLD AUTO: 0.3 % (ref 0–1)
BUN BLD-MCNC: 10 MG/DL (ref 9–23)
BUN/CREAT SERPL: 14.3 (ref 7–25)
CALCIUM SPEC-SCNC: 9.6 MG/DL (ref 8.7–10.4)
CHLORIDE SERPL-SCNC: 104 MMOL/L (ref 99–109)
CO2 SERPL-SCNC: 30 MMOL/L (ref 20–31)
CREAT BLD-MCNC: 0.7 MG/DL (ref 0.6–1.3)
DEPRECATED RDW RBC AUTO: 42 FL (ref 37–54)
EOSINOPHIL # BLD AUTO: 0.31 10*3/MM3 (ref 0.1–0.3)
EOSINOPHIL NFR BLD AUTO: 4.8 % (ref 0–3)
ERYTHROCYTE [DISTWIDTH] IN BLOOD BY AUTOMATED COUNT: 12.4 % (ref 11.3–14.5)
GFR SERPL CREATININE-BSD FRML MDRD: 114 ML/MIN/1.73
GLUCOSE BLD-MCNC: 88 MG/DL (ref 70–100)
HCT VFR BLD AUTO: 26.6 % (ref 38.9–50.9)
HGB BLD-MCNC: 9.1 G/DL (ref 13.1–17.5)
IMM GRANULOCYTES # BLD: 0.13 10*3/MM3 (ref 0–0.03)
IMM GRANULOCYTES NFR BLD: 2 % (ref 0–0.6)
LYMPHOCYTES # BLD AUTO: 1.17 10*3/MM3 (ref 0.6–4.8)
LYMPHOCYTES NFR BLD AUTO: 18.1 % (ref 24–44)
MCH RBC QN AUTO: 31.7 PG (ref 27–31)
MCHC RBC AUTO-ENTMCNC: 34.2 G/DL (ref 32–36)
MCV RBC AUTO: 92.7 FL (ref 80–99)
MONOCYTES # BLD AUTO: 0.87 10*3/MM3 (ref 0–1)
MONOCYTES NFR BLD AUTO: 13.5 % (ref 0–12)
NEUTROPHILS # BLD AUTO: 3.96 10*3/MM3 (ref 1.5–8.3)
NEUTROPHILS NFR BLD AUTO: 61.3 % (ref 41–71)
PLATELET # BLD AUTO: 263 10*3/MM3 (ref 150–450)
PMV BLD AUTO: 8.9 FL (ref 6–12)
POTASSIUM BLD-SCNC: 3.4 MMOL/L (ref 3.5–5.5)
RBC # BLD AUTO: 2.87 10*6/MM3 (ref 4.2–5.76)
SODIUM BLD-SCNC: 138 MMOL/L (ref 132–146)
WBC NRBC COR # BLD: 6.46 10*3/MM3 (ref 3.5–10.8)

## 2017-02-20 PROCEDURE — 99232 SBSQ HOSP IP/OBS MODERATE 35: CPT | Performed by: NURSE PRACTITIONER

## 2017-02-20 PROCEDURE — 80048 BASIC METABOLIC PNL TOTAL CA: CPT | Performed by: NURSE PRACTITIONER

## 2017-02-20 PROCEDURE — 63710000001 DIPHENHYDRAMINE PER 50 MG: Performed by: NURSE PRACTITIONER

## 2017-02-20 PROCEDURE — 25010000002 VANCOMYCIN PER 500 MG

## 2017-02-20 PROCEDURE — 85025 COMPLETE CBC W/AUTO DIFF WBC: CPT | Performed by: NURSE PRACTITIONER

## 2017-02-20 RX ORDER — VANCOMYCIN HYDROCHLORIDE 1 G/200ML
1000 INJECTION, SOLUTION INTRAVENOUS EVERY 12 HOURS
Status: DISCONTINUED | OUTPATIENT
Start: 2017-02-20 | End: 2017-02-21 | Stop reason: HOSPADM

## 2017-02-20 RX ORDER — POTASSIUM CHLORIDE 1.5 G/1.77G
40 POWDER, FOR SOLUTION ORAL AS NEEDED
Status: DISCONTINUED | OUTPATIENT
Start: 2017-02-20 | End: 2017-02-21 | Stop reason: HOSPADM

## 2017-02-20 RX ORDER — POTASSIUM CHLORIDE 750 MG/1
40 CAPSULE, EXTENDED RELEASE ORAL AS NEEDED
Status: DISCONTINUED | OUTPATIENT
Start: 2017-02-20 | End: 2017-02-21 | Stop reason: HOSPADM

## 2017-02-20 RX ADMIN — HYDROCODONE BITARTRATE AND ACETAMINOPHEN 2 TABLET: 5; 325 TABLET ORAL at 21:18

## 2017-02-20 RX ADMIN — LISINOPRIL 10 MG: 10 TABLET ORAL at 09:56

## 2017-02-20 RX ADMIN — PANTOPRAZOLE SODIUM 40 MG: 40 TABLET, DELAYED RELEASE ORAL at 09:57

## 2017-02-20 RX ADMIN — HYDROCHLOROTHIAZIDE 12.5 MG: 12.5 TABLET ORAL at 09:57

## 2017-02-20 RX ADMIN — DOCUSATE SODIUM AND SENNOSIDES 2 TABLET: 8.6; 5 TABLET, FILM COATED ORAL at 09:57

## 2017-02-20 RX ADMIN — DOCUSATE SODIUM 100 MG: 100 CAPSULE, LIQUID FILLED ORAL at 09:57

## 2017-02-20 RX ADMIN — AZTREONAM 2 G: 2 INJECTION, POWDER, LYOPHILIZED, FOR SOLUTION INTRAMUSCULAR; INTRAVENOUS at 10:26

## 2017-02-20 RX ADMIN — PANTOPRAZOLE SODIUM 40 MG: 40 TABLET, DELAYED RELEASE ORAL at 19:13

## 2017-02-20 RX ADMIN — HYDROCORTISONE: 10 CREAM TOPICAL at 21:22

## 2017-02-20 RX ADMIN — HYDROCODONE BITARTRATE AND ACETAMINOPHEN 2 TABLET: 5; 325 TABLET ORAL at 02:05

## 2017-02-20 RX ADMIN — DOCUSATE SODIUM AND SENNOSIDES 2 TABLET: 8.6; 5 TABLET, FILM COATED ORAL at 19:13

## 2017-02-20 RX ADMIN — DIPHENHYDRAMINE HYDROCHLORIDE 25 MG: 25 CAPSULE ORAL at 10:26

## 2017-02-20 RX ADMIN — DOCUSATE SODIUM 100 MG: 100 CAPSULE, LIQUID FILLED ORAL at 19:13

## 2017-02-20 RX ADMIN — VANCOMYCIN HYDROCHLORIDE 1000 MG: 1 INJECTION, SOLUTION INTRAVENOUS at 21:19

## 2017-02-20 RX ADMIN — HYDROCORTISONE: 10 CREAM TOPICAL at 09:56

## 2017-02-20 RX ADMIN — AZTREONAM 2 G: 2 INJECTION, POWDER, LYOPHILIZED, FOR SOLUTION INTRAMUSCULAR; INTRAVENOUS at 19:13

## 2017-02-20 RX ADMIN — VANCOMYCIN HYDROCHLORIDE 1000 MG: 1 INJECTION, SOLUTION INTRAVENOUS at 11:35

## 2017-02-20 NOTE — PLAN OF CARE
Problem: Patient Care Overview (Adult)  Goal: Plan of Care Review  Outcome: Ongoing (interventions implemented as appropriate)  Goal: Adult Individualization and Mutuality  Outcome: Ongoing (interventions implemented as appropriate)  Goal: Discharge Needs Assessment  Outcome: Ongoing (interventions implemented as appropriate)    Problem: Cholecystitis/Cholecystectomy (Adult)  Goal: Signs and Symptoms of Listed Potential Problems Will be Absent or Manageable (Cholecystitis/Cholecystectomy)  Outcome: Ongoing (interventions implemented as appropriate)    Problem: Perioperative Period (Adult)  Goal: Signs and Symptoms of Listed Potential Problems Will be Absent or Manageable (Perioperative Period)  Outcome: Ongoing (interventions implemented as appropriate)    Problem: Pressure Ulcer Risk (Maximiliano Scale) (Adult,Obstetrics,Pediatric)  Goal: Identify Related Risk Factors and Signs and Symptoms  Outcome: Ongoing (interventions implemented as appropriate)  Goal: Skin Integrity  Outcome: Ongoing (interventions implemented as appropriate)    Problem: Pain, Acute (Adult)  Goal: Identify Related Risk Factors and Signs and Symptoms  Outcome: Ongoing (interventions implemented as appropriate)  Goal: Acceptable Pain Control/Comfort Level  Outcome: Ongoing (interventions implemented as appropriate)

## 2017-02-20 NOTE — PROGRESS NOTES
Continued Stay Note   Anabelle     Patient Name: Antonino Barrera  MRN: [id number]  Today's Date: 2/20/2017    Admit Date: 2/14/2017          Discharge Plan       02/20/17 1604    Case Management/Social Work Plan    Plan Update discharge plan    Patient/Family In Agreement With Plan yes    Additional Comments Spoke with patient regarding discharge plan.  Patient has some concerns regarding his incisions and infection,  Patient wanting to get better and get back to work.  CM will follow for discharge needs.  Patient plans to discharge home via car with family to transport when medically ready.                Discharge Codes     None        Expected Discharge Date and Time     Expected Discharge Date Expected Discharge Time    Feb 20, 2017             Harriet Romreo RN

## 2017-02-20 NOTE — PROGRESS NOTES
"    [facility] Medicine Services  INPATIENT PROGRESS NOTE    Date of Admission: 2/14/2017  Length of Stay: 5  Primary Care Physician: No Known Provider    Subjective     Chief Complaint: Rash     HPI:  Pt is seen sitting upright in bed in NAD.  NO visitors at bs.  States he feels okay but developed a bad rash overnight to his back, chest, and face/hair.  Also reports redness to \"belly inicision\".  Denies n/v, soa, cp, h/a, dizziness.  States general incisional tenderness and rt side tenderness but stable to improved.  Tolerating diet.  Ambulating. No other new issues.        Review of Systems  Gen: No appetite change, chills, fever   Respiratory: As negative for cough, chest tightness, chest pain, shortness of air   Cardio: No chest pain/palpitations. Positive for bilateral lower extremity swelling  Abdominal:  positive for generalized abdominal tenderness and right sided tenderness. Negative for nausea, constipation, melena, hematochezia, nausea, vomiting  Musculoskeletal: He is negative for gait difficulties, myalgias, joint swelling. Positive for chronic intermittent back pain  Skin/incision: Positive for abdominal lap sites with dressings and right side flank bruising.  New rash to back, upper chest, face/ears/and hair line.  Also redness around belly button incision overnight N  Neuro:Negative for dizziness, syncope, lightheadedness, headache    Objective      Temp:  [97.7 °F (36.5 °C)-98.7 °F (37.1 °C)] 97.7 °F (36.5 °C)  Heart Rate:  [72-96] 91  Resp:  [18] 18  BP: (136-154)/(66-78) 139/66     Physical Exam     General Assessment: No acute cardiopulmonary distress. Well developed and well nourished. No visitors at bs.   HEENT: NCAT, PERRL, MM  Neck: Supple, trachea midline  CVS: RRR, S1S2 normal, no murmurs  Resp: CTAB A/P, no adventitious sounds  Abd: Obese, soft, incisional tenderness, mild distended, normal BS, no guarding or peritoneal signs, dressings C/D/I x 4 laps.  See skin " below   Ext: No BLE edema, both calves are symmetric and NTT  Neuro: Nonfocal, alert and oriented ×4   Skin: W/D.  Noted erythematous rash with areas of maculopapular lesions to back and upper chest.  Erythema and scaling type rash to face/hair line/ears. Significant erythema surrounding umbilical dressing extending irregular shaped but marked per nursing staff and approx 6 inches in size. Mild warmth to touch. Other 3 upper lap sites c/d/i and without warmth, erythema. Ecchymosis right abd/right flank.    Psych: Affect is appropriate, pleasant, calm, cooperative     Results Review:    I have reviewed the labs, radiology results and diagnostic studies.      Results from last 7 days  Lab Units 02/20/17  0409   WBC 10*3/mm3 6.46   HEMOGLOBIN g/dL 9.1*   PLATELETS 10*3/mm3 263       Results from last 7 days  Lab Units 02/20/17  0409   SODIUM mmol/L 138   POTASSIUM mmol/L 3.4*   TOTAL CO2 mmol/L 30.0   CREATININE mg/dL 0.70   GLUCOSE mg/dL 88       Culture Data:   WOUND CULTURE   Date Value Ref Range Status   02/19/2017 No growth  Preliminary     Radiology Data:   Imaging Results (last 24 hours)     ** No results found for the last 24 hours. **            I have reviewed the medications.  Scheduled Meds:  aztreonam 2 g Intravenous Q8H   docusate sodium 100 mg Oral BID   lisinopril 10 mg Oral Q24H   And      hydrochlorothiazide 12.5 mg Oral Daily   hydrocortisone  Topical Q12H   pantoprazole 40 mg Oral BID AC   sennosides-docusate sodium 2 tablet Oral BID   vancomycin 1,000 mg Intravenous Q12H     Continuous Infusions:  Pharmacy to dose vancomycin      PRN Meds:.•  acetaminophen  •  bisacodyl  •  diphenhydrAMINE  •  HYDROcodone-acetaminophen  •  HYDROmorphone **AND** naloxone  •  ondansetron **OR** ondansetron  •  Pharmacy to dose vancomycin  •  simethicone  •  sodium chloride      Assessment/Plan     Assessment/Problem List  Principal Problem:    Cholecystitis  Active Problems:    Right ureteral stone    Right upper  quadrant abdominal pain    HTN (hypertension)    Gall stones    Hypotension    Hematoma/Post Op bleeding    Anemia, ABL from surgery + dilutional    Plan:     Cholecystitis sp CCY and umb hernia repair (s/p back to sx for ex lap 2/17 for hematoma evac 50 mls)  - Continued drop in H/H with distended abd post op. Back to sx for exp lap 2/17.  CT abd/ pelvis 2/18 positive for subhepatic hematoma. Surgery following. s/s improving and h/h stable to slighty improved and stable today  - check CBC/bmp in am  - Path pending    New rash to face/chest/back (possible due to zosyn)-   -surgery noted this am and dc'd zosyn and started vancomycin and azactam today  -hydrocortisone cream  -prn benadryl  -wound cx pending (NG<24 hours)    Incisional erythema (umbilical site)  -abx change as above  -continue to monitor  -surgery following     HTN  -stable on lisinopril and hctz    Hypokalemia  -replace per protocol   -ck labs in am         Disposition:  -Home pending surgery recs after resolution of new rash and umbilical incision site erythema.    -Will need close f/u outpt with PCP and General sx             Keesha Hernandez, APRN   02/20/17   11:55 AM    Please note that portions of this note may have been completed with a voice recognition program. Efforts were made to edit the dictations, but occasionally words are mistranscribed.

## 2017-02-20 NOTE — PROGRESS NOTES
"Antonino Barrera  1955  [id number]    Surgery Progress Note    Date of visit: 2/20/2017    Subjective:c/o rash  Zosyn stopped  Overall better  Ambulating and taking PO we        Objective:    Visit Vitals   • /77   • Pulse 76   • Temp 97.9 °F (36.6 °C) (Oral)   • Resp 18   • Ht 69\" (175.3 cm)   • Wt 200 lb (90.7 kg)   • SpO2 97%   • BMI 29.53 kg/m2       Intake/Output Summary (Last 24 hours) at 02/20/17 0807  Last data filed at 02/19/17 1300   Gross per 24 hour   Intake     50 ml   Output    750 ml   Net   -700 ml       CV: Regular rate and rhythm  L: normal air entry  Abd: soft with right flank bruising. umbilical port site with surrounding erythema.      LABS:      Results from last 7 days  Lab Units 02/20/17  0409   WBC 10*3/mm3 6.46   HEMOGLOBIN g/dL 9.1*   HEMATOCRIT % 26.6*   PLATELETS 10*3/mm3 263       Results from last 7 days  Lab Units 02/20/17  0409  02/17/17  0427   SODIUM mmol/L 138  < > 138   POTASSIUM mmol/L 3.4*  < > 3.7   CHLORIDE mmol/L 104  < > 108   TOTAL CO2 mmol/L 30.0  < > 24.0   BUN mg/dL 10  < > 21   CREATININE mg/dL 0.70  < > 1.50*   CALCIUM mg/dL 9.6  < > 8.4*   BILIRUBIN mg/dL  --   --  0.6   ALK PHOS U/L  --   --  39   ALT (SGPT) U/L  --   --  36   AST (SGOT) U/L  --   --  26   GLUCOSE mg/dL 88  < > 96   < > = values in this interval not displayed.    Results from last 7 days  Lab Units 02/20/17  0409   SODIUM mmol/L 138   POTASSIUM mmol/L 3.4*   CHLORIDE mmol/L 104   TOTAL CO2 mmol/L 30.0   BUN mg/dL 10   CREATININE mg/dL 0.70   GLUCOSE mg/dL 88   CALCIUM mg/dL 9.6       0  Lab Value Date/Time   LIPASE 33 02/14/2017 1108         Assessment/ Plan: s/p lap. Cholecystectomy with post-op bleeding and Dx laparoscopy with minimal intraperitoneal blood  Subhepatic hematoma noted on CT scan  Flank bruising noted  H/H stable  Erythema at umbilical port site and a rash.  Zosyn stopped. Will start on Vanocomycin ans Azactam   Home  once erythema resolves.    Problem List Items Addressed " This Visit     Right ureteral stone - Primary    Right upper quadrant abdominal pain    * (Principal)Cholecystitis      Other Visit Diagnoses     Gallstones        Relevant Orders    Tissue Exam (Completed)            Ryan Corona MD  2/20/2017  8:07 AM

## 2017-02-20 NOTE — PROGRESS NOTES
Pharmacy Consult--Antimicrobial Dosing    Pt is a 60 yo M admitted for cholecystitis.  Complicated with formation of hematoma, which was laparoscopically evacuated on 2/18.  New presence of rash this AM, and was previously on Zosyn.  Pharmacy consulted to manage vancomycin as part of new regimen for possible cellulitis.    Indication:  SSTI  Consulting Provider:  Dr. Corona (General Surgery)    Current Antimicrobial Therapy  1.  Vancomycin PTD (Day 1)  2.  Azactam 2g IV q8h (Day 1)    Previous Antimicrobials Received:  Zosyn    Review of patient's allergies indicates no known allergies.    Labs    Results from last 7 days     Lab Units 02/20/17  0409 02/18/17  0412 02/17/17  0427 02/16/17  0437 02/15/17  0439   SODIUM mmol/L 138 140 138 138 136   POTASSIUM mmol/L 3.4* 3.9 3.7 4.7 4.0   CHLORIDE mmol/L 104 108 108 107 105   TOTAL CO2 mmol/L 30.0 27.0 24.0 22.0 29.0   BUN mg/dL 10 11 21 20 14   CREATININE mg/dL 0.70 0.90 1.50* 1.90* 1.00   CALCIUM mg/dL 9.6 8.8 8.4* 8.6* 9.4   BILIRUBIN mg/dL --  --  0.6 0.9 1.1   ALK PHOS U/L --  --  39 42 52   ALT (SGPT) U/L --  --  36 44* 24   AST (SGOT) U/L --  --  26 39* 20   GLUCOSE mg/dL 88 82 96 170* 96     Results from last 7 days     Lab Units 02/20/17  0409 02/19/17  0413 02/18/17  1641 02/18/17  0412   WBC 10*3/mm3 6.46 6.83 --  6.99   HEMOGLOBIN g/dL 9.1* 8.6* 8.4* 8.1*   HEMATOCRIT % 26.6* 25.7* 24.9* 24.2*   PLATELETS 10*3/mm3 263 226 --  172     Evaluation of Dosing  Weight: 90.7kg  Last Dose Received in the ED/Outside Facility:  None  Goal Trough:  10-15 mcg/mL for SSTI    Estimated Creatinine Clearance: 121.8 mL/min (by C-G formula based on Cr of 0.7).    I/O last 3 completed shifts:  In: 50 [IV Piggyback:50]  Out: 1800 [Urine:1800]    Last 24 Hours:  50/750+ 3 voids    Microbiology and Radiology     WOUND CULTURE   Date Value Ref Range Status   02/19/2017 No growth  Preliminary     Evaluation of Level    Level pending on 2/22 AM    Plan:  1.  Start vancomycin  1g IV q12h.  2.  Will check vancomycin trough on 2/22 at 0830.  Hold dose for trough > 22  3.  Pharmacy will continue to follow and adjust dose based on renal function, drug levels, and clinical status.    Thanks,  Ameya HallD, BCPS  #[id number]  02/20/17  1:35 PM

## 2017-02-21 VITALS
BODY MASS INDEX: 29.62 KG/M2 | SYSTOLIC BLOOD PRESSURE: 150 MMHG | WEIGHT: 200 LBS | TEMPERATURE: 98.7 F | OXYGEN SATURATION: 97 % | RESPIRATION RATE: 18 BRPM | HEART RATE: 79 BPM | HEIGHT: 69 IN | DIASTOLIC BLOOD PRESSURE: 74 MMHG

## 2017-02-21 LAB
ANION GAP SERPL CALCULATED.3IONS-SCNC: 4 MMOL/L (ref 3–11)
BACTERIA SPEC AEROBE CULT: NORMAL
BUN BLD-MCNC: 13 MG/DL (ref 9–23)
BUN/CREAT SERPL: 16.3 (ref 7–25)
CALCIUM SPEC-SCNC: 9.6 MG/DL (ref 8.7–10.4)
CHLORIDE SERPL-SCNC: 103 MMOL/L (ref 99–109)
CO2 SERPL-SCNC: 31 MMOL/L (ref 20–31)
CREAT BLD-MCNC: 0.8 MG/DL (ref 0.6–1.3)
DEPRECATED RDW RBC AUTO: 42.1 FL (ref 37–54)
ERYTHROCYTE [DISTWIDTH] IN BLOOD BY AUTOMATED COUNT: 12.5 % (ref 11.3–14.5)
GFR SERPL CREATININE-BSD FRML MDRD: 98 ML/MIN/1.73
GLUCOSE BLD-MCNC: 93 MG/DL (ref 70–100)
GRAM STN SPEC: NORMAL
GRAM STN SPEC: NORMAL
HCT VFR BLD AUTO: 28 % (ref 38.9–50.9)
HGB BLD-MCNC: 9.4 G/DL (ref 13.1–17.5)
MCH RBC QN AUTO: 30.9 PG (ref 27–31)
MCHC RBC AUTO-ENTMCNC: 33.6 G/DL (ref 32–36)
MCV RBC AUTO: 92.1 FL (ref 80–99)
PLATELET # BLD AUTO: 284 10*3/MM3 (ref 150–450)
PMV BLD AUTO: 8.6 FL (ref 6–12)
POTASSIUM BLD-SCNC: 3.5 MMOL/L (ref 3.5–5.5)
RBC # BLD AUTO: 3.04 10*6/MM3 (ref 4.2–5.76)
SODIUM BLD-SCNC: 138 MMOL/L (ref 132–146)
WBC NRBC COR # BLD: 8.08 10*3/MM3 (ref 3.5–10.8)

## 2017-02-21 PROCEDURE — 85027 COMPLETE CBC AUTOMATED: CPT | Performed by: SURGERY

## 2017-02-21 PROCEDURE — 80048 BASIC METABOLIC PNL TOTAL CA: CPT | Performed by: NURSE PRACTITIONER

## 2017-02-21 PROCEDURE — 99239 HOSP IP/OBS DSCHRG MGMT >30: CPT | Performed by: NURSE PRACTITIONER

## 2017-02-21 PROCEDURE — 25010000002 VANCOMYCIN PER 500 MG

## 2017-02-21 RX ORDER — ACETAMINOPHEN 325 MG/1
650 TABLET ORAL EVERY 4 HOURS PRN
Refills: 0
Start: 2017-02-21

## 2017-02-21 RX ORDER — PSEUDOEPHEDRINE HCL 30 MG
100 TABLET ORAL 2 TIMES DAILY PRN
Qty: 20 CAPSULE | Refills: 0 | Status: SHIPPED | OUTPATIENT
Start: 2017-02-21

## 2017-02-21 RX ORDER — SULFAMETHOXAZOLE AND TRIMETHOPRIM 800; 160 MG/1; MG/1
1 TABLET ORAL 2 TIMES DAILY
Qty: 10 TABLET | Refills: 0 | Status: SHIPPED | OUTPATIENT
Start: 2017-02-21 | End: 2017-02-26

## 2017-02-21 RX ORDER — DIPHENHYDRAMINE HCL 25 MG
25 CAPSULE ORAL EVERY 6 HOURS PRN
Refills: 0
Start: 2017-02-21

## 2017-02-21 RX ORDER — HYDROCODONE BITARTRATE AND ACETAMINOPHEN 5; 325 MG/1; MG/1
2 TABLET ORAL EVERY 4 HOURS PRN
Qty: 18 TABLET | Refills: 0 | Status: SHIPPED | OUTPATIENT
Start: 2017-02-21

## 2017-02-21 RX ORDER — DIAPER,BRIEF,INFANT-TODD,DISP
EACH MISCELLANEOUS EVERY 12 HOURS SCHEDULED
Refills: 0
Start: 2017-02-21

## 2017-02-21 RX ADMIN — HYDROCODONE BITARTRATE AND ACETAMINOPHEN 2 TABLET: 5; 325 TABLET ORAL at 05:33

## 2017-02-21 RX ADMIN — HYDROCORTISONE: 10 CREAM TOPICAL at 08:19

## 2017-02-21 RX ADMIN — AZTREONAM 2 G: 2 INJECTION, POWDER, LYOPHILIZED, FOR SOLUTION INTRAMUSCULAR; INTRAVENOUS at 08:17

## 2017-02-21 RX ADMIN — VANCOMYCIN HYDROCHLORIDE 1000 MG: 1 INJECTION, SOLUTION INTRAVENOUS at 08:18

## 2017-02-21 RX ADMIN — HYDROCODONE BITARTRATE AND ACETAMINOPHEN 2 TABLET: 5; 325 TABLET ORAL at 11:49

## 2017-02-21 RX ADMIN — PANTOPRAZOLE SODIUM 40 MG: 40 TABLET, DELAYED RELEASE ORAL at 05:33

## 2017-02-21 RX ADMIN — HYDROCHLOROTHIAZIDE 12.5 MG: 12.5 TABLET ORAL at 08:18

## 2017-02-21 RX ADMIN — AZTREONAM 2 G: 2 INJECTION, POWDER, LYOPHILIZED, FOR SOLUTION INTRAMUSCULAR; INTRAVENOUS at 01:03

## 2017-02-21 RX ADMIN — LISINOPRIL 10 MG: 10 TABLET ORAL at 08:18

## 2017-02-21 NOTE — PAYOR COMM NOTE
"Cynthia Barrera (61 y.o. Male) Notification of discharge today on 02/21/17.  Thank you, Zhane GUERIN    Date of Birth Social Security Number Address Home Phone MRN    1955  [address] [place] [address] [address] [place] [place] [address] [phone] [id number]    Rastafarian Marital Status          None Single       Admission Date Admission Type Admitting Provider Attending Provider Department, Room/Bed    2/14/17 Emergency Jennifer Wolfe II, [facility] 2F, S211/1    Discharge Date Discharge Disposition Discharge Destination        2/21/2017 Home or Self Care             Attending Provider: (none)    Allergies:  Zosyn [Piperacillin Sod-tazobactam So]    Isolation:  None   Infection:  None   Code Status:  FULL    Ht:  69\" (175.3 cm)   Wt:  200 lb (90.7 kg)    Admission Cmt:  None   Principal Problem:  Cholecystitis [K81.9]                 Active Insurance as of 2/14/2017     Primary Coverage     Payor Plan Insurance Group Employer/Plan Group    [employer] [place] [employer] [place]      Payor Plan Address Payor Plan Phone Number Effective From Effective To    [address] [address] [phone] 2/14/2017     [place] [address]       Subscriber Name Subscriber Birth Date Member ID       CYNTHIA BARRERA JERICHO 1955 [id number]                 Emergency Contacts      (Rel.) Home Phone Work Phone Mobile Phone    Elaine Johnson (Sister) [phone] -- --    Paul Johnson -- -- [phone]            Discharge Order     Start     Ordered    02/21/17 1040  Discharge patient  Once     Expected Discharge Date:  02/21/17    Discharge Disposition:  Home or Self Care        02/21/17 1041          "

## 2017-02-21 NOTE — DISCHARGE SUMMARY
[facility] Medicine Services  DISCHARGE SUMMARY       Date of Admission: 2/14/2017  Date of Discharge:  2/21/2017  Primary Care Physician: No Known Provider    Discharge Diagnoses:  Active Hospital Problems (** Indicates Principal Problem)    Diagnosis Date Noted   • **Cholecystitis [K81.9] 02/14/2017   • Hematoma/Post Op bleeding [T14.8] 02/17/2017   • Anemia, ABL from surgery + dilutional [D64.9] 02/17/2017   • Hypotension [I95.9] 02/16/2017   • Right ureteral stone [N20.1] 02/14/2017   • Right upper quadrant abdominal pain [R10.11] 02/14/2017   • HTN (hypertension) [I10] 02/14/2017   • Gall stones [K80.20] 02/14/2017      Resolved Hospital Problems    Diagnosis Date Noted Date Resolved   No resolved problems to display.       Presenting Problem/History of Present Illness  Right ureteral stone [N20.1]    Chief Complaint on Day of Discharge: None today, f/u lap ccy    History of Present Illness on Day of Discharge:  Seen sitting upright on side of bed in no acute distress.  Reports he is feeling much better today.  Redness to umbilical incision site almost resolved.  Reports his rash is almost resolved and non-itching now.  Tolerating diet and ambulating independently.  No new issues and feels ready for discharge home today.    Hospital Course  Patient is a 61 y.o. male past medical history significant for hypertension, kidney stones, Ménière's disease, and DVT of the left leg greater than 10 years ago.  She presents to [facility] ER with chief complaint of right-sided abdominal pain that awoke him from sleep on morning of presentation.  Associated bloating, pressure, nausea and epigastric pain.  Persisted and he presented to [facility] ER.  T the abdomen and ER revealed nonobstructing nephroliths a nonobstructing stone in the distal portion of the right ureter, and a 9 mm stone lodged in the gallbladder neck.  Gallbladder ultrasound showed mild thickening of the gallbladder wall.  Patient was  started on IV pain medication and Zosyn and admitted to hospital medicine for further evaluation and management.  She was found to have symptomatic cholecystitis and underwent laparoscopic cholecystectomy and repair of umbilical hernia per Dr. Palacios on 2/15/17.  Operatively his hemoglobin trended down and he returned to surgery on 2/17 for exploratory laparotomy and evacuation of hematoma returning 50 ML's of fluid.  Kurt H&H's were monitored and his hemoglobin remained stable post second surgery.  Patient would have discharged home however on evening of 2/19 developed erythema around his umbilical incision as well as a rash to his back face and upper chest felt to be related to Zosyn.  Abx were changed and rash was treated.  She was observed.  Today his H&H has remained stable and is erythema to his umbilical incision has almost totally resolved.  His rash to face chest and back have significantly improved.  Almost totally resolved to face.  GERD for discharge home today from a surgery standpoint and is medically stable.  He is currently hemodynamically stable and afebrile.  He will discharge home today on a 5 day course of Bactrim per Dr. HARDY and follow-up with surgery in one week as well as PCP.    Procedures Performed  Procedure(s):  LAPRASCOPIC EVACUATION OF ABD HEMATOMA       Consults:   Consults     No orders found from 1/16/2017 to 2/15/2017.          Pertinent Test Results:  Results for CYNTHIA AG (MRN [id number]) as of 2/21/2017 10:26   Ref. Range 2/21/2017 04:32   Glucose Latest Ref Range: 70 - 100 mg/dL 93   Sodium Latest Ref Range: 132 - 146 mmol/L 138   Potassium Latest Ref Range: 3.5 - 5.5 mmol/L 3.5   CO2 Latest Ref Range: 20.0 - 31.0 mmol/L 31.0   Chloride Latest Ref Range: 99 - 109 mmol/L 103   Anion Gap Latest Ref Range: 3.0 - 11.0 mmol/L 4.0   Creatinine Latest Ref Range: 0.60 - 1.30 mg/dL 0.80   BUN Latest Ref Range: 9 - 23 mg/dL 13   BUN/Creatinine Ratio Latest Ref Range: 7.0 - 25.0   "16.3   Calcium Latest Ref Range: 8.7 - 10.4 mg/dL 9.6   eGFR Non African Amer Latest Ref Range: >60 mL/min/1.73 98   WBC Latest Ref Range: 3.50 - 10.80 10*3/mm3 8.08   RBC Latest Ref Range: 4.20 - 5.76 10*6/mm3 3.04 (L)   Hemoglobin Latest Ref Range: 13.1 - 17.5 g/dL 9.4 (L)   Hematocrit Latest Ref Range: 38.9 - 50.9 % 28.0 (L)   RDW Latest Ref Range: 11.3 - 14.5 % 12.5   MCV Latest Ref Range: 80.0 - 99.0 fL 92.1   MCH Latest Ref Range: 27.0 - 31.0 pg 30.9   MCHC Latest Ref Range: 32.0 - 36.0 g/dL 33.6   MPV Latest Ref Range: 6.0 - 12.0 fL 8.6   Platelets Latest Ref Range: 150 - 450 10*3/mm3 284   RDW-SD Latest Ref Range: 37.0 - 54.0 fl 42.1       Imaging Results (last 72 hours)     Procedure Component Value Units Date/Time    CT Abdomen Pelvis Without Contrast [[id number]] Collected:  02/18/17 1550     Updated:  02/19/17 0950    Narrative:       EXAMINATION: CT ABDOMEN AND PELVIS WO CONTRAST - 02/18/2017      INDICATION: N20.1-Calculus of ureter; K81.9-Cholecystitis, unspecified;  R10.11-Right upper quadrant pain; K80.20-Calculus of gallbladder without  cholecystitis without obstruction. Possible retroperitoneal bleed.        Impression:       Intraperitoneal hematoma in the subhepatic space arising  from the gallbladder fossa. The collection measures approximately 13 x  7.8 x 8.6 cm.     DICTATED:     02/18/2017  EDITED:         02/18/2017     This report was finalized on 2/19/2017 9:48 AM by Dr. Ferdinand Barrera MD.               Condition on Discharge:  Stable    Physical Exam on Discharge:  Visit Vitals   • /74   • Pulse 79   • Temp 98.3 °F (36.8 °C) (Oral)   • Resp 18   • Ht 69\" (175.3 cm)   • Wt 200 lb (90.7 kg)   • SpO2 97%   • BMI 29.53 kg/m2     Physical Exam    General Assessment: No acute cardiopulmonary distress. Well developed and well nourished. No visitors at bs.   HEENT: NCAT, PERRL, MM  Neck: Supple, trachea midline  CVS: RRR, S1S2 normal, no murmurs  Resp: CTAB A/P, no adventitious sounds  Abd: " Obese, soft, mild incisional tenderness, mild distended, normal BS, no guarding or peritoneal signs, dressings C/D/I x 4 laps. See skin below   Ext: No BLE edema, both calves are symmetric and NTT  Neuro: Nonfocal, alert and oriented ×4   Skin: W/D. Noted erythematous rash with areas of maculopapular lesions to back and upper chest improving, only mild erythema now and now drainage. Erythema and scaling type rash to face/hair line/ears almost totally resolved. Erythema surrounding umbilical dressing extending irregular shaped but marked per nursing staff now significantly improved, almost resolved. No warmth to touch. Other 3 upper lap sites c/d/i and without warmth or erythema. Ecchymosis right abd/right flank no worsening.   Psych: Affect is appropriate, pleasant, calm, cooperative   Discharge Disposition    Home or Self Care    Discharge Medications   Antonino Barrera   Home Medication Instructions BLANCO:[id number]    Printed on:02/21/17 1049   Medication Information                      acetaminophen (TYLENOL) 325 MG tablet  Take 2 tablets by mouth Every 4 (Four) Hours As Needed for mild pain (1-3). OTC             diphenhydrAMINE (BENADRYL) 25 mg capsule  Take 1 capsule by mouth Every 6 (Six) Hours As Needed for itching. OTC             docusate sodium 100 MG capsule  Take 100 mg by mouth 2 (Two) Times a Day As Needed for constipation.             HYDROcodone-acetaminophen (NORCO) 5-325 MG per tablet  Take 2 tablets by mouth Every 4 (Four) Hours As Needed for moderate pain (4-6).             hydrocortisone 1 % cream  Apply  topically Every 12 (Twelve) Hours. To effected area as needed             lisinopril-hydrochlorothiazide (PRINZIDE,ZESTORETIC) 20-12.5 MG per tablet  Take 1 tablet by mouth Daily. Wrong dosage             omeprazole (priLOSEC) 40 MG capsule  Take 40 mg by mouth Daily.             sucralfate (CARAFATE) 1 G tablet  Take 1 g by mouth 4 (Four) Times a Day.             sulfamethoxazole-trimethoprim  (BACTRIM DS) 800-160 MG per tablet  Take 1 tablet by mouth 2 (Two) Times a Day for 5 days.             valACYclovir (VALTREX) 500 MG tablet  Take 1,000 mg by mouth Daily.                 Discharge Diet:   Diet Instructions     Diet: Regular, Cardiac; Thin Liquids, No Restrictions       Discharge Diet:   Regular  Cardiac      Fluid Consistency:  Thin Liquids, No Restrictions                 Discharge Care Plan / Instructions:    Activity at Discharge:   Activity Instructions     Activity as Tolerated                     Follow-up Appointments  No future appointments.  Additional Instructions for the Follow-ups that You Need to Schedule     Discharge Follow-up with PCP    As directed    Follow Up Details:  1 week       Discharge Follow-up with Specialty    As directed    Follow Up:  1 Week   Follow Up Details:  with Dr HARDY in 1 week                    Keesha Hernandez, APRN 02/21/17 10:42 AM    Time: 40 minutes    Please note that portions of this note may have been completed with a voice recognition program. Efforts were made to edit the dictations, but occasionally words are mistranscribed.

## 2017-02-21 NOTE — PROGRESS NOTES
"Adult Nutrition  Assessment/PES    Patient Name:  Antonino Barrera  YOB: 1955  MRN: [id number]  Admit Date:  2/14/2017    Assessment Date:  2/21/2017        Reason for Assessment       02/21/17 1033    Reason for Assessment    Reason For Assessment/Visit length of stay    Time Spent (min) 20    Cardiac HTN    Other diagnosis Cholecystitis, Right Ureteral Stone, Right Upper Quadrant Abdominal Pain, Gallstones              Nutrition/Diet History       02/21/17 1035    Nutrition/Diet History    Reported/Observed By Patient    Other Pt. stated his appetite is good, no n/v or problems chewing or swallowing.  No unintentional wt. loss            Anthropometrics       02/21/17 1037    Anthropometrics    Height 175.3 cm (69\")    Weight 90.7 kg (200 lb)    Ideal Body Weight (IBW)    Ideal Body Weight (IBW), Male (kg) 73.69    % Ideal Body Weight 123.37    Body Mass Index (BMI)    BMI (kg/m2) 29.6            Labs/Tests/Procedures/Meds       02/21/17 1037    Labs/Tests/Procedures/Meds    Labs/Tests Review Reviewed                Nutrition Prescription Ordered       02/21/17 1037    Nutrition Prescription PO    Current PO Diet Regular    Common Modifiers Cardiac            Evaluation of Received Nutrient/Fluid Intake       02/21/17 1037    PO Evaluation    Number of Meals 2    % PO Intake 100              Problem/Interventions:        Problem 1       02/21/17 1038    Nutrition Diagnoses Problem 1    Problem 1 No Nutrition Diagnosis at this Time                    Intervention Goal       02/21/17 1038    Intervention Goal    General Nutrition support treatment    PO Maintain intake            Nutrition Intervention       02/21/17 1038    Nutrition Intervention    RD/Tech Action Advise alternate selection              Education/Evaluation       02/21/17 1038    Monitor/Evaluation    Monitor Per protocol        Comments:      Electronically signed by:  Whitney Aviles RD  02/21/17 3:29 PM  "

## 2017-02-21 NOTE — PLAN OF CARE
Problem: Pain, Acute (Adult)  Goal: Acceptable Pain Control/Comfort Level  Outcome: Ongoing (interventions implemented as appropriate)    02/21/17 0602   Pain, Acute (Adult)   Acceptable Pain Control/Comfort Level making progress toward outcome

## 2017-02-21 NOTE — PROGRESS NOTES
"Antonino Barrera  1955  [id number]    Surgery Progress Note    Date of visit: 2/21/2017    Subjective:better  Taking PO well  Ambulating        Objective:    Visit Vitals   • /84 (BP Location: Left arm, Patient Position: Lying)   • Pulse 79   • Temp 98.3 °F (36.8 °C) (Oral)   • Resp 18   • Ht 69\" (175.3 cm)   • Wt 200 lb (90.7 kg)   • SpO2 97%   • BMI 29.53 kg/m2       Intake/Output Summary (Last 24 hours) at 02/21/17 0721  Last data filed at 02/21/17 0500   Gross per 24 hour   Intake    700 ml   Output   1225 ml   Net   -525 ml       CV: Regular rate and rhythm  L: normal air entry    Abd: soft. Erythema around umbilicus resolved      LABS:      Results from last 7 days  Lab Units 02/21/17  0432   WBC 10*3/mm3 8.08   HEMOGLOBIN g/dL 9.4*   HEMATOCRIT % 28.0*   PLATELETS 10*3/mm3 284       Results from last 7 days  Lab Units 02/21/17  0432  02/17/17  0427   SODIUM mmol/L 138  < > 138   POTASSIUM mmol/L 3.5  < > 3.7   CHLORIDE mmol/L 103  < > 108   TOTAL CO2 mmol/L 31.0  < > 24.0   BUN mg/dL 13  < > 21   CREATININE mg/dL 0.80  < > 1.50*   CALCIUM mg/dL 9.6  < > 8.4*   BILIRUBIN mg/dL  --   --  0.6   ALK PHOS U/L  --   --  39   ALT (SGPT) U/L  --   --  36   AST (SGOT) U/L  --   --  26   GLUCOSE mg/dL 93  < > 96   < > = values in this interval not displayed.    Results from last 7 days  Lab Units 02/21/17  0432   SODIUM mmol/L 138   POTASSIUM mmol/L 3.5   CHLORIDE mmol/L 103   TOTAL CO2 mmol/L 31.0   BUN mg/dL 13   CREATININE mg/dL 0.80   GLUCOSE mg/dL 93   CALCIUM mg/dL 9.6       0  Lab Value Date/Time   LIPASE 33 02/14/2017 1108         Assessment/ Plan: stable course  H/H stable  Erythema resolved  Ready to go home  Bactrim DS for 5 days  F/U in one week    Problem List Items Addressed This Visit     Right ureteral stone - Primary    Right upper quadrant abdominal pain    * (Principal)Cholecystitis      Other Visit Diagnoses     Gallstones        Relevant Orders    Tissue Exam (Completed)            Ryan EDWARDS" MD Chloe  2/21/2017  7:21 AM

## 2023-11-30 ENCOUNTER — APPOINTMENT (OUTPATIENT)
Dept: CT IMAGING | Facility: HOSPITAL | Age: 68
DRG: 872 | End: 2023-11-30
Payer: MEDICARE

## 2023-11-30 ENCOUNTER — HOSPITAL ENCOUNTER (EMERGENCY)
Facility: HOSPITAL | Age: 68
Discharge: HOME | DRG: 872 | End: 2023-11-30
Attending: EMERGENCY MEDICINE
Payer: MEDICARE

## 2023-11-30 VITALS
OXYGEN SATURATION: 95 % | RESPIRATION RATE: 20 BRPM | SYSTOLIC BLOOD PRESSURE: 101 MMHG | HEIGHT: 69 IN | HEART RATE: 97 BPM | BODY MASS INDEX: 27.99 KG/M2 | TEMPERATURE: 99 F | DIASTOLIC BLOOD PRESSURE: 75 MMHG | WEIGHT: 189 LBS

## 2023-11-30 DIAGNOSIS — N12 PYELONEPHRITIS: Primary | ICD-10-CM

## 2023-11-30 LAB
ALBUMIN SERPL-MCNC: 3.8 G/DL (ref 3.5–5.2)
ALBUMIN/GLOB SERPL: 1.4 G/DL
ALP SERPL-CCNC: 92 U/L (ref 39–117)
ALT SERPL W P-5'-P-CCNC: 19 U/L (ref 1–41)
ANION GAP SERPL CALCULATED.3IONS-SCNC: 13 MMOL/L (ref 5–15)
AST SERPL-CCNC: 24 U/L (ref 1–40)
BACTERIA UR QL AUTO: ABNORMAL /HPF
BASOPHILS # BLD AUTO: 0.06 10*3/MM3 (ref 0–0.2)
BASOPHILS NFR BLD AUTO: 0.3 % (ref 0–1.5)
BILIRUB SERPL-MCNC: 2.1 MG/DL (ref 0–1.2)
BILIRUB UR QL STRIP: NEGATIVE
BUN SERPL-MCNC: 18 MG/DL (ref 8–23)
BUN/CREAT SERPL: 13.1 (ref 7–25)
CALCIUM SPEC-SCNC: 9.2 MG/DL (ref 8.6–10.5)
CHLORIDE SERPL-SCNC: 97 MMOL/L (ref 98–107)
CLARITY UR: ABNORMAL
CO2 SERPL-SCNC: 22 MMOL/L (ref 22–29)
COLOR UR: YELLOW
CREAT SERPL-MCNC: 1.37 MG/DL (ref 0.76–1.27)
D-LACTATE SERPL-SCNC: 1.6 MMOL/L (ref 0.5–2)
DEPRECATED RDW RBC AUTO: 41.3 FL (ref 37–54)
EGFRCR SERPLBLD CKD-EPI 2021: 55.8 ML/MIN/1.73
EOSINOPHIL # BLD AUTO: 0.01 10*3/MM3 (ref 0–0.4)
EOSINOPHIL NFR BLD AUTO: 0.1 % (ref 0.3–6.2)
ERYTHROCYTE [DISTWIDTH] IN BLOOD BY AUTOMATED COUNT: 12.8 % (ref 12.3–15.4)
GLOBULIN UR ELPH-MCNC: 2.8 GM/DL
GLUCOSE SERPL-MCNC: 144 MG/DL (ref 65–99)
GLUCOSE UR STRIP-MCNC: NEGATIVE MG/DL
HCT VFR BLD AUTO: 42.8 % (ref 37.5–51)
HGB BLD-MCNC: 14.5 G/DL (ref 13–17.7)
HGB UR QL STRIP.AUTO: ABNORMAL
HOLD SPECIMEN: NORMAL
HYALINE CASTS UR QL AUTO: ABNORMAL /LPF
IMM GRANULOCYTES # BLD AUTO: 0.23 10*3/MM3 (ref 0–0.05)
IMM GRANULOCYTES NFR BLD AUTO: 1.2 % (ref 0–0.5)
KETONES UR QL STRIP: ABNORMAL
LEUKOCYTE ESTERASE UR QL STRIP.AUTO: ABNORMAL
LIPASE SERPL-CCNC: 13 U/L (ref 13–60)
LYMPHOCYTES # BLD AUTO: 0.64 10*3/MM3 (ref 0.7–3.1)
LYMPHOCYTES NFR BLD AUTO: 3.3 % (ref 19.6–45.3)
MCH RBC QN AUTO: 29.9 PG (ref 26.6–33)
MCHC RBC AUTO-ENTMCNC: 33.9 G/DL (ref 31.5–35.7)
MCV RBC AUTO: 88.2 FL (ref 79–97)
MONOCYTES # BLD AUTO: 1.84 10*3/MM3 (ref 0.1–0.9)
MONOCYTES NFR BLD AUTO: 9.5 % (ref 5–12)
NEUTROPHILS NFR BLD AUTO: 16.55 10*3/MM3 (ref 1.7–7)
NEUTROPHILS NFR BLD AUTO: 85.6 % (ref 42.7–76)
NITRITE UR QL STRIP: NEGATIVE
NRBC BLD AUTO-RTO: 0 /100 WBC (ref 0–0.2)
PH UR STRIP.AUTO: 6.5 [PH] (ref 5–8)
PLATELET # BLD AUTO: 194 10*3/MM3 (ref 140–450)
PMV BLD AUTO: 10.1 FL (ref 6–12)
POTASSIUM SERPL-SCNC: 3.3 MMOL/L (ref 3.5–5.2)
PROT SERPL-MCNC: 6.6 G/DL (ref 6–8.5)
PROT UR QL STRIP: ABNORMAL
RBC # BLD AUTO: 4.85 10*6/MM3 (ref 4.14–5.8)
RBC # UR STRIP: ABNORMAL /HPF
REF LAB TEST METHOD: ABNORMAL
SODIUM SERPL-SCNC: 132 MMOL/L (ref 136–145)
SP GR UR STRIP: 1.01 (ref 1–1.03)
SQUAMOUS #/AREA URNS HPF: ABNORMAL /HPF
UROBILINOGEN UR QL STRIP: ABNORMAL
WBC # UR STRIP: ABNORMAL /HPF
WBC NRBC COR # BLD AUTO: 19.33 10*3/MM3 (ref 3.4–10.8)
WHOLE BLOOD HOLD COAG: NORMAL
WHOLE BLOOD HOLD SPECIMEN: NORMAL

## 2023-11-30 PROCEDURE — 87077 CULTURE AEROBIC IDENTIFY: CPT

## 2023-11-30 PROCEDURE — 85025 COMPLETE CBC W/AUTO DIFF WBC: CPT

## 2023-11-30 PROCEDURE — 96365 THER/PROPH/DIAG IV INF INIT: CPT

## 2023-11-30 PROCEDURE — 87040 BLOOD CULTURE FOR BACTERIA: CPT

## 2023-11-30 PROCEDURE — 80053 COMPREHEN METABOLIC PANEL: CPT

## 2023-11-30 PROCEDURE — 25010000002 CEFTRIAXONE PER 250 MG: Performed by: PHYSICIAN ASSISTANT

## 2023-11-30 PROCEDURE — 83605 ASSAY OF LACTIC ACID: CPT

## 2023-11-30 PROCEDURE — 99285 EMERGENCY DEPT VISIT HI MDM: CPT

## 2023-11-30 PROCEDURE — 36415 COLL VENOUS BLD VENIPUNCTURE: CPT

## 2023-11-30 PROCEDURE — 25510000001 IOPAMIDOL 61 % SOLUTION: Performed by: EMERGENCY MEDICINE

## 2023-11-30 PROCEDURE — 83690 ASSAY OF LIPASE: CPT

## 2023-11-30 PROCEDURE — 87086 URINE CULTURE/COLONY COUNT: CPT | Performed by: EMERGENCY MEDICINE

## 2023-11-30 PROCEDURE — 74177 CT ABD & PELVIS W/CONTRAST: CPT

## 2023-11-30 PROCEDURE — 25810000003 SODIUM CHLORIDE 0.9 % SOLUTION: Performed by: PHYSICIAN ASSISTANT

## 2023-11-30 PROCEDURE — 87150 DNA/RNA AMPLIFIED PROBE: CPT

## 2023-11-30 PROCEDURE — 81001 URINALYSIS AUTO W/SCOPE: CPT

## 2023-11-30 PROCEDURE — 87186 SC STD MICRODIL/AGAR DIL: CPT

## 2023-11-30 RX ORDER — SODIUM CHLORIDE 9 MG/ML
10 INJECTION INTRAVENOUS AS NEEDED
Status: DISCONTINUED | OUTPATIENT
Start: 2023-11-30 | End: 2023-11-30 | Stop reason: HOSPADM

## 2023-11-30 RX ORDER — LEVOFLOXACIN 500 MG/1
500 TABLET, FILM COATED ORAL DAILY
Qty: 10 TABLET | Refills: 0 | Status: SHIPPED | OUTPATIENT
Start: 2023-12-01 | End: 2023-11-30 | Stop reason: SDUPTHER

## 2023-11-30 RX ORDER — ACETAMINOPHEN 325 MG/1
650 TABLET ORAL ONCE
Status: COMPLETED | OUTPATIENT
Start: 2023-11-30 | End: 2023-11-30

## 2023-11-30 RX ORDER — LEVOFLOXACIN 500 MG/1
500 TABLET, FILM COATED ORAL DAILY
Qty: 10 TABLET | Refills: 0 | Status: SHIPPED | OUTPATIENT
Start: 2023-12-01 | End: 2023-12-04 | Stop reason: HOSPADM

## 2023-11-30 RX ORDER — ONDANSETRON 4 MG/1
4 TABLET, FILM COATED ORAL EVERY 6 HOURS PRN
Qty: 15 TABLET | Refills: 0 | Status: SHIPPED | OUTPATIENT
Start: 2023-11-30

## 2023-11-30 RX ADMIN — IOPAMIDOL 95 ML: 612 INJECTION, SOLUTION INTRAVENOUS at 17:17

## 2023-11-30 RX ADMIN — SODIUM CHLORIDE 1000 ML: 9 INJECTION, SOLUTION INTRAVENOUS at 15:14

## 2023-11-30 RX ADMIN — SODIUM CHLORIDE 1000 MG: 900 INJECTION INTRAVENOUS at 15:13

## 2023-11-30 RX ADMIN — ACETAMINOPHEN 650 MG: 325 TABLET ORAL at 17:31

## 2023-11-30 NOTE — ED PROVIDER NOTES
Subjective  History of Present Illness:    Chief Complaint: Flank pain right side, fever chills  History of Present Illness: 68-year-old male presents with flank pain, fever chills, symptoms intermittent for 1 week, pain is on the right side, body aches fevers and chills, decreased appetite.  Previous history of kidney stones and urinary tract infection and kidney infection he reports.  Onset: Gradual onset  Duration: Intermittent for 1 week  Exacerbating / Alleviating factors: Fevers chills body aches  Associated symptoms: Chills and body aches      Nurses Notes reviewed and agree, including vitals, allergies, social history and prior medical history.     REVIEW OF SYSTEMS: All systems reviewed and not pertinent unless noted.    Review of Systems   Constitutional:  Positive for chills and fever.   Gastrointestinal:  Positive for abdominal pain.   All other systems reviewed and are negative.      Past Medical History:   Diagnosis Date    DVT of leg (deep venous thrombosis)     over 10 years ago after trauma to left leg     Hypertension     Kidney stone     Meniere disease        Allergies:    Patient has no active allergies.      Past Surgical History:   Procedure Laterality Date    CHOLECYSTECTOMY N/A 2/15/2017    Procedure: CHOLECYSTECTOMY LAPAROSCOPIC ,UMBILICAL HERNIA REPAIR ;  Surgeon: Ryan Corona MD;  [facility];  Service:     CHOLECYSTECTOMY N/A 2/17/2017    Procedure: LAPRASCOPIC EVACUATION OF ABD HEMATOMA;  Surgeon: Ryan Corona MD;  [facility];  Service:     KNEE SURGERY           Social History     Socioeconomic History    Marital status: Single   Tobacco Use    Smoking status: Former   Vaping Use    Vaping Use: Never used   Substance and Sexual Activity    Alcohol use: Yes     Comment: occasional / on weekends     Drug use: No         No family history on file.    Objective  Physical Exam:  /75   Pulse 97   Temp 99 °F (37.2 °C) (Oral)   Resp 20   Ht 175.3 cm  "(69\")   Wt 85.7 kg (189 lb)   SpO2 95%   BMI 27.91 kg/m²      Physical Exam  Vitals and nursing note reviewed.   Constitutional:       Appearance: He is well-developed.   HENT:      Head: Normocephalic and atraumatic.      Mouth/Throat:      Mouth: Mucous membranes are moist.   Eyes:      Extraocular Movements: Extraocular movements intact.   Cardiovascular:      Rate and Rhythm: Normal rate and regular rhythm.   Pulmonary:      Effort: Pulmonary effort is normal.      Breath sounds: Normal breath sounds.   Abdominal:      Palpations: Abdomen is soft.      Tenderness: There is right CVA tenderness.   Musculoskeletal:         General: Normal range of motion.      Cervical back: Normal range of motion.   Skin:     General: Skin is warm and dry.   Neurological:      Mental Status: He is alert and oriented to person, place, and time.   Psychiatric:         Behavior: Behavior normal.         Thought Content: Thought content normal.         Judgment: Judgment normal.           Procedures    ED Course:    ED Course as of 12/02/23 0726   Thu Nov 30, 2023 1942 On reevaluation, the patient's blood pressure is normal, heart rate is decreased, temp is 99, vital signs stable.  The patient feels better after treatment, and feels comfortable going home, I discussed signs and symptoms to look out for including intractable nausea vomiting, intractable pain, intractable fever. [CS]      ED Course User Index  [CS] Pablo Reynolds Jr., JENNIFER       Lab Results (last 24 hours)       Procedure Component Value Units Date/Time    Blood Culture - Blood, Arm, Right [[id number]]  (Abnormal) Collected: 12/01/23 1145    Specimen: Blood from Arm, Right Updated: 12/02/23 0712     Blood Culture Abnormal Stain     Gram Stain Aerobic Bottle Gram negative bacilli    Blood Culture - Blood, Hand, Right [[id number]] Collected: 12/01/23 1150    Specimen: Blood from Hand, Right Updated: 12/01/23 1208    CBC & Differential [[id number]]  (Abnormal) " Collected: 12/01/23 1152    Specimen: Blood Updated: 12/01/23 1208    Narrative:      The following orders were created for panel order CBC & Differential.  Procedure                               Abnormality         Status                     ---------                               -----------         ------                     CBC Auto Differential[[id number]]        Abnormal            Final result                 Please view results for these tests on the individual orders.    Comprehensive Metabolic Panel [[id number]]  (Abnormal) Collected: 12/01/23 1152    Specimen: Blood Updated: 12/01/23 1231     Glucose 116 mg/dL      BUN 20 mg/dL      Creatinine 1.38 mg/dL      Sodium 126 mmol/L      Potassium 3.0 mmol/L      Chloride 94 mmol/L      CO2 21.0 mmol/L      Calcium 8.8 mg/dL      Total Protein 5.8 g/dL      Albumin 3.2 g/dL      ALT (SGPT) 15 U/L      AST (SGOT) 17 U/L      Alkaline Phosphatase 76 U/L      Total Bilirubin 1.5 mg/dL      Globulin 2.6 gm/dL      Comment: Calculated Result        A/G Ratio 1.2 g/dL      BUN/Creatinine Ratio 14.5     Anion Gap 11.0 mmol/L      eGFR 55.4 mL/min/1.73     Narrative:      GFR Normal >60  Chronic Kidney Disease <60  Kidney Failure <15      Single High Sensitivity Troponin T [[id number]]  (Normal) Collected: 12/01/23 1152    Specimen: Blood Updated: 12/01/23 1229     HS Troponin T 17 ng/L     Narrative:      High Sensitive Troponin T Reference Range:  <14.0 ng/L- Negative Female for AMI  <22.0 ng/L- Negative Male for AMI  >=14 - Abnormal Female indicating possible myocardial injury.  >=22 - Abnormal Male indicating possible myocardial injury.   Clinicians would have to utilize clinical acumen, EKG, Troponin, and serial changes to determine if it is an Acute Myocardial Infarction or myocardial injury due to an underlying chronic condition.         Lactic Acid, Plasma [[id number]]  (Normal) Collected: 12/01/23 1152    Specimen: Blood Updated: 12/01/23 1224     Lactate 1.2  "mmol/L      Comment: Falsely depressed results may occur on samples drawn from patients receiving N-Acetylcysteine (NAC) or Metamizole.       Procalcitonin [[id number]]  (Abnormal) Collected: 12/01/23 1152    Specimen: Blood Updated: 12/01/23 1235     Procalcitonin 10.41 ng/mL     Narrative:      As a Marker for Sepsis (Non-Neonates):    1. <0.5 ng/mL represents a low risk of severe sepsis and/or septic shock.  2. >2 ng/mL represents a high risk of severe sepsis and/or septic shock.    As a Marker for Lower Respiratory Tract Infections that require antibiotic therapy:    PCT on Admission    Antibiotic Therapy       6-12 Hrs later    >0.5                Strongly Recommended  >0.25 - <0.5        Recommended   0.1 - 0.25          Discouraged              Remeasure/reassess PCT  <0.1                Strongly Discouraged     Remeasure/reassess PCT    As 28 day mortality risk marker: \"Change in Procalcitonin Result\" (>80% or <=80%) if Day 0 (or Day 1) and Day 4 values are available. Refer to http://www.[facility]-pct-calculator.com    Change in PCT <=80%  A decrease of PCT levels below or equal to 80% defines a positive change in PCT test result representing a higher risk for 28-day all-cause mortality of patients diagnosed with severe sepsis for septic shock.    Change in PCT >80%  A decrease of PCT levels of more than 80% defines a negative change in PCT result representing a lower risk for 28-day all-cause mortality of patients diagnosed with severe sepsis or septic shock.       CBC Auto Differential [[id number]]  (Abnormal) Collected: 12/01/23 1152    Specimen: Blood Updated: 12/01/23 1208     WBC 15.87 10*3/mm3      RBC 4.12 10*6/mm3      Hemoglobin 12.8 g/dL      Hematocrit 36.6 %      MCV 88.8 fL      MCH 31.1 pg      MCHC 35.0 g/dL      RDW 13.1 %      RDW-SD 42.5 fl      MPV 10.0 fL      Platelets 153 10*3/mm3      Neutrophil % 84.7 %      Lymphocyte % 3.7 %      Monocyte % 9.6 %      Eosinophil % 0.1 %      Basophil % " 0.2 %      Immature Grans % 1.7 %      Neutrophils, Absolute 13.44 10*3/mm3      Lymphocytes, Absolute 0.59 10*3/mm3      Monocytes, Absolute 1.53 10*3/mm3      Eosinophils, Absolute 0.01 10*3/mm3      Basophils, Absolute 0.03 10*3/mm3      Immature Grans, Absolute 0.27 10*3/mm3      nRBC 0.0 /100 WBC     Urinalysis With Culture If Indicated - Urine, Clean Catch [[id number]]  (Abnormal) Collected: 12/01/23 1329    Specimen: Urine, Clean Catch Updated: 12/01/23 1405     Color, UA Yellow     Appearance, UA Clear     pH, UA 6.5     Specific Gravity, UA 1.019     Glucose, UA Negative     Ketones, UA Trace     Bilirubin, UA Negative     Blood, UA Small (1+)     Protein,  mg/dL (2+)     Leuk Esterase, UA Small (1+)     Nitrite, UA Negative     Urobilinogen, UA 1.0 E.U./dL    Narrative:      In absence of clinical symptoms, the presence of pyuria, bacteria, and/or nitrites on the urinalysis result does not correlate with infection.    Urinalysis, Microscopic Only - Urine, Clean Catch [[id number]]  (Abnormal) Collected: 12/01/23 1329    Specimen: Urine, Clean Catch Updated: 12/01/23 1405     RBC, UA 21-50 /HPF      WBC, UA 21-50 /HPF      Bacteria, UA None Seen /HPF      Squamous Epithelial Cells, UA 3-6 /HPF      Renal Epithelial Cells, UA 0-2 /HPF      Hyaline Casts, UA 0-6 /LPF      Methodology Manual Light Microscopy    Urine Culture - Urine, Urine, Clean Catch [[id number]] Collected: 12/01/23 1329    Specimen: Urine, Clean Catch Updated: 12/01/23 1405    POC Glucose Once [[id number]]  (Normal) Collected: 12/01/23 1544    Specimen: Blood Updated: 12/01/23 1547     Glucose 108 mg/dL     POC Glucose Once [[id number]]  (Normal) Collected: 12/01/23 1929    Specimen: Blood Updated: 12/01/23 1930     Glucose 93 mg/dL     Comprehensive Metabolic Panel [[id number]]  (Abnormal) Collected: 12/02/23 0349    Specimen: Blood Updated: 12/02/23 0645     Glucose 81 mg/dL      BUN 19 mg/dL      Creatinine 1.29 mg/dL      Sodium 131  mmol/L      Potassium 4.2 mmol/L      Chloride 99 mmol/L      CO2 22.0 mmol/L      Calcium 9.3 mg/dL      Total Protein 5.6 g/dL      Albumin 3.1 g/dL      ALT (SGPT) 11 U/L      AST (SGOT) 17 U/L      Alkaline Phosphatase 77 U/L      Total Bilirubin 1.4 mg/dL      Globulin 2.5 gm/dL      Comment: Calculated Result        A/G Ratio 1.2 g/dL      BUN/Creatinine Ratio 14.7     Anion Gap 10.0 mmol/L      eGFR 60.0 mL/min/1.73     Narrative:      GFR Normal >60  Chronic Kidney Disease <60  Kidney Failure <15      CBC Auto Differential [[id number]]  (Abnormal) Collected: 12/02/23 0349    Specimen: Blood Updated: 12/02/23 0515     WBC 11.59 10*3/mm3      RBC 3.97 10*6/mm3      Hemoglobin 12.1 g/dL      Hematocrit 35.4 %      MCV 89.2 fL      MCH 30.5 pg      MCHC 34.2 g/dL      RDW 13.2 %      RDW-SD 43.5 fl      MPV 10.5 fL      Platelets 164 10*3/mm3      Neutrophil % 79.5 %      Lymphocyte % 6.0 %      Monocyte % 11.7 %      Eosinophil % 1.6 %      Basophil % 0.3 %      Immature Grans % 0.9 %      Neutrophils, Absolute 9.20 10*3/mm3      Lymphocytes, Absolute 0.69 10*3/mm3      Monocytes, Absolute 1.36 10*3/mm3      Eosinophils, Absolute 0.19 10*3/mm3      Basophils, Absolute 0.04 10*3/mm3      Immature Grans, Absolute 0.11 10*3/mm3      nRBC 0.0 /100 WBC     POC Glucose Once [[id number]]  (Normal) Collected: 12/02/23 0709    Specimen: Blood Updated: 12/02/23 0711     Glucose 79 mg/dL              XR Chest 1 View    Result Date: 12/1/2023  XR CHEST 1 VW Date of Exam: 12/1/2023 11:11 AM EST Indication: urosepsis Comparison: None available. Findings: Cardiomediastinal silhouette is borderline prominent, as imaged on this portable radiograph. Low lung volumes with hypoventilatory change including bibasilar atelectasis. No pleural effusion or pneumothorax. Osseous structures are unremarkable.     Impression: Impression: Low lung volumes with hypoventilatory change including bibasilar atelectasis. No focal consolidation.  Electronically Signed: Jadiel Teran MD  12/1/2023 11:38 AM EST  Workstation ID: [id number]    CT Abdomen Pelvis With Contrast    Result Date: 11/30/2023  CT ABDOMEN PELVIS W CONTRAST Date of Exam: 11/30/2023 3:44 PM CST Indication: right flank pain fever , chills. Comparison: 2/18/2017 Technique: Axial CT images were obtained of the abdomen and pelvis following the uneventful intravenous administration of 95 cc Isovue-300. Reconstructed coronal and sagittal images were also obtained. Automated exposure control and iterative construction methods were used. Findings: LUNG BASES:  Unremarkable without mass or infiltrate. LIVER:  Unremarkable parenchyma without focal lesion. BILIARY/GALLBLADDER: Cholecystectomy SPLEEN:  Unremarkable PANCREAS:  Unremarkable ADRENAL:  Unremarkable KIDNEYS: Heterogeneous cortical enhancement involving the left kidney with perinephric and periureteral stranding. No evidence of obstruction. Imaging features suggestive of pyelonephritis. Right kidney is unremarkable in appearance. No calculus identified. GASTROINTESTINAL/MESENTERY:  No evidence of obstruction nor inflammation.  The appendix is normal. MESENTERIC VESSELS:  Patent. AORTA/IVC:  Normal caliber. RETROPERITONEUM/LYMPH NODES:  Unremarkable REPRODUCTIVE:  Unremarkable BLADDER:  Unremarkable OSSEUS STRUCTURES: Interval likely chronic T11 compression fracture.     Impression: Impression: 1.Imaging features are suggestive of left-sided pyelonephritis without evidence of obstruction. Correlate with symptoms and lab values. 2.Interval likely chronic T11 compression fracture. Electronically Signed: Meng Browne MD  11/30/2023 4:23 PM CST  Workstation ID: [id number]        Medical Decision Making  Patient Presentation 68-year-old male presented to the emergency department with intermittent fever chills he reports that the pain in his back is on the right side, he presented tachycardic with fever on my assessment, he complained of back pain  right greater than left    DDX urinary tract infection, kidney stone, pyelonephritis, colitis, enteritis    Data Review/ Non ED Records /Analysis/Ordering unique tests. Review of previous visits, prior labs, prior imaging, available notes from prior evaluations or visits with specialists, medication list, allergies, past medical history, past surgical history CBC, CMP, lactic, urinalysis were performed        Independent Review Studies interpret all labs and discussed with the patient and family at the bedside    Intervention/Re-evaluation intervention included IV fluids, IV antibiotics, medication for fever, on reassessment, the patient did feel better, his vital signs were stable    Independent Clinician no consultation    Risk Stratification tools/clinical decision rules I discussed the results with the patient and family at the bedside, patient felt much improved, I considered admission for pyelonephritis, at the time of my reevaluation, he did not have any intractable vomiting, his fever was controlled, his blood pressure was normal, and heart rate, I had a long discussion with patient and family, recommending return if he had uncontrollable fever, pain, nausea vomiting.  He was given a dose of Rocephin in the ED, and discharged on Levaquin recommended close follow-up, and patient has strong family support he reports that his sister is a nurse.    Shared Decision Making based on the patient's reevaluation after treatment, and discussion, the patient would like to try to go home and states he will return if he does not feel better, cultures are pending, and I explained if they are positive he would receive a call, and despite current treatment, this could get worse and he may have to return for further evaluation and treatment or hospitalization    Disposition patient stable for discharge    Problems Addressed:  Pyelonephritis: complicated acute illness or injury    Amount and/or Complexity of Data  Reviewed  Radiology: ordered.    Risk  OTC drugs.  Prescription drug management.          Final diagnoses:   Pyelonephritis          Pablo Reynolds Jr., PA-C  12/02/23 0778

## 2023-12-01 ENCOUNTER — TELEPHONE (OUTPATIENT)
Dept: EMERGENCY DEPT | Facility: HOSPITAL | Age: 68
End: 2023-12-01
Payer: MEDICARE

## 2023-12-01 ENCOUNTER — APPOINTMENT (OUTPATIENT)
Dept: GENERAL RADIOLOGY | Facility: HOSPITAL | Age: 68
DRG: 872 | End: 2023-12-01
Payer: MEDICARE

## 2023-12-01 ENCOUNTER — HOSPITAL ENCOUNTER (INPATIENT)
Facility: HOSPITAL | Age: 68
LOS: 3 days | Discharge: HOME | DRG: 872 | End: 2023-12-04
Attending: EMERGENCY MEDICINE | Admitting: HOSPITALIST
Payer: MEDICARE

## 2023-12-01 DIAGNOSIS — R50.9 FEVER IN ADULT: ICD-10-CM

## 2023-12-01 DIAGNOSIS — N12 PYELONEPHRITIS: Primary | ICD-10-CM

## 2023-12-01 DIAGNOSIS — A41.9 SEPSIS, DUE TO UNSPECIFIED ORGANISM, UNSPECIFIED WHETHER ACUTE ORGAN DYSFUNCTION PRESENT: ICD-10-CM

## 2023-12-01 PROBLEM — R78.81 BACTEREMIA: Status: ACTIVE | Noted: 2023-12-01

## 2023-12-01 LAB
ALBUMIN SERPL-MCNC: 3.2 G/DL (ref 3.5–5.2)
ALBUMIN/GLOB SERPL: 1.2 G/DL
ALP SERPL-CCNC: 76 U/L (ref 39–117)
ALT SERPL W P-5'-P-CCNC: 15 U/L (ref 1–41)
ANION GAP SERPL CALCULATED.3IONS-SCNC: 11 MMOL/L (ref 5–15)
AST SERPL-CCNC: 17 U/L (ref 1–40)
BACTERIA BLD CULT: ABNORMAL
BACTERIA ID TEST ISLT QL CULT: ABNORMAL
BACTERIA UR QL AUTO: ABNORMAL /HPF
BASOPHILS # BLD AUTO: 0.03 10*3/MM3 (ref 0–0.2)
BASOPHILS NFR BLD AUTO: 0.2 % (ref 0–1.5)
BILIRUB SERPL-MCNC: 1.5 MG/DL (ref 0–1.2)
BILIRUB UR QL STRIP: NEGATIVE
BOTTLE TYPE: ABNORMAL
BUN SERPL-MCNC: 20 MG/DL (ref 8–23)
BUN/CREAT SERPL: 14.5 (ref 7–25)
CALCIUM SPEC-SCNC: 8.8 MG/DL (ref 8.6–10.5)
CHLORIDE SERPL-SCNC: 94 MMOL/L (ref 98–107)
CLARITY UR: CLEAR
CO2 SERPL-SCNC: 21 MMOL/L (ref 22–29)
COLOR UR: YELLOW
CREAT SERPL-MCNC: 1.38 MG/DL (ref 0.76–1.27)
D-LACTATE SERPL-SCNC: 1.2 MMOL/L (ref 0.5–2)
DEPRECATED RDW RBC AUTO: 42.5 FL (ref 37–54)
EGFRCR SERPLBLD CKD-EPI 2021: 55.4 ML/MIN/1.73
EOSINOPHIL # BLD AUTO: 0.01 10*3/MM3 (ref 0–0.4)
EOSINOPHIL NFR BLD AUTO: 0.1 % (ref 0.3–6.2)
ERYTHROCYTE [DISTWIDTH] IN BLOOD BY AUTOMATED COUNT: 13.1 % (ref 12.3–15.4)
GLOBULIN UR ELPH-MCNC: 2.6 GM/DL
GLUCOSE BLDC GLUCOMTR-MCNC: 108 MG/DL (ref 70–130)
GLUCOSE BLDC GLUCOMTR-MCNC: 93 MG/DL (ref 70–130)
GLUCOSE SERPL-MCNC: 116 MG/DL (ref 65–99)
GLUCOSE UR STRIP-MCNC: NEGATIVE MG/DL
HCT VFR BLD AUTO: 36.6 % (ref 37.5–51)
HGB BLD-MCNC: 12.8 G/DL (ref 13–17.7)
HGB UR QL STRIP.AUTO: ABNORMAL
HYALINE CASTS UR QL AUTO: ABNORMAL /LPF
IMM GRANULOCYTES # BLD AUTO: 0.27 10*3/MM3 (ref 0–0.05)
IMM GRANULOCYTES NFR BLD AUTO: 1.7 % (ref 0–0.5)
KETONES UR QL STRIP: ABNORMAL
LEUKOCYTE ESTERASE UR QL STRIP.AUTO: ABNORMAL
LYMPHOCYTES # BLD AUTO: 0.59 10*3/MM3 (ref 0.7–3.1)
LYMPHOCYTES NFR BLD AUTO: 3.7 % (ref 19.6–45.3)
MCH RBC QN AUTO: 31.1 PG (ref 26.6–33)
MCHC RBC AUTO-ENTMCNC: 35 G/DL (ref 31.5–35.7)
MCV RBC AUTO: 88.8 FL (ref 79–97)
MONOCYTES # BLD AUTO: 1.53 10*3/MM3 (ref 0.1–0.9)
MONOCYTES NFR BLD AUTO: 9.6 % (ref 5–12)
NEUTROPHILS NFR BLD AUTO: 13.44 10*3/MM3 (ref 1.7–7)
NEUTROPHILS NFR BLD AUTO: 84.7 % (ref 42.7–76)
NITRITE UR QL STRIP: NEGATIVE
NRBC BLD AUTO-RTO: 0 /100 WBC (ref 0–0.2)
PH UR STRIP.AUTO: 6.5 [PH] (ref 5–8)
PLATELET # BLD AUTO: 153 10*3/MM3 (ref 140–450)
PMV BLD AUTO: 10 FL (ref 6–12)
POTASSIUM SERPL-SCNC: 3 MMOL/L (ref 3.5–5.2)
PROCALCITONIN SERPL-MCNC: 10.41 NG/ML (ref 0–0.25)
PROT SERPL-MCNC: 5.8 G/DL (ref 6–8.5)
PROT UR QL STRIP: ABNORMAL
RBC # BLD AUTO: 4.12 10*6/MM3 (ref 4.14–5.8)
RBC # UR STRIP: ABNORMAL /HPF
REF LAB TEST METHOD: ABNORMAL
RENAL EPI CELLS #/AREA URNS HPF: ABNORMAL /HPF
SODIUM SERPL-SCNC: 126 MMOL/L (ref 136–145)
SP GR UR STRIP: 1.02 (ref 1–1.03)
SQUAMOUS #/AREA URNS HPF: ABNORMAL /HPF
TROPONIN T SERPL HS-MCNC: 17 NG/L
UROBILINOGEN UR QL STRIP: ABNORMAL
WBC # UR STRIP: ABNORMAL /HPF
WBC NRBC COR # BLD AUTO: 15.87 10*3/MM3 (ref 3.4–10.8)

## 2023-12-01 PROCEDURE — 25810000003 SODIUM CHLORIDE 0.9 % SOLUTION: Performed by: NURSE PRACTITIONER

## 2023-12-01 PROCEDURE — 93005 ELECTROCARDIOGRAM TRACING: CPT | Performed by: NURSE PRACTITIONER

## 2023-12-01 PROCEDURE — 99285 EMERGENCY DEPT VISIT HI MDM: CPT

## 2023-12-01 PROCEDURE — 81001 URINALYSIS AUTO W/SCOPE: CPT | Performed by: NURSE PRACTITIONER

## 2023-12-01 PROCEDURE — 83605 ASSAY OF LACTIC ACID: CPT | Performed by: NURSE PRACTITIONER

## 2023-12-01 PROCEDURE — 71045 X-RAY EXAM CHEST 1 VIEW: CPT

## 2023-12-01 PROCEDURE — 80053 COMPREHEN METABOLIC PANEL: CPT | Performed by: NURSE PRACTITIONER

## 2023-12-01 PROCEDURE — 87086 URINE CULTURE/COLONY COUNT: CPT | Performed by: NURSE PRACTITIONER

## 2023-12-01 PROCEDURE — 25010000002 HEPARIN (PORCINE) PER 1000 UNITS: Performed by: HOSPITALIST

## 2023-12-01 PROCEDURE — 36415 COLL VENOUS BLD VENIPUNCTURE: CPT

## 2023-12-01 PROCEDURE — 25010000002 ERTAPENEM PER 500 MG: Performed by: NURSE PRACTITIONER

## 2023-12-01 PROCEDURE — 87040 BLOOD CULTURE FOR BACTERIA: CPT | Performed by: NURSE PRACTITIONER

## 2023-12-01 PROCEDURE — 99223 1ST HOSP IP/OBS HIGH 75: CPT | Performed by: HOSPITALIST

## 2023-12-01 PROCEDURE — 82948 REAGENT STRIP/BLOOD GLUCOSE: CPT

## 2023-12-01 PROCEDURE — 84145 PROCALCITONIN (PCT): CPT | Performed by: NURSE PRACTITIONER

## 2023-12-01 PROCEDURE — 85025 COMPLETE CBC W/AUTO DIFF WBC: CPT | Performed by: NURSE PRACTITIONER

## 2023-12-01 PROCEDURE — 84484 ASSAY OF TROPONIN QUANT: CPT | Performed by: NURSE PRACTITIONER

## 2023-12-01 RX ORDER — SUCRALFATE 1 G/1
1 TABLET ORAL 4 TIMES DAILY
Status: DISCONTINUED | OUTPATIENT
Start: 2023-12-01 | End: 2023-12-04 | Stop reason: HOSPADM

## 2023-12-01 RX ORDER — CETIRIZINE HYDROCHLORIDE 10 MG/1
10 TABLET ORAL DAILY
Status: DISCONTINUED | OUTPATIENT
Start: 2023-12-01 | End: 2023-12-04 | Stop reason: HOSPADM

## 2023-12-01 RX ORDER — HYDROCHLOROTHIAZIDE 12.5 MG/1
12.5 TABLET ORAL DAILY
COMMUNITY

## 2023-12-01 RX ORDER — SODIUM CHLORIDE 9 MG/ML
40 INJECTION, SOLUTION INTRAVENOUS AS NEEDED
Status: DISCONTINUED | OUTPATIENT
Start: 2023-12-01 | End: 2023-12-04 | Stop reason: HOSPADM

## 2023-12-01 RX ORDER — PROCHLORPERAZINE EDISYLATE 5 MG/ML
5 INJECTION INTRAMUSCULAR; INTRAVENOUS EVERY 6 HOURS PRN
Status: DISCONTINUED | OUTPATIENT
Start: 2023-12-01 | End: 2023-12-04 | Stop reason: HOSPADM

## 2023-12-01 RX ORDER — HEPARIN SODIUM 5000 [USP'U]/ML
5000 INJECTION, SOLUTION INTRAVENOUS; SUBCUTANEOUS EVERY 8 HOURS SCHEDULED
Status: DISCONTINUED | OUTPATIENT
Start: 2023-12-01 | End: 2023-12-04 | Stop reason: HOSPADM

## 2023-12-01 RX ORDER — BISACODYL 5 MG/1
5 TABLET, DELAYED RELEASE ORAL DAILY PRN
Status: DISCONTINUED | OUTPATIENT
Start: 2023-12-01 | End: 2023-12-02

## 2023-12-01 RX ORDER — AMOXICILLIN 250 MG
2 CAPSULE ORAL 2 TIMES DAILY
Status: DISCONTINUED | OUTPATIENT
Start: 2023-12-01 | End: 2023-12-02

## 2023-12-01 RX ORDER — L.ACID,PARA/B.BIFIDUM/S.THERM 8B CELL
1 CAPSULE ORAL DAILY
Status: DISCONTINUED | OUTPATIENT
Start: 2023-12-02 | End: 2023-12-02

## 2023-12-01 RX ORDER — LEVOCETIRIZINE DIHYDROCHLORIDE 5 MG/1
5 TABLET, FILM COATED ORAL EVERY EVENING
COMMUNITY

## 2023-12-01 RX ORDER — ONDANSETRON 2 MG/ML
4 INJECTION INTRAMUSCULAR; INTRAVENOUS EVERY 6 HOURS PRN
Status: DISCONTINUED | OUTPATIENT
Start: 2023-12-01 | End: 2023-12-04 | Stop reason: HOSPADM

## 2023-12-01 RX ORDER — DEXTROSE MONOHYDRATE 25 G/50ML
25 INJECTION, SOLUTION INTRAVENOUS
Status: DISCONTINUED | OUTPATIENT
Start: 2023-12-01 | End: 2023-12-04

## 2023-12-01 RX ORDER — POTASSIUM CHLORIDE 20 MEQ/1
40 TABLET, EXTENDED RELEASE ORAL EVERY 4 HOURS
Qty: 6 TABLET | Refills: 0 | Status: COMPLETED | OUTPATIENT
Start: 2023-12-01 | End: 2023-12-01

## 2023-12-01 RX ORDER — SODIUM CHLORIDE 0.9 % (FLUSH) 0.9 %
10 SYRINGE (ML) INJECTION EVERY 12 HOURS SCHEDULED
Status: DISCONTINUED | OUTPATIENT
Start: 2023-12-01 | End: 2023-12-04 | Stop reason: HOSPADM

## 2023-12-01 RX ORDER — SODIUM CHLORIDE 0.9 % (FLUSH) 0.9 %
10 SYRINGE (ML) INJECTION AS NEEDED
Status: DISCONTINUED | OUTPATIENT
Start: 2023-12-01 | End: 2023-12-04 | Stop reason: HOSPADM

## 2023-12-01 RX ORDER — IBUPROFEN 600 MG/1
1 TABLET ORAL
Status: DISCONTINUED | OUTPATIENT
Start: 2023-12-01 | End: 2023-12-04

## 2023-12-01 RX ORDER — ALLOPURINOL 100 MG/1
100 TABLET ORAL DAILY
Status: DISCONTINUED | OUTPATIENT
Start: 2023-12-01 | End: 2023-12-04 | Stop reason: HOSPADM

## 2023-12-01 RX ORDER — SEMAGLUTIDE 1.34 MG/ML
INJECTION, SOLUTION SUBCUTANEOUS WEEKLY
COMMUNITY

## 2023-12-01 RX ORDER — FAMOTIDINE 20 MG/1
20 TABLET, FILM COATED ORAL 2 TIMES DAILY
Status: DISCONTINUED | OUTPATIENT
Start: 2023-12-01 | End: 2023-12-04 | Stop reason: HOSPADM

## 2023-12-01 RX ORDER — NICOTINE POLACRILEX 4 MG
15 LOZENGE BUCCAL
Status: DISCONTINUED | OUTPATIENT
Start: 2023-12-01 | End: 2023-12-04

## 2023-12-01 RX ORDER — BISACODYL 10 MG
10 SUPPOSITORY, RECTAL RECTAL DAILY PRN
Status: DISCONTINUED | OUTPATIENT
Start: 2023-12-01 | End: 2023-12-02

## 2023-12-01 RX ORDER — COLCHICINE 0.6 MG/1
0.6 TABLET ORAL DAILY
COMMUNITY

## 2023-12-01 RX ORDER — ONDANSETRON 4 MG/1
4 TABLET, FILM COATED ORAL EVERY 6 HOURS PRN
Status: DISCONTINUED | OUTPATIENT
Start: 2023-12-01 | End: 2023-12-04 | Stop reason: HOSPADM

## 2023-12-01 RX ORDER — ALLOPURINOL 100 MG/1
100 TABLET ORAL DAILY
COMMUNITY

## 2023-12-01 RX ORDER — PROCHLORPERAZINE MALEATE 5 MG/1
5 TABLET ORAL EVERY 6 HOURS PRN
Status: DISCONTINUED | OUTPATIENT
Start: 2023-12-01 | End: 2023-12-04 | Stop reason: HOSPADM

## 2023-12-01 RX ORDER — POLYETHYLENE GLYCOL 3350 17 G/17G
17 POWDER, FOR SOLUTION ORAL DAILY PRN
Status: DISCONTINUED | OUTPATIENT
Start: 2023-12-01 | End: 2023-12-02

## 2023-12-01 RX ORDER — PANTOPRAZOLE SODIUM 40 MG/1
40 TABLET, DELAYED RELEASE ORAL
Status: DISCONTINUED | OUTPATIENT
Start: 2023-12-02 | End: 2023-12-04 | Stop reason: HOSPADM

## 2023-12-01 RX ORDER — ACETAMINOPHEN 325 MG/1
650 TABLET ORAL EVERY 6 HOURS PRN
Status: DISCONTINUED | OUTPATIENT
Start: 2023-12-01 | End: 2023-12-04 | Stop reason: HOSPADM

## 2023-12-01 RX ORDER — COLCHICINE 0.6 MG/1
0.6 TABLET ORAL DAILY
Status: DISCONTINUED | OUTPATIENT
Start: 2023-12-02 | End: 2023-12-04 | Stop reason: HOSPADM

## 2023-12-01 RX ORDER — INSULIN LISPRO 100 [IU]/ML
2-7 INJECTION, SOLUTION INTRAVENOUS; SUBCUTANEOUS
Status: DISCONTINUED | OUTPATIENT
Start: 2023-12-01 | End: 2023-12-04

## 2023-12-01 RX ORDER — PROCHLORPERAZINE 25 MG
25 SUPPOSITORY, RECTAL RECTAL EVERY 12 HOURS PRN
Status: DISCONTINUED | OUTPATIENT
Start: 2023-12-01 | End: 2023-12-04 | Stop reason: HOSPADM

## 2023-12-01 RX ORDER — LOSARTAN POTASSIUM AND HYDROCHLOROTHIAZIDE 25; 100 MG/1; MG/1
1 TABLET ORAL DAILY
COMMUNITY

## 2023-12-01 RX ORDER — FAMOTIDINE 20 MG/1
20 TABLET, FILM COATED ORAL 2 TIMES DAILY
COMMUNITY

## 2023-12-01 RX ADMIN — ALLOPURINOL 100 MG: 100 TABLET ORAL at 14:04

## 2023-12-01 RX ADMIN — ERTAPENEM 1000 MG: 1 INJECTION INTRAMUSCULAR; INTRAVENOUS at 12:18

## 2023-12-01 RX ADMIN — ACETAMINOPHEN 650 MG: 325 TABLET ORAL at 14:04

## 2023-12-01 RX ADMIN — POTASSIUM CHLORIDE 40 MEQ: 1500 TABLET, EXTENDED RELEASE ORAL at 14:04

## 2023-12-01 RX ADMIN — ACETAMINOPHEN 650 MG: 325 TABLET ORAL at 20:52

## 2023-12-01 RX ADMIN — POTASSIUM CHLORIDE 40 MEQ: 1500 TABLET, EXTENDED RELEASE ORAL at 18:17

## 2023-12-01 RX ADMIN — SUCRALFATE 1 G: 1 TABLET ORAL at 17:12

## 2023-12-01 RX ADMIN — HEPARIN SODIUM 5000 UNITS: 5000 INJECTION INTRAVENOUS; SUBCUTANEOUS at 20:47

## 2023-12-01 RX ADMIN — Medication 10 ML: at 14:05

## 2023-12-01 RX ADMIN — SENNOSIDES AND DOCUSATE SODIUM 2 TABLET: 8.6; 5 TABLET ORAL at 20:46

## 2023-12-01 RX ADMIN — HEPARIN SODIUM 5000 UNITS: 5000 INJECTION INTRAVENOUS; SUBCUTANEOUS at 14:04

## 2023-12-01 RX ADMIN — FAMOTIDINE 20 MG: 20 TABLET, FILM COATED ORAL at 20:46

## 2023-12-01 RX ADMIN — CETIRIZINE HYDROCHLORIDE 10 MG: 10 TABLET, FILM COATED ORAL at 14:04

## 2023-12-01 RX ADMIN — POTASSIUM CHLORIDE 40 MEQ: 1500 TABLET, EXTENDED RELEASE ORAL at 22:15

## 2023-12-01 RX ADMIN — SUCRALFATE 1 G: 1 TABLET ORAL at 20:47

## 2023-12-01 RX ADMIN — SODIUM CHLORIDE 1000 ML: 9 INJECTION, SOLUTION INTRAVENOUS at 12:18

## 2023-12-01 NOTE — ED PROVIDER NOTES
Subjective   History of Present Illness  Pleasant patient who presents to the ER for painful urination and left flank pain.  I spoke with this patient on the phone after positive blood cultures.  He tells me he was still feeling poorly and was recommended come back to the ER for further evaluation.  He has been taking his Levaquin.  He has had 2 positive blood cultures.  Have already spoken to the pharmacist who recommends Invanz.  He is accompanied by his sister.  He has been taking Tylenol for his fever.  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  No history of similar.        Review of Systems    Past Medical History:   Diagnosis Date    DVT of leg (deep venous thrombosis)     over 10 years ago after trauma to left leg     Hypertension     Kidney stone     Meniere disease        Allergies   Allergen Reactions    Zosyn [Piperacillin Sod-Tazobactam So] Rash     Has tolerated cefazolin, ceftriaxone       Past Surgical History:   Procedure Laterality Date    CHOLECYSTECTOMY N/A 2/15/2017    Procedure: CHOLECYSTECTOMY LAPAROSCOPIC ,UMBILICAL HERNIA REPAIR ;  Surgeon: Ryan Corona MD;  Location: [facility] OR;  Service:     CHOLECYSTECTOMY N/A 2/17/2017    Procedure: LAPRASCOPIC EVACUATION OF ABD HEMATOMA;  Surgeon: Ryan Corona MD;  Location: [facility] license of [facility] OR;  Service:     KNEE SURGERY         History reviewed. No pertinent family history.    Social History     Socioeconomic History    Marital status: Single   Tobacco Use    Smoking status: Former   Substance and Sexual Activity    Alcohol use: Yes     Comment: occasional / on weekends     Drug use: No           Objective   Physical Exam  Constitutional:       Appearance: He is well-developed. He is ill-appearing.   HENT:      Head: Normocephalic and atraumatic.      Right Ear: External ear normal.      Left Ear: External ear normal.      Nose: Nose normal.   Eyes:      Conjunctiva/sclera: Conjunctivae normal.      Pupils:  Pupils are equal, round, and reactive to light.   Cardiovascular:      Rate and Rhythm: Normal rate and regular rhythm.      Heart sounds: Normal heart sounds.   Pulmonary:      Effort: Pulmonary effort is normal.      Breath sounds: Normal breath sounds.   Abdominal:      General: Bowel sounds are normal.      Palpations: Abdomen is soft.      Tenderness: There is left CVA tenderness.   Musculoskeletal:         General: Normal range of motion.      Cervical back: Normal range of motion and neck supple.   Skin:     General: Skin is warm and dry.   Neurological:      Mental Status: He is alert and oriented to person, place, and time.   Psychiatric:         Behavior: Behavior normal.         Judgment: Judgment normal.         Critical Care    Performed by: Ramos Cedillo APRN  Authorized by: Meng Taylor MD    Critical care provider statement:     Critical care time (minutes):  35    Critical care time was exclusive of:  Separately billable procedures and treating other patients    Critical care was necessary to treat or prevent imminent or life-threatening deterioration of the following conditions:  Sepsis    Critical care was time spent personally by me on the following activities:  Ordering and performing treatments and interventions, ordering and review of laboratory studies, ordering and review of radiographic studies, pulse oximetry, re-evaluation of patient's condition, review of old charts, obtaining history from patient or surrogate, examination of patient, evaluation of patient's response to treatment, discussions with consultants and development of treatment plan with patient or surrogate             ED Course  ED Course as of 12/01/23 1241   Fri Dec 01, 2023   1239 Pleasant patient.  We are treating for urosepsis.  Vital signs stable.  I will admit to the hospitalist.  Patient is awake and alert. [JM]      ED Course User Index  [JM] Ramos Cedillo APRN                                             Medical  Decision Making  Problems Addressed:  Fever in adult: complicated acute illness or injury  Pyelonephritis: acute illness or injury  Sepsis, due to unspecified organism, unspecified whether acute organ dysfunction present: complicated acute illness or injury    Amount and/or Complexity of Data Reviewed  External Data Reviewed: labs and radiology.  Labs: ordered. Decision-making details documented in ED Course.  Radiology: ordered. Decision-making details documented in ED Course.  ECG/medicine tests: ordered. Decision-making details documented in ED Course.    Risk  Prescription drug management.  Decision regarding hospitalization.    Critical Care  Total time providing critical care: 35 minutes        Final diagnoses:   Pyelonephritis   Sepsis, due to unspecified organism, unspecified whether acute organ dysfunction present   Fever in adult       ED Disposition  ED Disposition       ED Disposition   Decision to Admit    Condition   --    Comment   --               No follow-up provider specified.       Medication List      No changes were made to your prescriptions during this visit.            Ramos Cedillo, APRN  12/01/23 1245

## 2023-12-01 NOTE — CONSULTS
Patient Name: Antonino Barrera  : 1955  MRN: [id number]    Date of Consult: 2023  Admission Date: 2023    Requesting Provider: Garcia  Evaluating Physician: Sancho Min MD    Chief Complaint: fever    Reason for Consultation: bacteremia    Subjective   Patient is a 68 y.o. male with history of prior kidney stones and prior UTIs but no history of prostatitis.  Presented to [facility] emergency department  with 2 to 3 days of fevers, malaise, flank pain and difficulty urinating.  He was given dose ceftriaxone in emergency department and discharged home on levofloxacin but continued to feel poorly.  Blood cultures returned positive for ESBL E. coli so he was called back to hospital for admission.  Started on ertapenem.  ID was asked to evaluate.  Patient still having high fever up to 102.3.  Ongoing flank discomfort but now seems to be voiding better.  He does not passed any kidney stones recently and no recent urological procedures    Listed history of allergy to Zosyn in 2017, which apparently was related to the time he had a surgical procedure and based on description sounds like he had more of a contact dermatitis from some sort of cleaning solution used in the OR rather than no systemic reaction to the antibiotic.  Patient says he has tolerated amoxicillin since then    Review of Systems  See HPI    Past Medical History:   Diagnosis Date    DVT of leg (deep venous thrombosis)     over 10 years ago after trauma to left leg     Hypertension     Kidney stone     Meniere disease        Past Surgical History:   Procedure Laterality Date    CHOLECYSTECTOMY N/A 2/15/2017    Procedure: CHOLECYSTECTOMY LAPAROSCOPIC ,UMBILICAL HERNIA REPAIR ;  Surgeon: Ryan Corona MD;  Location: [facility] OR;  Service:     CHOLECYSTECTOMY N/A 2017    Procedure: LAPRASCOPIC EVACUATION OF ABD HEMATOMA;  Surgeon: Ryan Corona MD;  Location: [facility] OR;  Service:     KNEE SURGERY         Social  History     Socioeconomic History    Marital status: Single   Tobacco Use    Smoking status: Former   Substance and Sexual Activity    Alcohol use: Yes     Comment: occasional / on weekends     Drug use: No        History reviewed. No pertinent family history.    Allergies   Allergen Reactions    Zosyn [Piperacillin Sod-Tazobactam So] Rash     Has tolerated cefazolin, ceftriaxone       Objective   Antibiotics:  Anti-Infectives (From admission, onward)      Ordered     Dose/Rate Route Frequency Start Stop    12/01/23 1328  ertapenem (INVanz) 1,000 mg in sodium chloride 0.9 % 100 mL IVPB        Ordering Provider: Moisés Zelaya MD    1,000 mg  200 mL/hr over 30 Minutes Intravenous Every 24 Hours 12/02/23 1200 12/07/23 1159    12/01/23 1113  ertapenem (INVanz) 1,000 mg in sodium chloride 0.9 % 100 mL IVPB        Ordering Provider: Ramos Cedillo APRN    1,000 mg  200 mL/hr over 30 Minutes Intravenous Once 12/01/23 1129 12/01/23 1248            Other Medications:  Current Facility-Administered Medications   Medication Dose Route Frequency Provider Last Rate Last Admin    acetaminophen (TYLENOL) tablet 650 mg  650 mg Oral Q6H PRN Moisés Zelaya MD   650 mg at 12/01/23 1404    allopurinol (ZYLOPRIM) tablet 100 mg  100 mg Oral Daily Moisés Zelaya MD   100 mg at 12/01/23 1404    sennosides-docusate (PERICOLACE) 8.6-50 MG per tablet 2 tablet  2 tablet Oral BID Moisés Zelaya MD        And    polyethylene glycol (MIRALAX) packet 17 g  17 g Oral Daily PRN Moisés Zelaya MD        And    bisacodyl (DULCOLAX) EC tablet 5 mg  5 mg Oral Daily PRN Moisés Zelaya MD        And    bisacodyl (DULCOLAX) suppository 10 mg  10 mg Rectal Daily PRN Moisés Zelaya MD        Calcium Replacement - Follow Nurse / BPA Driven Protocol   Does not apply PRN Moisés Zelaya MD        cetirizine (zyrTEC) tablet 10 mg  10 mg Oral Daily Moisés Zelaya MD   10 mg at 12/01/23 1404    [START ON 12/2/2023] colchicine tablet 0.6 mg  0.6 mg  Oral Daily Moisés Zelaya MD        dextrose (D50W) (25 g/50 mL) IV injection 25 g  25 g Intravenous Q15 Min PRN Moisés Zelaya MD        dextrose (GLUTOSE) oral gel 15 g  15 g Oral Q15 Min PRN Moisés Zelaya MD        [START ON 12/2/2023] ertapenem (INVanz) 1,000 mg in sodium chloride 0.9 % 100 mL IVPB  1,000 mg Intravenous Q24H Moisés Zelaya MD        famotidine (PEPCID) tablet 20 mg  20 mg Oral BID Moisés Zelaya MD        glucagon (GLUCAGEN) injection 1 mg  1 mg Intramuscular Q15 Min PRN Moisés Zelaya MD        heparin (porcine) 5000 UNIT/ML injection 5,000 Units  5,000 Units Subcutaneous Q8H Moisés Zelaya MD   5,000 Units at 12/01/23 1404    Insulin Lispro (humaLOG) injection 2-7 Units  2-7 Units Subcutaneous 4x Daily AC & at Bedtime Moisés Zelaya MD        Magnesium Standard Dose Replacement - Follow Nurse / BPA Driven Protocol   Does not apply PRMoisés Santiago MD        ondansetron (ZOFRAN) tablet 4 mg  4 mg Oral Q6H PRN Moisés Zelaya MD        Or    ondansetron (ZOFRAN) injection 4 mg  4 mg Intravenous Q6H PRN Moisés Zelaya MD        [START ON 12/2/2023] pantoprazole (PROTONIX) EC tablet 40 mg  40 mg Oral Q AM Moisés Zelaya MD        Phosphorus Replacement - Follow Nurse / BPA Driven Protocol   Does not apply Moisés Thomson MD        potassium chloride (K-DUR,KLOR-CON) CR tablet 40 mEq  40 mEq Oral Q4H Moisés Zelaya MD   40 mEq at 12/01/23 1404    Potassium Replacement - Follow Nurse / BPA Driven Protocol   Does not apply Moisés Thomson MD        prochlorperazine (COMPAZINE) injection 5 mg  5 mg Intravenous Q6H PRN Moisés Zelaya MD        Or    prochlorperazine (COMPAZINE) tablet 5 mg  5 mg Oral Q6H PRN Moisés Zelaya MD        Or    prochlorperazine (COMPAZINE) suppository 25 mg  25 mg Rectal Q12H PRN Moisés Zelaya MD        sodium chloride 0.9 % flush 10 mL  10 mL Intravenous PRN Moisés Zelaya MD        sodium chloride 0.9 % flush 10 mL  10 mL  "Intravenous Q12H Moisés Zelaya MD   10 mL at 12/01/23 1405    sodium chloride 0.9 % flush 10 mL  10 mL Intravenous PRN Moisés Zelaya MD        sodium chloride 0.9 % infusion 40 mL  40 mL Intravenous PRN Moisés Zelaya MD        sucralfate (CARAFATE) tablet 1 g  1 g Oral 4x Daily Moisés Zelaya MD           Physical Exam:   Vital Signs   /72 (BP Location: Right arm, Patient Position: Lying)   Pulse 109   Temp (!) 102.9 °F (39.4 °C) (Oral)   Resp 18   Ht 175.3 cm (69\")   Wt 85.7 kg (189 lb)   SpO2 96%   BMI 27.91 kg/m²     GENERAL: Awake and alert, in no acute distress.   HEENT: Normocephalic, atraumatic.   EOMI. No conjunctival injection. No icterus.   NECK: Supple without nuchal rigidity.   HEART: RRR; No murmur.  LUNGS: Clear to auscultation bilaterally without wheezing, rales, rhonchi. Normal respiratory effort. Nonlabored.  ABDOMEN: Soft, some suprapubic and CVA discomfort guarding.  : Without Fuller catheter.  MSK: FROM without joint effusions noted arms/legs.    SKIN: Warm and dry without cutaneous eruptions on Inspection/palpation.    NEURO: Oriented to PPT. No focal deficits on motor/sensory exam at arms/legs.  PSYCHIATRIC: Normal insight and judgement. Cooperative with [facility]    Laboratory Data  Lab Results   Component Value Date    WBC 15.87 (H) 12/01/2023    HGB 12.8 (L) 12/01/2023    HCT 36.6 (L) 12/01/2023    MCV 88.8 12/01/2023     12/01/2023     Lab Results   Component Value Date    GLUCOSE 116 (H) 12/01/2023    CALCIUM 8.8 12/01/2023     (L) 12/01/2023    K 3.0 (L) 12/01/2023    CO2 21.0 (L) 12/01/2023    CL 94 (L) 12/01/2023    BUN 20 12/01/2023    CREATININE 1.38 (H) 12/01/2023     Estimated Creatinine Clearance: 54.8 mL/min (A) (by C-G formula based on SCr of 1.38 mg/dL (H)).  Lab Results   Component Value Date    ALT 15 12/01/2023    AST 17 12/01/2023    ALKPHOS 76 12/01/2023    BILITOT 1.5 (H) 12/01/2023     No results found for: \"CRP\"  No results found for: " "\"SEDRATE\"    The above labs were reviewed.     Microbiology:  Microbiology Results (last 10 days)       Procedure Component Value - Date/Time    Blood Culture - Blood, Arm, Left [[id number]]  (Abnormal) Collected: 11/30/23 1253    Lab Status: Preliminary result Specimen: Blood from Arm, Left Updated: 12/01/23 0523     Blood Culture Abnormal Stain     Gram Stain Aerobic Bottle Gram negative bacilli    Blood Culture ID, PCR - Blood, Arm, Left [[id number]]  (Abnormal) Collected: 11/30/23 1253    Lab Status: Final result Specimen: Blood from Arm, Left Updated: 12/01/23 0656     BCID, PCR Escherichia coli. Identification by BCID2 PCR.     BCID, PCR 2 CTX-M (ESBL) detected. Identification by BCID2 PCR     BOTTLE TYPE Aerobic Bottle            Radiology:  XR Chest 1 View    Result Date: 12/1/2023  Impression: Low lung volumes with hypoventilatory change including bibasilar atelectasis. No focal consolidation. Electronically Signed: Jadiel Teran MD  12/1/2023 11:38 AM EST  Workstation ID: [id number]    CT Abdomen Pelvis With Contrast    Result Date: 11/30/2023  Impression: 1.Imaging features are suggestive of left-sided pyelonephritis without evidence of obstruction. Correlate with symptoms and lab values. 2.Interval likely chronic T11 compression fracture. Electronically Signed: Meng Browne MD  11/30/2023 4:23 PM CST  Workstation ID: [id number]     I personally reviewed the above CT: Left-sided renal swelling      Assessment   ESBL E. coli bacteremia and pyelonephritis  Fever, sepsis due to above  Neutrophilic leukocytosis  Acute kidney injury  Hyponatremia  History of renal stones but none identified on CT imaging  Possible history of prostatomegaly, but no history of prostatitis: No abnormality noted on CT scan  Listed history of allergy to Zosyn in 2017, which apparently was related to the time he had a surgical procedure and based on description sounds like he had more of a contact dermatitis from some sort of cleaning " solution used in the OR rather than no systemic reaction to the antibiotic.  Patient says he has tolerated amoxicillin since then, so allergy disproved    PLAN:   - Microbiology data reviewed.  Follow-up pending sensitivity data for E. coli as well as repeat blood cultures and urine culture  - Follow CBC, CMP    - Ertapenem 1 g daily.    - Probiotic while on antibiotics    Planning for IV antibiotics for at least 7 to 10 days.  Discussed possibly daily infusion at [facility] once otherwise ready for discharge if he will be staying locally.  Patient has staying with his sister, who lives in [place], but also says that his PCP in [place] can provide daily IV antibiotics through his office.     Discussed with patient and sister at bedside.  I will follow over the weekend    Sancho Min MD  12/1/2023

## 2023-12-01 NOTE — H&P
[facility] Medicine Services  HISTORY AND PHYSICAL    Patient Name: Antonino Barrera  : 1955  MRN: [id number]  Primary Care Physician: Provider, No Known  Date of admission: 2023      Subjective   Subjective     Chief Complaint: Rigors    HPI:  Antonino Barrera is a 68 y.o. male with history of 24-36 hours of f/c/sweats/rigors. Had n/v. Fell two nights ago. Starting having L flank pain yesterday morning. Evaluated in ED  and found to have pyelonephritis and he was sent home on PO abx, but had continued rigors and found to have bacteremia.    Review of Systems   Constitutional:  Positive for activity change, chills, diaphoresis, fatigue and fever.   HENT: Negative.     Respiratory: Negative.     Cardiovascular: Negative.    Gastrointestinal: Negative.    Genitourinary:  Positive for dysuria, flank pain and frequency.   Neurological:  Positive for weakness.           Personal History     Past Medical History:   Diagnosis Date    DVT of leg (deep venous thrombosis)     over 10 years ago after trauma to left leg     Hypertension     Kidney stone     Meniere disease              Past Surgical History:   Procedure Laterality Date    CHOLECYSTECTOMY N/A 2/15/2017    Procedure: CHOLECYSTECTOMY LAPAROSCOPIC ,UMBILICAL HERNIA REPAIR ;  Surgeon: Ryan Corona MD;  [facility];  Service:     CHOLECYSTECTOMY N/A 2017    Procedure: LAPRASCOPIC EVACUATION OF ABD HEMATOMA;  Surgeon: Ryan Corona MD;  Location: Critical access hospital;  Service:     KNEE SURGERY         Family History: family history is not on file.     Social History:  reports that he has quit smoking. He does not have any smokeless tobacco history on file. He reports current alcohol use. He reports that he does not use drugs.  Social History     Social History Narrative    Not on file       Medications:  Available home medication information reviewed.  (Not in a hospital admission)      Allergies   Allergen  Reactions    Zosyn [Piperacillin Sod-Tazobactam So] Rash     Has tolerated cefazolin, ceftriaxone       Objective   Objective     Vital Signs:   Temp:  [99.6 °F (37.6 °C)-101.4 °F (38.6 °C)] 99.6 °F (37.6 °C)  Heart Rate:  [] 109  Resp:  [18-26] 24  BP: (121-142)/(59-84) 142/84       Physical Exam   NAD, alert  Rigors  OP clear, dry MM  Neck supple  No LAD  Tachy  CTAB  +BS, soft  CHONG  Normal affect  No rashes    Result Review:  I have personally reviewed the results from the time of this admission to 12/1/2023 13:30 EST and agree with these findings:  []  Laboratory list / accordion  []  Microbiology  []  Radiology  []  EKG/Telemetry   []  Cardiology/Vascular   []  Pathology  []  Old records  []  Other:  Most notable findings include: WBC 15, sodium 126, GNR in blood, procal 10.4        LAB RESULTS:      Lab 12/01/23  1152 11/30/23  1253   WBC 15.87* 19.33*   HEMOGLOBIN 12.8* 14.5   HEMATOCRIT 36.6* 42.8   PLATELETS 153 194   NEUTROS ABS 13.44* 16.55*   IMMATURE GRANS (ABS) 0.27* 0.23*   LYMPHS ABS 0.59* 0.64*   MONOS ABS 1.53* 1.84*   EOS ABS 0.01 0.01   MCV 88.8 88.2   PROCALCITONIN 10.41*  --    LACTATE 1.2 1.6         Lab 12/01/23  1152 11/30/23  1253   SODIUM 126* 132*   POTASSIUM 3.0* 3.3*   CHLORIDE 94* 97*   CO2 21.0* 22.0   ANION GAP 11.0 13.0   BUN 20 18   CREATININE 1.38* 1.37*   EGFR 55.4* 55.8*   GLUCOSE 116* 144*   CALCIUM 8.8 9.2         Lab 12/01/23  1152 11/30/23  1253   TOTAL PROTEIN 5.8* 6.6   ALBUMIN 3.2* 3.8   GLOBULIN 2.6 2.8   ALT (SGPT) 15 19   AST (SGOT) 17 24   BILIRUBIN 1.5* 2.1*   ALK PHOS 76 92   LIPASE  --  13         Lab 12/01/23  1152   HSTROP T 17                 UA          11/30/2023    12:43   Urinalysis   Squamous Epithelial Cells, UA 0-2    Specific [place], UA 1.015    Ketones, UA Trace    Blood, UA Small (1+)    Leukocytes, UA Large (3+)    Nitrite, UA Negative    RBC, UA 6-10    WBC, UA Too Numerous to Count    Bacteria, UA 3+        Microbiology Results (last 10 days)        Procedure Component Value - Date/Time    Blood Culture - Blood, Arm, Left [[id number]]  (Abnormal) Collected: 11/30/23 1253    Lab Status: Preliminary result Specimen: Blood from Arm, Left Updated: 12/01/23 0523     Blood Culture Abnormal Stain     Gram Stain Aerobic Bottle Gram negative bacilli    Blood Culture ID, PCR - Blood, Arm, Left [[id number]]  (Abnormal) Collected: 11/30/23 1253    Lab Status: Final result Specimen: Blood from Arm, Left Updated: 12/01/23 0656     BCID, PCR Escherichia coli. Identification by BCID2 PCR.     BCID, PCR 2 CTX-M (ESBL) detected. Identification by BCID2 PCR     BOTTLE TYPE Aerobic Bottle            XR Chest 1 View    Result Date: 12/1/2023  XR CHEST 1 VW Date of Exam: 12/1/2023 11:11 AM EST Indication: urosepsis Comparison: None available. Findings: Cardiomediastinal silhouette is borderline prominent, as imaged on this portable radiograph. Low lung volumes with hypoventilatory change including bibasilar atelectasis. No pleural effusion or pneumothorax. Osseous structures are unremarkable.     Impression: Impression: Low lung volumes with hypoventilatory change including bibasilar atelectasis. No focal consolidation. Electronically Signed: Jadiel Teran MD  12/1/2023 11:38 AM EST  Workstation ID: [id number]    CT Abdomen Pelvis With Contrast    Result Date: 11/30/2023  CT ABDOMEN PELVIS W CONTRAST Date of Exam: 11/30/2023 3:44 PM CST Indication: right flank pain fever , chills. Comparison: 2/18/2017 Technique: Axial CT images were obtained of the abdomen and pelvis following the uneventful intravenous administration of 95 cc Isovue-300. Reconstructed coronal and sagittal images were also obtained. Automated exposure control and iterative construction methods were used. Findings: LUNG BASES:  Unremarkable without mass or infiltrate. LIVER:  Unremarkable parenchyma without focal lesion. BILIARY/GALLBLADDER: Cholecystectomy SPLEEN:  Unremarkable PANCREAS:  Unremarkable ADRENAL:   Unremarkable KIDNEYS: Heterogeneous cortical enhancement involving the left kidney with perinephric and periureteral stranding. No evidence of obstruction. Imaging features suggestive of pyelonephritis. Right kidney is unremarkable in appearance. No calculus identified. GASTROINTESTINAL/MESENTERY:  No evidence of obstruction nor inflammation.  The appendix is normal. MESENTERIC VESSELS:  Patent. AORTA/IVC:  Normal caliber. RETROPERITONEUM/LYMPH NODES:  Unremarkable REPRODUCTIVE:  Unremarkable BLADDER:  Unremarkable OSSEUS STRUCTURES: Interval likely chronic T11 compression fracture.     Impression: Impression: 1.Imaging features are suggestive of left-sided pyelonephritis without evidence of obstruction. Correlate with symptoms and lab values. 2.Interval likely chronic T11 compression fracture. Electronically Signed: Meng Browne MD  11/30/2023 4:23 PM CST  Workstation ID: [id number]         Assessment & Plan   Assessment & Plan     Active Hospital Problems    Diagnosis  POA    **Bacteremia [R78.81]  Yes       Sepsis  Bacteremia  Pyelonephritis  -IVF  -IVF abx  -ID consulted    Hyponatremia  -IVF    Rigors/fevers/chills  -tylenol    Leukocytosis  -monitor    Gout  -resume home meds    Hx of HTN  -monitor BP    DM  -SSI    DVT prophylaxis:  SANKET    CODE STATUS:  Full/CPR  Code Status and Medical Interventions:   Ordered at: 12/01/23 1328     Code Status (Patient has no pulse and is not breathing):    CPR (Attempt to Resuscitate)     Medical Interventions (Patient has pulse or is breathing):    Full Support         Moisés Zelaya MD  12/01/23

## 2023-12-01 NOTE — Clinical Note
Level of Care: Telemetry [5]   Diagnosis: Bacteremia [790.7.ICD-9-CM]   Admitting Physician: TIM DIXON [[id number]   Attending Physician: TIM DIXON [[id number]]   Certification: I Certify That Inpatient Hospital Services Are Medically Necessary For Greater Than 2 Midnights

## 2023-12-01 NOTE — PLAN OF CARE
Goal Outcome Evaluation:         Pt arrived to 5[facility] this afternoon. A&Ox4. RA. NSR to sinus tach. Tmax 102.9 and is now 98.6. Pt feeling better. Bolus and IV ABX finished. SB assist. Safety maintained. Only complaining of carpal tunnel pain in R hand, prn tylenol given.

## 2023-12-01 NOTE — TELEPHONE ENCOUNTER
Patient called back and said he was planning on come back to our hospital instead of [facility].  Think that is reasonable.  Also spoke to the patient's sister who is concerned about sepsis and bacteremia.  I do think that is a reasonable concern.  I advised her that he should come back to the emergency department today for further evaluation and possible admission.  She was thankful for the conversation she agrees.  She plans on bring him back to our hospital.  Also advised if he is too sick to make her hospital he needs to come to the nearest hospital for further evaluation.  She verbalized understanding was thankful for the conversation.

## 2023-12-01 NOTE — TELEPHONE ENCOUNTER
I spoke with the patient.  He is starting some pain in his side.  He tells me that his regular doctor is planning on taking him to a hospital [facility] today to be admitted with his pyelonephritis.  I do think that is a good idea.  He tells me he lives in [place].  Also recommended that can also return to our hospital for further evaluation with a good possibility of admission given his findings.  He tells me his doctor is going to pick him up today and take him to [place] for admission.  Either way I do think he would benefit from hospitalization.  I spoke to the lab and I added on a urinary culture.  I advised the patient that he may call or have his doctor call and I will speak to him today for any questions they may have about his results to help his care further.  He is aware that he has pyelonephritis and most likely sepsis.  Tells me overall he feels about the same and has been taking Tylenol for his fever and he has been taking his antibiotic.  He understands that a culture has been ordered and that sometimes the antibiotic will need to be changed based on the susceptibilities but that takes time.  In summary advised he needs to return to the hospital for further valuation of possible admission.  He tells me he is going to be taken to another hospital for further evaluation.  I advised that he may have them call us we can discuss his results if they have any questions.  Patient was thankful for the call.

## 2023-12-02 LAB
ALBUMIN SERPL-MCNC: 3.1 G/DL (ref 3.5–5.2)
ALBUMIN/GLOB SERPL: 1.2 G/DL
ALP SERPL-CCNC: 77 U/L (ref 39–117)
ALT SERPL W P-5'-P-CCNC: 11 U/L (ref 1–41)
ANION GAP SERPL CALCULATED.3IONS-SCNC: 10 MMOL/L (ref 5–15)
AST SERPL-CCNC: 17 U/L (ref 1–40)
BACTERIA SPEC AEROBE CULT: NO GROWTH
BACTERIA SPEC AEROBE CULT: NORMAL
BASOPHILS # BLD AUTO: 0.04 10*3/MM3 (ref 0–0.2)
BASOPHILS NFR BLD AUTO: 0.3 % (ref 0–1.5)
BILIRUB SERPL-MCNC: 1.4 MG/DL (ref 0–1.2)
BUN SERPL-MCNC: 19 MG/DL (ref 8–23)
BUN/CREAT SERPL: 14.7 (ref 7–25)
CALCIUM SPEC-SCNC: 9.3 MG/DL (ref 8.6–10.5)
CHLORIDE SERPL-SCNC: 99 MMOL/L (ref 98–107)
CO2 SERPL-SCNC: 22 MMOL/L (ref 22–29)
CREAT SERPL-MCNC: 1.29 MG/DL (ref 0.76–1.27)
DEPRECATED RDW RBC AUTO: 43.5 FL (ref 37–54)
EGFRCR SERPLBLD CKD-EPI 2021: 60 ML/MIN/1.73
EOSINOPHIL # BLD AUTO: 0.19 10*3/MM3 (ref 0–0.4)
EOSINOPHIL NFR BLD AUTO: 1.6 % (ref 0.3–6.2)
ERYTHROCYTE [DISTWIDTH] IN BLOOD BY AUTOMATED COUNT: 13.2 % (ref 12.3–15.4)
GLOBULIN UR ELPH-MCNC: 2.5 GM/DL
GLUCOSE BLDC GLUCOMTR-MCNC: 121 MG/DL (ref 70–130)
GLUCOSE BLDC GLUCOMTR-MCNC: 79 MG/DL (ref 70–130)
GLUCOSE BLDC GLUCOMTR-MCNC: 88 MG/DL (ref 70–130)
GLUCOSE BLDC GLUCOMTR-MCNC: 98 MG/DL (ref 70–130)
GLUCOSE SERPL-MCNC: 81 MG/DL (ref 65–99)
HCT VFR BLD AUTO: 35.4 % (ref 37.5–51)
HGB BLD-MCNC: 12.1 G/DL (ref 13–17.7)
IMM GRANULOCYTES # BLD AUTO: 0.11 10*3/MM3 (ref 0–0.05)
IMM GRANULOCYTES NFR BLD AUTO: 0.9 % (ref 0–0.5)
LYMPHOCYTES # BLD AUTO: 0.69 10*3/MM3 (ref 0.7–3.1)
LYMPHOCYTES NFR BLD AUTO: 6 % (ref 19.6–45.3)
MCH RBC QN AUTO: 30.5 PG (ref 26.6–33)
MCHC RBC AUTO-ENTMCNC: 34.2 G/DL (ref 31.5–35.7)
MCV RBC AUTO: 89.2 FL (ref 79–97)
MONOCYTES # BLD AUTO: 1.36 10*3/MM3 (ref 0.1–0.9)
MONOCYTES NFR BLD AUTO: 11.7 % (ref 5–12)
NEUTROPHILS NFR BLD AUTO: 79.5 % (ref 42.7–76)
NEUTROPHILS NFR BLD AUTO: 9.2 10*3/MM3 (ref 1.7–7)
NRBC BLD AUTO-RTO: 0 /100 WBC (ref 0–0.2)
PLATELET # BLD AUTO: 164 10*3/MM3 (ref 140–450)
PMV BLD AUTO: 10.5 FL (ref 6–12)
POTASSIUM SERPL-SCNC: 4.2 MMOL/L (ref 3.5–5.2)
PROT SERPL-MCNC: 5.6 G/DL (ref 6–8.5)
RBC # BLD AUTO: 3.97 10*6/MM3 (ref 4.14–5.8)
SODIUM SERPL-SCNC: 131 MMOL/L (ref 136–145)
WBC NRBC COR # BLD AUTO: 11.59 10*3/MM3 (ref 3.4–10.8)

## 2023-12-02 PROCEDURE — 25010000002 ERTAPENEM PER 500 MG: Performed by: HOSPITALIST

## 2023-12-02 PROCEDURE — 82948 REAGENT STRIP/BLOOD GLUCOSE: CPT

## 2023-12-02 PROCEDURE — 85025 COMPLETE CBC W/AUTO DIFF WBC: CPT | Performed by: HOSPITALIST

## 2023-12-02 PROCEDURE — 92610 EVALUATE SWALLOWING FUNCTION: CPT

## 2023-12-02 PROCEDURE — 97161 PT EVAL LOW COMPLEX 20 MIN: CPT

## 2023-12-02 PROCEDURE — 99233 SBSQ HOSP IP/OBS HIGH 50: CPT | Performed by: HOSPITALIST

## 2023-12-02 PROCEDURE — 97530 THERAPEUTIC ACTIVITIES: CPT

## 2023-12-02 PROCEDURE — 25010000002 HEPARIN (PORCINE) PER 1000 UNITS: Performed by: HOSPITALIST

## 2023-12-02 PROCEDURE — 80053 COMPREHEN METABOLIC PANEL: CPT | Performed by: HOSPITALIST

## 2023-12-02 RX ORDER — L.ACID,PARA/B.BIFIDUM/S.THERM 8B CELL
1 CAPSULE ORAL 2 TIMES DAILY
Status: DISCONTINUED | OUTPATIENT
Start: 2023-12-02 | End: 2023-12-04 | Stop reason: HOSPADM

## 2023-12-02 RX ADMIN — ALLOPURINOL 100 MG: 100 TABLET ORAL at 08:21

## 2023-12-02 RX ADMIN — Medication 1 CAPSULE: at 20:59

## 2023-12-02 RX ADMIN — HEPARIN SODIUM 5000 UNITS: 5000 INJECTION INTRAVENOUS; SUBCUTANEOUS at 05:21

## 2023-12-02 RX ADMIN — FAMOTIDINE 20 MG: 20 TABLET, FILM COATED ORAL at 08:21

## 2023-12-02 RX ADMIN — Medication 10 ML: at 21:00

## 2023-12-02 RX ADMIN — COLCHICINE 0.6 MG: 0.6 TABLET ORAL at 08:21

## 2023-12-02 RX ADMIN — SUCRALFATE 1 G: 1 TABLET ORAL at 17:41

## 2023-12-02 RX ADMIN — SUCRALFATE 1 G: 1 TABLET ORAL at 20:59

## 2023-12-02 RX ADMIN — ERTAPENEM 1000 MG: 1 INJECTION INTRAMUSCULAR; INTRAVENOUS at 12:33

## 2023-12-02 RX ADMIN — Medication 1 CAPSULE: at 08:20

## 2023-12-02 RX ADMIN — PANTOPRAZOLE SODIUM 40 MG: 40 TABLET, DELAYED RELEASE ORAL at 05:21

## 2023-12-02 RX ADMIN — CETIRIZINE HYDROCHLORIDE 10 MG: 10 TABLET, FILM COATED ORAL at 08:21

## 2023-12-02 RX ADMIN — HEPARIN SODIUM 5000 UNITS: 5000 INJECTION INTRAVENOUS; SUBCUTANEOUS at 20:59

## 2023-12-02 RX ADMIN — SUCRALFATE 1 G: 1 TABLET ORAL at 08:21

## 2023-12-02 RX ADMIN — FAMOTIDINE 20 MG: 20 TABLET, FILM COATED ORAL at 20:59

## 2023-12-02 RX ADMIN — SUCRALFATE 1 G: 1 TABLET ORAL at 12:33

## 2023-12-02 RX ADMIN — Medication 10 ML: at 08:21

## 2023-12-02 RX ADMIN — HEPARIN SODIUM 5000 UNITS: 5000 INJECTION INTRAVENOUS; SUBCUTANEOUS at 14:02

## 2023-12-02 NOTE — PROGRESS NOTES
Patient Name: Antonino Barrera  : 1955  MRN: [id number]    Subjective   Patient is a 68 y.o. male with history of prior kidney stones and prior UTIs but no history of prostatitis.  Presented to [facility], [place], operated by [facility] emergency department  with 2 to 3 days of fevers, malaise, flank pain and difficulty urinating.  He was given dose ceftriaxone in emergency department and discharged home on levofloxacin but continued to feel poorly.  Blood cultures returned positive for ESBL E. coli so he was called back to hospital for admission.  Started on ertapenem.  ID was asked to evaluate.  Patient still having high fever up to 102.3.  Ongoing flank discomfort but now seems to be voiding better.  He does not passed any kidney stones recently and no recent urological procedures    Listed history of allergy to Zosyn in 2017, which apparently was related to the time he had a surgical procedure and based on description sounds like he had more of a contact dermatitis from some sort of cleaning solution used in the OR rather than no systemic reaction to the antibiotic.  Patient says he has tolerated amoxicillin since then    23: Temperature up to 101.9 overnight.  Tachycardia improved.  Blood pressure stable.  Episodes of loose stools overnight.  He received stool softeners last night.  C. difficile test was ordered this morning.  Overall feeling better no side effects from antibiotics noted per patient or RN    Review of Systems  See HPI      No Active Allergies      Objective   Antibiotics:  Anti-Infectives (From admission, onward)      Ordered     Dose/Rate Route Frequency Start Stop    23 1328  ertapenem (INVanz) 1,000 mg in sodium chloride 0.9 % 100 mL IVPB        Ordering Provider: Moisés Zelaya MD    1,000 mg  200 mL/hr over 30 Minutes Intravenous Every 24 Hours 23 1200 23 1159    23 1113  ertapenem (INVanz) 1,000 mg in sodium chloride 0.9 % 100 mL IVPB        Ordering Provider: Ramos Cedillo APRN     1,000 mg  200 mL/hr over 30 Minutes Intravenous Once 12/01/23 1129 12/01/23 1248            Other Medications:  Current Facility-Administered Medications   Medication Dose Route Frequency Provider Last Rate Last Admin    acetaminophen (TYLENOL) tablet 650 mg  650 mg Oral Q6H PRN Moisés Zelaya MD   650 mg at 12/01/23 2052    allopurinol (ZYLOPRIM) tablet 100 mg  100 mg Oral Daily Moisés Zelaya MD   100 mg at 12/02/23 0821    Calcium Replacement - Follow Nurse / BPA Driven Protocol   Does not apply PRN Moisés Zelaya MD        cetirizine (zyrTEC) tablet 10 mg  10 mg Oral Daily Moisés Zelaya MD   10 mg at 12/02/23 0821    colchicine tablet 0.6 mg  0.6 mg Oral Daily Moisés Zelaya MD   0.6 mg at 12/02/23 0821    dextrose (D50W) (25 g/50 mL) IV injection 25 g  25 g Intravenous Q15 Min PRN Moisés Zelaya MD        dextrose (GLUTOSE) oral gel 15 g  15 g Oral Q15 Min PRN Moisés Zelaya MD        ertapenem (INVanz) 1,000 mg in sodium chloride 0.9 % 100 mL IVPB  1,000 mg Intravenous Q24H Moisés Zelaya  mL/hr at 12/02/23 1233 1,000 mg at 12/02/23 1233    famotidine (PEPCID) tablet 20 mg  20 mg Oral BID Moisés Zelaya MD   20 mg at 12/02/23 0821    glucagon (GLUCAGEN) injection 1 mg  1 mg Intramuscular Q15 Min PRN Moisés Zelaya MD        heparin (porcine) 5000 UNIT/ML injection 5,000 Units  5,000 Units Subcutaneous Q8H Moisés Zelaya MD   5,000 Units at 12/02/23 0521    Insulin Lispro (humaLOG) injection 2-7 Units  2-7 Units Subcutaneous 4x Daily AC & at Bedtime Moisés Zelaya MD        lactobacillus acidophilus (RISAQUAD) capsule 1 capsule  1 capsule Oral Daily Sancho Min MD   1 capsule at 12/02/23 0820    Magnesium Standard Dose Replacement - Follow Nurse / BPA Driven Protocol   Does not apply PRN Moisés Zelaya MD        ondansetron (ZOFRAN) tablet 4 mg  4 mg Oral Q6H PRN Moisés Zelaya MD        Or    ondansetron (ZOFRAN) injection 4 mg  4 mg Intravenous Q6H PRN Moisés Zelaya  "MD ROMAINE        pantoprazole (PROTONIX) EC tablet 40 mg  40 mg Oral Q AM Moisés Zelaya MD   40 mg at 12/02/23 0521    Phosphorus Replacement - Follow Nurse / BPA Driven Protocol   Does not apply PRMoisés Santiago MD        Potassium Replacement - Follow Nurse / BPA Driven Protocol   Does not apply Moisés Thomson MD        prochlorperazine (COMPAZINE) injection 5 mg  5 mg Intravenous Q6H PRN Moisés Zelaya MD        Or    prochlorperazine (COMPAZINE) tablet 5 mg  5 mg Oral Q6H PRN Moisés Zelaya MD        Or    prochlorperazine (COMPAZINE) suppository 25 mg  25 mg Rectal Q12H PRN Moisés Zelaya MD        sodium chloride 0.9 % flush 10 mL  10 mL Intravenous PRN Moisés Zelaya MD        sodium chloride 0.9 % flush 10 mL  10 mL Intravenous Q12H Moisés Zelaya MD   10 mL at 12/02/23 0821    sodium chloride 0.9 % flush 10 mL  10 mL Intravenous PRN Moisés Zelaya MD        sodium chloride 0.9 % infusion 40 mL  40 mL Intravenous PRN Moisés Zelaya MD        sucralfate (CARAFATE) tablet 1 g  1 g Oral 4x Daily Moisés Zelaya MD   1 g at 12/02/23 1233       Physical Exam:   Vital Signs   /71 (BP Location: Right arm, Patient Position: Lying)   Pulse 71   Temp 98.9 °F (37.2 °C) (Oral)   Resp 18   Ht 175.3 cm (69\")   Wt 85.7 kg (189 lb)   SpO2 95%   BMI 27.91 kg/m²     GENERAL: Awake and alert, in no acute distress.   HEENT: Normocephalic, atraumatic.   EOMI. No conjunctival injection. No icterus.   HEART: RRR; No murmur.  LUNGS:   Normal respiratory effort. Nonlabored.  ABDOMEN: Soft, some suprapubic and CVA discomfort guarding.less pain  : no catheter  MSK: FROM without joint effusions noted arms/legs.    SKIN: Warm and dry without cutaneous eruptions on Inspection/palpation.    NEURO: Oriented to PPT.  Independently ambulatory  PSYCHIATRIC: Normal insight and judgement. Cooperative with PE  PIV    Laboratory Data  Lab Results   Component Value Date    WBC 11.59 (H) 12/02/2023    HGB 12.1 " "(L) 12/02/2023    HCT 35.4 (L) 12/02/2023    MCV 89.2 12/02/2023     12/02/2023     Lab Results   Component Value Date    GLUCOSE 81 12/02/2023    CALCIUM 9.3 12/02/2023     (L) 12/02/2023    K 4.2 12/02/2023    CO2 22.0 12/02/2023    CL 99 12/02/2023    BUN 19 12/02/2023    CREATININE 1.29 (H) 12/02/2023     Estimated Creatinine Clearance: 58.6 mL/min (A) (by C-G formula based on SCr of 1.29 mg/dL (H)).  Lab Results   Component Value Date    ALT 11 12/02/2023    AST 17 12/02/2023    ALKPHOS 77 12/02/2023    BILITOT 1.4 (H) 12/02/2023     No results found for: \"CRP\"  No results found for: \"SEDRATE\"    The above labs were reviewed.     Microbiology:  Microbiology Results (last 10 days)       Procedure Component Value - Date/Time    Urine Culture - Urine, Urine, Clean Catch [[id number]]  (Normal) Collected: 12/01/23 1329    Lab Status: Final result Specimen: Urine, Clean Catch Updated: 12/02/23 1225     Urine Culture No growth    Blood Culture - Blood, Hand, Right [[id number]]  (Normal) Collected: 12/01/23 1150    Lab Status: Preliminary result Specimen: Blood from Hand, Right Updated: 12/02/23 1216     Blood Culture No growth at 24 hours    Blood Culture - Blood, Arm, Right [[id number]]  (Abnormal) Collected: 12/01/23 1145    Lab Status: Preliminary result Specimen: Blood from Arm, Right Updated: 12/02/23 0712     Blood Culture Abnormal Stain     Gram Stain Aerobic Bottle Gram negative bacilli    Blood Culture - Blood, Arm, Right [[id number]]  (Normal) Collected: 11/30/23 1452    Lab Status: Preliminary result Specimen: Blood from Arm, Right Updated: 12/01/23 1515     Blood Culture No growth at 24 hours    Blood Culture - Blood, Arm, Left [[id number]]  (Abnormal) Collected: 11/30/23 1253    Lab Status: Preliminary result Specimen: Blood from Arm, Left Updated: 12/02/23 0629     Blood Culture Gram Negative Bacilli     Isolated from Aerobic Bottle     Gram Stain Aerobic Bottle Gram negative bacilli    Blood " Culture ID, PCR - Blood, Arm, Left [[id number]]  (Abnormal) Collected: 11/30/23 1253    Lab Status: Final result Specimen: Blood from Arm, Left Updated: 12/01/23 0656     BCID, PCR Escherichia coli. Identification by BCID2 PCR.     BCID, PCR 2 CTX-M (ESBL) detected. Identification by BCID2 PCR     BOTTLE TYPE Aerobic Bottle    Urine Culture - Urine, Urine, Clean Catch [[id number]] Collected: 11/30/23 1243    Lab Status: Final result Specimen: Urine, Clean Catch Updated: 12/02/23 1233     Urine Culture >100,000 CFU/mL Mixed Yoana Isolated    Narrative:      Specimen contains mixed organisms of questionable pathogenicity suggestive of contamination. If symptoms persist, suggest recollection.  Colonization of the urinary tract without infection is common. Treatment is discouraged unless the patient is symptomatic, pregnant, or undergoing an invasive urologic procedure.            Radiology:  XR Chest 1 View    Result Date: 12/1/2023  Impression: Low lung volumes with hypoventilatory change including bibasilar atelectasis. No focal consolidation. Electronically Signed: Jadiel Teran MD  12/1/2023 11:38 AM EST  Workstation ID: [id number]    CT Abdomen Pelvis With Contrast    Result Date: 11/30/2023  Impression: 1.Imaging features are suggestive of left-sided pyelonephritis without evidence of obstruction. Correlate with symptoms and lab values. 2.Interval likely chronic T11 compression fracture. Electronically Signed: Meng Browne MD  11/30/2023 4:23 PM CST  Workstation ID: [id number]     I personally reviewed the above CT: Left-sided renal swelling      Assessment   ESBL E. coli bacteremia and pyelonephritis: 12/1 blood cx still positive, but clinically improving with improved fever curve and WBC  Fever, sepsis due to above: improving  Neutrophilic leukocytosis: improving  Acute kidney injury: improving  Loose stools: Patient says he has chronic loose stools at baseline.  Possibly increased overnight.  Patient also received  stool softener last night  Hyponatremia  History of renal stones but none identified on CT imaging  Possible history of prostatomegaly, but no history of prostatitis: No abnormality noted on CT scan  Listed history of allergy to Zosyn in 2017, which apparently was related to the time he had a surgical procedure and based on description sounds like he had more of a contact dermatitis from some sort of cleaning solution used in the OR rather than no systemic reaction to the antibiotic.  Patient says he has tolerated amoxicillin since then, so allergy disproved    PLAN:   - Microbiology data reviewed.  Follow-up pending sensitivity data for E. coli as well as repeat blood cultures   - Follow CBC, CMP    Recommend not screening for C. difficile colitis in setting of recent laxatives/stool softeners.  Discussed with RN.  Discontinue laxatives and monitor for worsening signs/symptoms over the next 24 to 48 hours.  Will also increase probiotic to twice daily    - Continue Ertapenem 1 g daily.    - Probiotic while on antibiotics    Planning for IV antibiotics for at least 7 to 10 days.  Discussed possibly daily infusion at [facility] once otherwise ready for discharge if he will be staying locally.  Patient has staying with his sister, who lives in [place], but also says that his PCP in [place] can provide daily IV antibiotics through his office.     Discussed with RN. I will follow over the weekend    Sancho Min MD  12/2/2023

## 2023-12-02 NOTE — PLAN OF CARE
Goal Outcome Evaluation:              Outcome Evaluation: Clinical swallowing assessment completed.  Concerns appear to be esophageal. Encouraged pt to follow up with GI (pt sees Dr. Pino) if c/o persist/worsen.  Pt verbalizes understanding.      Anticipated Discharge Disposition (SLP): No further SLP services warranted

## 2023-12-02 NOTE — PLAN OF CARE
Problem: Adult Inpatient Plan of Care  Goal: Absence of Hospital-Acquired Illness or Injury  Intervention: Prevent and Manage VTE (Venous Thromboembolism) Risk  Recent Flowsheet Documentation  Taken 12/2/2023 0400 by Chantelle Castañeda RN  Activity Management: activity encouraged  Taken 12/2/2023 0200 by Chantelle Castañeda RN  Activity Management: activity encouraged  Taken 12/2/2023 0000 by Chantelle Castañeda RN  Activity Management: activity encouraged  Taken 12/1/2023 2200 by Chantelle Castañeda RN  Activity Management: activity encouraged  Taken 12/1/2023 2000 by Chantelle Castañeda RN  Activity Management: activity encouraged  VTE Prevention/Management: (see mar) other (see comments)  Range of Motion: active ROM (range of motion) encouraged     Problem: Adult Inpatient Plan of Care  Goal: Absence of Hospital-Acquired Illness or Injury  Intervention: Prevent Infection  Recent Flowsheet Documentation  Taken 12/2/2023 0400 by Chantelle Castañeda RN  Infection Prevention:   environmental surveillance performed   rest/sleep promoted  Taken 12/2/2023 0200 by Chantelle Castañeda RN  Infection Prevention:   environmental surveillance performed   rest/sleep promoted  Taken 12/2/2023 0000 by Chantelle Castañeda RN  Infection Prevention:   environmental surveillance performed   rest/sleep promoted  Taken 12/1/2023 2200 by Chantelle Castañeda RN  Infection Prevention:   environmental surveillance performed   rest/sleep promoted  Taken 12/1/2023 2000 by Chantelle Castañeda RN  Infection Prevention:   environmental surveillance performed   hand hygiene promoted   rest/sleep promoted   Goal Outcome Evaluation:   Reviewed with patient and ongoing

## 2023-12-02 NOTE — PROGRESS NOTES
[facility] Medicine Services  PROGRESS NOTE    Patient Name: Antonino Barrera  : 1955  MRN: [id number]    Date of Admission: 2023  Primary Care Physician: Provider, No Known    Subjective   Subjective     CC:  Rigors    HPI:  Patient resting in bedside chair, stable. States he still doesn't feel back to baseline but is feeling better than he did on presentation. Vitals stable. Patient coughing after eating- states this happens intermittently at home. Patient had a couple of episodes of diarrhea this morning, which he states is normal for him.      Objective   Objective     Vital Signs:   Temp:  [98 °F (36.7 °C)-102.9 °F (39.4 °C)] 98.9 °F (37.2 °C)  Heart Rate:  [] 83  Resp:  [18-24] 18  BP: (115-142)/(63-84) 128/71     Physical Exam:  Constitutional: No acute distress, awake, alert  HENT: NCAT, mucous membranes moist  Respiratory: Clear to auscultation bilaterally, respiratory effort normal   Cardiovascular: RRR, no murmurs, rubs, or gallops  Gastrointestinal: Positive bowel sounds, soft, mildly diffusely tender, nondistended  Musculoskeletal: No bilateral ankle edema  Psychiatric: Appropriate affect, cooperative  Neurologic: Oriented x 3, strength symmetric in all extremities, Cranial Nerves grossly intact to confrontation, speech clear  Skin: No rashes    Results Reviewed:  LAB RESULTS:      Lab 23  03423  11523  1253   WBC 11.59* 15.87* 19.33*   HEMOGLOBIN 12.1* 12.8* 14.5   HEMATOCRIT 35.4* 36.6* 42.8   PLATELETS 164 153 194   NEUTROS ABS 9.20* 13.44* 16.55*   IMMATURE GRANS (ABS) 0.11* 0.27* 0.23*   LYMPHS ABS 0.69* 0.59* 0.64*   MONOS ABS 1.36* 1.53* 1.84*   EOS ABS 0.19 0.01 0.01   MCV 89.2 88.8 88.2   PROCALCITONIN  --  10.41*  --    LACTATE  --  1.2 1.6         Lab 23  03423  1152 23  1253   SODIUM 131* 126* 132*   POTASSIUM 4.2 3.0* 3.3*   CHLORIDE 99 94* 97*   CO2 22.0 21.0* 22.0   ANION GAP 10.0 11.0 13.0   BUN 19 20 18    CREATININE 1.29* 1.38* 1.37*   EGFR 60.0* 55.4* 55.8*   GLUCOSE 81 116* 144*   CALCIUM 9.3 8.8 9.2         Lab 12/02/23  0349 12/01/23  1152 11/30/23  1253   TOTAL PROTEIN 5.6* 5.8* 6.6   ALBUMIN 3.1* 3.2* 3.8   GLOBULIN 2.5 2.6 2.8   ALT (SGPT) 11 15 19   AST (SGOT) 17 17 24   BILIRUBIN 1.4* 1.5* 2.1*   ALK PHOS 77 76 92   LIPASE  --   --  13         Lab 12/01/23  1152   HSTROP T 17                 Brief Urine Lab Results  (Last result in the past 365 days)        Color   Clarity   Blood   Leuk Est   Nitrite   Protein   CREAT   Urine HCG        12/01/23 1329 Yellow   Clear   Small (1+)   Small (1+)   Negative   100 mg/dL (2+)                   Microbiology Results Abnormal       None            XR Chest 1 View    Result Date: 12/1/2023  XR CHEST 1 VW Date of Exam: 12/1/2023 11:11 AM EST Indication: urosepsis Comparison: None available. Findings: Cardiomediastinal silhouette is borderline prominent, as imaged on this portable radiograph. Low lung volumes with hypoventilatory change including bibasilar atelectasis. No pleural effusion or pneumothorax. Osseous structures are unremarkable.     Impression: Impression: Low lung volumes with hypoventilatory change including bibasilar atelectasis. No focal consolidation. Electronically Signed: Jadiel Teran MD  12/1/2023 11:38 AM EST  Workstation ID: [id number]    CT Abdomen Pelvis With Contrast    Result Date: 11/30/2023  CT ABDOMEN PELVIS W CONTRAST Date of Exam: 11/30/2023 3:44 PM CST Indication: right flank pain fever , chills. Comparison: 2/18/2017 Technique: Axial CT images were obtained of the abdomen and pelvis following the uneventful intravenous administration of 95 cc Isovue-300. Reconstructed coronal and sagittal images were also obtained. Automated exposure control and iterative construction methods were used. Findings: LUNG BASES:  Unremarkable without mass or infiltrate. LIVER:  Unremarkable parenchyma without focal lesion. BILIARY/GALLBLADDER:  Cholecystectomy SPLEEN:  Unremarkable PANCREAS:  Unremarkable ADRENAL:  Unremarkable KIDNEYS: Heterogeneous cortical enhancement involving the left kidney with perinephric and periureteral stranding. No evidence of obstruction. Imaging features suggestive of pyelonephritis. Right kidney is unremarkable in appearance. No calculus identified. GASTROINTESTINAL/MESENTERY:  No evidence of obstruction nor inflammation.  The appendix is normal. MESENTERIC VESSELS:  Patent. AORTA/IVC:  Normal caliber. RETROPERITONEUM/LYMPH NODES:  Unremarkable REPRODUCTIVE:  Unremarkable BLADDER:  Unremarkable OSSEUS STRUCTURES: Interval likely chronic T11 compression fracture.     Impression: Impression: 1.Imaging features are suggestive of left-sided pyelonephritis without evidence of obstruction. Correlate with symptoms and lab values. 2.Interval likely chronic T11 compression fracture. Electronically Signed: Meng Browne MD  11/30/2023 4:23 PM CST  Workstation ID: [id number]         Current medications:  Scheduled Meds:allopurinol, 100 mg, Oral, Daily  cetirizine, 10 mg, Oral, Daily  colchicine, 0.6 mg, Oral, Daily  ertapenem, 1,000 mg, Intravenous, Q24H  famotidine, 20 mg, Oral, BID  heparin (porcine), 5,000 Units, Subcutaneous, Q8H  insulin lispro, 2-7 Units, Subcutaneous, 4x Daily AC & at Bedtime  lactobacillus acidophilus, 1 capsule, Oral, Daily  pantoprazole, 40 mg, Oral, Q AM  senna-docusate sodium, 2 tablet, Oral, BID  sodium chloride, 10 mL, Intravenous, Q12H  sucralfate, 1 g, Oral, 4x Daily      Continuous Infusions:   PRN Meds:.  acetaminophen    senna-docusate sodium **AND** polyethylene glycol **AND** bisacodyl **AND** bisacodyl    Calcium Replacement - Follow Nurse / BPA Driven Protocol    dextrose    dextrose    glucagon (human recombinant)    Magnesium Standard Dose Replacement - Follow Nurse / BPA Driven Protocol    ondansetron **OR** ondansetron    Phosphorus Replacement - Follow Nurse / BPA Driven Protocol    Potassium  Replacement - Follow Nurse / BPA Driven Protocol    prochlorperazine **OR** prochlorperazine **OR** prochlorperazine    [COMPLETED] Insert Peripheral IV **AND** sodium chloride    sodium chloride    sodium chloride    Assessment & Plan   Assessment & Plan     Active Hospital Problems    Diagnosis  POA    **Bacteremia [R78.81]  Yes      Resolved Hospital Problems   No resolved problems to display.        Brief Hospital Course to date:  Antonino Barrera is a 68 y.o. male with a past medical history of gout, HTN and DM2 that presented to the ED complaining of fever/chills and sweats with rigors.     This patient's problems and plans were partially entered by my partner and updated as appropriate by me 12/02/23.    All problems are new to me today.    Sepsis  ESBL E. Coli BActeremia  Pyelonephritis  -ID following  - continue Ertapenem 1gm IV daily- will need IV abx 7-10 days  - continue probiotic   - BC + for E Coli  - urine culture pending     Hyponatremia  - 126 on admission  - improved to 131 this am post IVF  - continue to monitor      Rigors/fevers/chills  - related to sepsis  - prn Tylenol     Leukocytosis  - 15 on admission- improved to 11 this am  - monitor    Dysphagia?  - coughing after eating  - SLP eval    Diarrhea  - ongoing   - chronic per patient.  - GI PCR and C. Difficile pending     Gout  - continue home meds     Hx of HTN  -monitor BP     DM2  -SSI  - hold home PO meds    Expected Discharge Location and Transportation: home  Expected Discharge   Expected Discharge Date: 12/5/2023; Expected Discharge Time:      DVT prophylaxis:  Medical DVT prophylaxis orders are present.     AM-PAC 6 Clicks Score (PT): 22 (12/01/23 2000)    CODE STATUS:   Code Status and Medical Interventions:   Ordered at: 12/01/23 1328     Code Status (Patient has no pulse and is not breathing):    CPR (Attempt to Resuscitate)     Medical Interventions (Patient has pulse or is breathing):    Full Support       Linda Onofre  DO  12/02/23

## 2023-12-02 NOTE — THERAPY EVALUATION
Patient Name: Antonino Barrera  : 1955    MRN: [id number]                              Today's Date: 2023       Admit Date: 2023    Visit Dx:     ICD-10-CM ICD-9-CM   1. Pyelonephritis  N12 590.80   2. Sepsis, due to unspecified organism, unspecified whether acute organ dysfunction present  A41.9 038.9     995.91   3. Fever in adult  R50.9 780.60     Patient Active Problem List   Diagnosis    Right ureteral stone    Right upper quadrant abdominal pain    HTN (hypertension)    Gall stones    Cholecystitis    Hypotension    Hematoma/Post Op bleeding    Anemia, ABL from surgery + dilutional    Bacteremia     Past Medical History:   Diagnosis Date    DVT of leg (deep venous thrombosis)     over 10 years ago after trauma to left leg     Hypertension     Kidney stone     Meniere disease      Past Surgical History:   Procedure Laterality Date    CHOLECYSTECTOMY N/A 2/15/2017    Procedure: CHOLECYSTECTOMY LAPAROSCOPIC ,UMBILICAL HERNIA REPAIR ;  Surgeon: Ryan Corona MD;  Location:  [facility];  Service:     CHOLECYSTECTOMY N/A 2017    Procedure: LAPRASCOPIC EVACUATION OF ABD HEMATOMA;  Surgeon: Ryan Corona MD;  Location:  [facility];  Service:     KNEE SURGERY        General Information       Row Name 23 1531          Physical Therapy Time and Intention    Document Type evaluation  -     Mode of Treatment physical therapy  -       Row Name 23 1531          General Information    Patient Profile Reviewed yes  -     Prior Level of Function independent:;all household mobility;community mobility;gait;bed mobility;ADL's;dressing;bathing;driving  No AD use at baseline. Endorses 1 recent fall d/t dizziness.  -     Existing Precautions/Restrictions fall;other (see comments)  Cdiff (rule out)  -     Barriers to Rehab none identified  -       Row Name 23 1531          Living Environment    People in Home alone  -       Row Name 23 1531          Home Main Entrance     Number of Stairs, Main Entrance two  -     Stair Railings, Main Entrance railings on both sides of stairs  -[facility] Name 12/02/23 1531          Stairs Within Home, Primary    Number of Stairs, Within Home, Primary none  -[facility] Name 12/02/23 1531          Cognition    Orientation Status (Cognition) oriented x 3  -[facility] Name 12/02/23 1531          Safety Issues, Functional Mobility    Safety Issues Affecting Function (Mobility) awareness of need for assistance;insight into deficits/self-awareness;safety precaution awareness;safety precautions follow-through/compliance;judgment  -     Impairments Affecting Function (Mobility) balance;endurance/activity tolerance;pain;shortness of breath;strength  -               User Key  (r) = Recorded By, (t) = Taken By, (c) = Cosigned By      Initials Name Provider Type     Lianne Madrid PT Physical Therapist                   Mobility       [facility] Name 12/02/23 1533          Bed Mobility    Bed Mobility supine-sit  -     Supine-Sit Martin (Bed Mobility) standby assist;verbal cues  -     Assistive Device (Bed Mobility) bed rails;head of bed elevated  -     Comment, (Bed Mobility) VCs for hand placement and sequencing. Cues for log roll technique. Requires increased time d/t pain  -[facility] Name 12/02/23 1533          Transfers    Comment, (Transfers) VCs for hand placement and sequencing  -LH       Row Name 12/02/23 1533          Sit-Stand Transfer    Sit-Stand Martin (Transfers) contact guard;verbal cues  -     Comment, (Sit-Stand Transfer) STS x1 from EOB. Denied dizziness in standing  -[facility] Name 12/02/23 1533          Gait/Stairs (Locomotion)    Martin Level (Gait) contact guard;verbal cues  -     Distance in Feet (Gait) 20  -     Deviations/Abnormal Patterns (Gait) bilateral deviations;roxanna decreased;gait speed decreased;festinating/shuffling;stride length decreased;weight shifting decreased  -      Bilateral Gait Deviations forward flexed posture;heel strike decreased  -     Comment, (Gait/Stairs) Pt demo step through gait pattern w/ short, shuffled steps and narrow MARTINA. Pt declined FWW use despite encouragement. VCs for sequencing and upright posture. Pt mildly unsteady but no overt LOB noted. SOA noted, which pt reports is baseline. Distance limited by weakness and fatigue  -               User Key  (r) = Recorded By, (t) = Taken By, (c) = Cosigned By      Initials Name Provider Type     Lianne Madrid PT Physical Therapist                   Obj/Interventions       Row Name 12/02/23 1537          Range of Motion Comprehensive    General Range of Motion bilateral lower extremity ROM WFL  -       Row Name 12/02/23 1537          Strength Comprehensive (MMT)    General Manual Muscle Testing (MMT) Assessment lower extremity strength deficits identified  -     Comment, General Manual Muscle Testing (MMT) Assessment B LE grossly 4+/5  -       Row Name 12/02/23 1537          Balance    Balance Assessment sitting static balance;sitting dynamic balance;sit to stand dynamic balance;standing static balance;standing dynamic balance  -     Static Sitting Balance independent  -     Dynamic Sitting Balance supervision  -     Position, Sitting Balance unsupported;sitting edge of bed  -     Sit to Stand Dynamic Balance contact guard;verbal cues  -     Static Standing Balance standby assist  -     Dynamic Standing Balance contact guard  -     Position/Device Used, Standing Balance unsupported  -     Comment, Balance Mildly unsteady during ambulation. No overt LOB  -       Row Name 12/02/23 1537          Sensory Assessment (Somatosensory)    Sensory Assessment (Somatosensory) LE sensation intact  -               User Key  (r) = Recorded By, (t) = Taken By, (c) = Cosigned By      Initials Name Provider Type     Lianne Madrid PT Physical Therapist                   Goals/Plan       Row Name  12/02/23 1542          Bed Mobility Goal 1 (PT)    Activity/Assistive Device (Bed Mobility Goal 1, PT) sit to supine/supine to sit  -     [place] Level/Cues Needed (Bed Mobility Goal 1, PT) independent  -     Time Frame (Bed Mobility Goal 1, PT) long term goal (LTG);10 days  -[facility] Name 12/02/23 1542          Transfer Goal 1 (PT)    Activity/Assistive Device (Transfer Goal 1, PT) sit-to-stand/stand-to-sit;bed-to-chair/chair-to-bed  -     [place] Level/Cues Needed (Transfer Goal 1, PT) independent  -     Time Frame (Transfer Goal 1, PT) long term goal (LTG);10 days  -[facility] Name 12/02/23 1542          Gait Training Goal 1 (PT)    Activity/Assistive Device (Gait Training Goal 1, PT) gait (walking locomotion);assistive device use  -     [place] Level (Gait Training Goal 1, PT) modified independence  -     Distance (Gait Training Goal 1, PT) 150 ft  -     Time Frame (Gait Training Goal 1, PT) long term goal (LTG);10 days  -[facility] Name 12/02/23 1542          Stairs Goal 1 (PT)    Activity/Assistive Device (Stairs Goal 1, PT) ascending stairs;descending stairs;using handrail, left;using handrail, right  -     [place] Level/Cues Needed (Stairs Goal 1, PT) standby assist  -     Number of Stairs (Stairs Goal 1, PT) 2  -     Time Frame (Stairs Goal 1, PT) long term goal (LTG);10 days  -[facility] Name 12/02/23 1542          Therapy Assessment/Plan (PT)    Planned Therapy Interventions (PT) balance training;bed mobility training;gait training;home exercise program;neuromuscular re-education;patient/family education;postural re-education;ROM (range of motion);stair training;strengthening;stretching;transfer training  -               User Key  (r) = Recorded By, (t) = Taken By, (c) = Cosigned By      Initials Name Provider Type     Lianne Madrid, PT Physical Therapist                   Clinical Impression       Row Name 12/02/23 1539          Pain    Pain  Intervention(s) Repositioned;Ambulation/increased activity  -     Additional Documentation Pain Scale: FACES Pre/Post-Treatment (Group)  -[facility] Name 12/02/23 1539          Pain Scale: FACES Pre/Post-Treatment    Pain: FACES Scale, Pretreatment 0-->no hurt  -     Posttreatment Pain Rating 2-->hurts little bit  -     Pain Location - Side/Orientation Right  -     Pain Location --  -     Pain Location - hand  carpal tunnel  -     Pre/Posttreatment Pain Comment tolerated  -[facility] Name 12/02/23 1539          Plan of Care Review    Plan of Care Reviewed With patient  -     Outcome Evaluation PT eval completed. Pt presents below baseline function d/t generalized weakness, gait instability, and decreased activity tolerance. Pt required CGA for STS and to ambulate 20 ft unsupported. He was mildly unsteady during ambulation and declined FWW use despite encouragement. Pt would benefit from skilled IP PT. Recommend home w/ assist and HH PT at d/c.  -[facility] Name 12/02/23 153          Therapy Assessment/Plan (PT)    Patient/Family Therapy Goals Statement (PT) to go home  -     Rehab Potential (PT) good, to achieve stated therapy goals  [facility]     Criteria for Skilled Interventions Met (PT) yes;meets criteria;skilled treatment is necessary  -     Therapy Frequency (PT) daily  -[facility] Name 12/02/23 1533          Vital Signs    Pre Systolic BP Rehab 141  -     Pre Treatment Diastolic BP 85  -     Pretreatment Heart Rate (beats/min) 100  -     Posttreatment Heart Rate (beats/min) 87  -     Pre SpO2 (%) 95  -     O2 Delivery Pre Treatment room air  -     O2 Delivery Intra Treatment room air  -     Post SpO2 (%) 94  -     O2 Delivery Post Treatment room air  -     Pre Patient Position Supine  -     Intra Patient Position Standing  -     Post Patient Position Sitting  -[facility] Name 12/02/23 1532          Positioning and Restraints    Pre-Treatment Position in bed  -      Post Treatment Position chair  -     In Chair sitting;call light within reach;encouraged to call for assist;exit alarm on;with SLP;waffle cushion;notified [facility]  -               User Key  (r) = Recorded By, (t) = Taken By, (c) = Cosigned By      Initials Name Provider Type     Lianne Madrid, LONG Physical Therapist                   Outcome Measures       Row Name 12/02/23 1543 12/02/23 0800       How much help from another person do you currently need...    Turning from your back to your side while in flat bed without using bedrails? 4  - 4  -SM    Moving from lying on back to sitting on the side of a flat bed without bedrails? 3  - 4  -SM    Moving to and from a bed to a chair (including a wheelchair)? 3  - 4  -SM    Standing up from a chair using your arms (e.g., wheelchair, bedside chair)? 3  - 3  -SM    Climbing 3-5 steps with a railing? 3  - 3  -SM    To walk in hospital room? 3  - 4  -SM    AM-PAC 6 Clicks Score (PT) 19  - 22  -    Highest Level of Mobility Goal 6 --> Walk 10 steps or more  - 7 --> Walk 25 feet or more  -      Row Name 12/02/23 1543          Functional Assessment    Outcome Measure Options AM-PAC 6 Clicks Basic Mobility (PT)  -               User Key  (r) = Recorded By, (t) = Taken By, (c) = Cosigned By      Initials Name Provider Type    Sheela Porter RN Registered Nurse     Lianne Madrid, PT Physical Therapist                                 Physical Therapy Education       Title: PT OT SLP Therapies (In Progress)       Topic: Physical Therapy (In Progress)       Point: Mobility training (Done)       Learning Progress Summary             Patient Acceptance, E, VU,NR by  at 12/2/2023 1543                         Point: Home exercise program (Not Started)       Learner Progress:  Not documented in this visit.              Point: Body mechanics (Done)       Learning Progress Summary             Patient Acceptance, E, VU,NR by  at 12/2/2023 1543                          Point: Precautions (Done)       Learning Progress Summary             Patient Acceptance, E, VU,NR by  at 12/2/2023 1543                                         User Key       Initials Effective Dates Name Provider Type Discipline     09/21/23 -  Lianne Madrid, PT Physical Therapist PT                  PT Recommendation and Plan  Planned Therapy Interventions (PT): balance training, bed mobility training, gait training, home exercise program, neuromuscular re-education, patient/family education, postural re-education, ROM (range of motion), stair training, strengthening, stretching, transfer training  Plan of Care Reviewed With: patient  Outcome Evaluation: PT eval completed. Pt presents below baseline function d/t generalized weakness, gait instability, and decreased activity tolerance. Pt required CGA for STS and to ambulate 20 ft unsupported. He was mildly unsteady during ambulation and declined FWW use despite encouragement. Pt would benefit from skilled IP PT. Recommend home w/ assist and HH PT at d/c.     Time Calculation:   PT Evaluation Complexity  History, PT Evaluation Complexity: 1-2 personal factors and/or comorbidities  Examination of Body Systems (PT Eval Complexity): total of 4 or more elements  Clinical Presentation (PT Evaluation Complexity): evolving  Clinical Decision Making (PT Evaluation Complexity): low complexity  Overall Complexity (PT Evaluation Complexity): low complexity     PT Charges       Row Name 12/02/23 1544             Time Calculation    Start Time 1430  -      PT Received On 12/02/23  -      PT Goal Re-Cert Due Date 12/12/23  -         Timed Charges    42774 - PT Therapeutic Activity Minutes 10  -         Untimed Charges    PT Eval/Re-eval Minutes 31  -         Total Minutes    Timed Charges Total Minutes 10  -      Untimed Charges Total Minutes 31  -       Total Minutes 41  -                User Key  (r) = Recorded By, (t) = Taken By, (c) =  Cosigned By      Initials Name Provider Type     Lianne Madrid, PT Physical Therapist                  Therapy Charges for Today       Code Description Service Date Service Provider Modifiers Qty    22957130453 HC PT THERAPEUTIC ACT EA 15 MIN 12/2/2023 Lianne Madrid, PT GP 1    43233901376 HC PT EVAL LOW COMPLEXITY 3 12/2/2023 Lianne Madrid, PT GP 1            PT G-Codes  Outcome Measure Options: AM-PAC 6 Clicks Basic Mobility (PT)  AM-PAC 6 Clicks Score (PT): 19  PT Discharge Summary  Anticipated Discharge Disposition (PT): home with assist, home with home health    Lianne Madrid, LONG  12/2/2023

## 2023-12-02 NOTE — THERAPY EVALUATION
Acute Care - Speech Language Pathology   Swallow Initial Evaluation [facility]  Clinical Swallow Evaluation      Patient Name: Antonino Barrera  : 1955  MRN: [id number]  Today's Date: 2023               Admit Date: 2023    Visit Dx:     ICD-10-CM ICD-9-CM   1. Pyelonephritis  N12 590.80   2. Sepsis, due to unspecified organism, unspecified whether acute organ dysfunction present  A41.9 038.9     995.91   3. Fever in adult  R50.9 780.60     Patient Active Problem List   Diagnosis    Right ureteral stone    Right upper quadrant abdominal pain    HTN (hypertension)    Gall stones    Cholecystitis    Hypotension    Hematoma/Post Op bleeding    Anemia, ABL from surgery + dilutional    Bacteremia     Past Medical History:   Diagnosis Date    DVT of leg (deep venous thrombosis)     over 10 years ago after trauma to left leg     Hypertension     Kidney stone     Meniere disease      Past Surgical History:   Procedure Laterality Date    CHOLECYSTECTOMY N/A 2/15/2017    Procedure: CHOLECYSTECTOMY LAPAROSCOPIC ,UMBILICAL HERNIA REPAIR ;  Surgeon: Ryan Corona MD;  Location: [facility] OR;  Service:     CHOLECYSTECTOMY N/A 2017    Procedure: LAPRASCOPIC EVACUATION OF ABD HEMATOMA;  Surgeon: Ryan Corona MD;  Location:  [facility];  Service:     KNEE SURGERY         SLP Recommendation and Plan  SLP Swallowing Diagnosis: functional oral phase, functional pharyngeal phase, suspected esophageal dysphagia (23)  SLP Diet Recommendation: regular textures, thin liquids (23)  Recommended Precautions and Strategies: upright posture during/after eating (23)  SLP Rec. for Method of Medication Administration: meds whole, meds crushed, as tolerated (23)     Monitor for Signs of Aspiration: notify SLP if any concerns (23)  Recommended Diagnostics: No further SLP services recommended (23)  Swallow Criteria for Skilled Therapeutic Interventions Met:  no problems identified which require skilled intervention, other (see comments) (Follow up with GI if esophageal c/o persist or worsen or result in decreased PO intake.) (12/02/23 1415)  Anticipated Discharge Disposition (SLP): No further SLP services warranted (12/02/23 1415)     Therapy Frequency (Swallow): evaluation only (12/02/23 1415)     Oral Care Recommendations: Oral Care BID/PRN, Toothbrush (12/02/23 1415)  Demonstrates Need for Referral to Another Service: gastroenterology (f/u with GI with persistent esophageal c/o) (12/02/23 1415)                                   Oral Care Recommendations: Oral Care BID/PRN, Toothbrush (12/02/23 1415)    Outcome Evaluation: Clinical swallowing assessment completed.  Concerns appear to be esophageal. Encouraged pt to follow up with GI (pt sees Dr. Pino) if c/o persist/worsen.  Pt verbalizes understanding.      SWALLOW EVALUATION (last 72 hours)       SLP Adult Swallow Evaluation       Row Name 12/02/23 1415                   Rehab Evaluation    Document Type evaluation  -ML        Subjective Information complains of  solid dysphagia  -ML        Patient Observations alert;cooperative  -ML        Patient Effort excellent  -ML           General Information    Patient Profile Reviewed yes  -ML        Pertinent History Of Current Problem Pt admitted with Bacteremia- painful urination.  Today reported dysphagia to MD.  -ML        Current Method of Nutrition regular textures;thin liquids  -ML        Precautions/Limitations, Vision WFL;for purposes of eval  -ML        Precautions/Limitations, Hearing WFL;for purposes of eval  -ML        Prior Level of Function-Communication WFL  -ML        Prior Level of Function-Swallowing esophageal concerns  -ML        Plans/Goals Discussed with patient;agreed upon  -ML        Patient's Goals for Discharge return home  -ML           Pain    Additional Documentation Pain Scale: Numbers Pre/Post-Treatment (Group)  -ML           Pain  Scale: Numbers Pre/Post-Treatment    Pretreatment Pain Rating 2/10  -ML        Posttreatment Pain Rating 2/10  -ML        Pain Location - knee  -ML           Oral Motor Structure and Function    Dentition Assessment missing teeth  -ML        Secretion Management WNL/WFL  -ML        Mucosal Quality moist, healthy  -ML        Volitional Swallow WFL  -ML           Oral Musculature and Cranial Nerve Assessment    Oral Motor General Assessment WFL  -ML           General Eating/Swallowing Observations    Respiratory Support Currently in Use room air  -ML        Eating/Swallowing Skills self-fed  -ML        Positioning During Eating upright in chair  -ML        Utensils Used spoon;cup;straw  -ML        Consistencies Trialed regular textures;thin liquids;pureed  -ML        Pre SpO2 (%) 94  -ML        Post SpO2 (%) 94  -ML           Clinical Swallow Eval    Oral Prep Phase WFL  -ML        Oral Transit WFL  -ML        Oral Residue WFL  -ML        Pharyngeal Phase WFL  -ML        Esophageal Phase suspected esophageal impairment  -ML        Clinical Swallow Evaluation Summary Clinical swallowing assessment completed. Pt reports dysphagia with solids with the first bite or two and then resolves. Denies dysphagia with liquids.  Reports his mother had esophageal dysphagia c/o and received esophageal dilitations.  Today, pt is sitting upright in the chair post PT.  He is able to self feed. Decreased denttition, however pt is able to manage regular solids/thin liquids without difficulty. No pharyngeal signs with any consistency. Pt describes esophageal c/o, however no difficulty reported or appreciated today.  Discussed foods likely to cause more problems if esophageal- pt reports sandwiches, meat, dry foods are more problematic.  Encouraged pt to follow up with GI (pt sees Dr. Pino) should c/o worsen/persist.  -ML           Esophageal Phase Concerns    Esophageal Phase Concerns sensation of material sticking  -ML         Sensation of Material Sticking regular consistencies  -ML        Esophageal Phase Concerns, Comment Pt reports family hx of esophageal dysphagia with mother receiving dilations.  -ML           SLP Evaluation Clinical Impression    SLP Swallowing Diagnosis functional oral phase;functional pharyngeal phase;suspected esophageal dysphagia  -ML        Functional Impact risk of malnutrition  -ML        Swallow Criteria for Skilled Therapeutic Interventions Met no problems identified which require skilled intervention;other (see comments)  Follow up with GI if esophageal c/o persist or worsen or result in decreased PO intake.  -ML           Recommendations    Therapy Frequency (Swallow) evaluation only  -ML        SLP Diet Recommendation regular textures;thin liquids  -ML        Recommended Diagnostics No further SLP services recommended  -ML        Recommended Precautions and Strategies upright posture during/after eating  -ML        Oral Care Recommendations Oral Care BID/PRN;Toothbrush  -ML        SLP Rec. for Method of Medication Administration meds whole;meds crushed;as tolerated  -ML        Monitor for Signs of Aspiration notify SLP if any concerns  -ML        Anticipated Discharge Disposition (SLP) No further SLP services warranted  -ML        Demonstrates Need for Referral to Another Service gastroenterology  f/u with GI with persistent esophageal c/o  -ML                  User Key  (r) = Recorded By, (t) = Taken By, (c) = Cosigned By      Initials Name Effective Dates    ML Khalida Duenas, CCC-SLP 06/16/21 -                     EDUCATION  The patient has been educated in the following areas:   Dysphagia (Swallowing Impairment).              Time Calculation:    Time Calculation- SLP       Row Name 12/02/23 0604             Time Calculation- SLP    SLP Start Time 1415  -ML      SLP Received On 12/02/23  -ML                User Key  (r) = Recorded By, (t) = Taken By, (c) = Cosigned By      Initials Name  Provider Type     Khalida Duenas, CCC-SLP Speech and Language Pathologist                    Therapy Charges for Today       Code Description Service Date Service Provider Modifiers Qty    74241624167  ST EVAL ORAL PHARYNG SWALLOW 3 12/2/2023 Khalida Duenas, NEIL-SLP GN 1                 LANNY Elizabeth  12/2/2023

## 2023-12-02 NOTE — PLAN OF CARE
Goal Outcome Evaluation:  Plan of Care Reviewed With: patient        Progress: no change  Outcome Evaluation: vss, SR on tele,denies pain, up pain chair for several hours and tolerated well

## 2023-12-03 LAB
ALBUMIN SERPL-MCNC: 3 G/DL (ref 3.5–5.2)
ALBUMIN/GLOB SERPL: 1.6 G/DL
ALP SERPL-CCNC: 76 U/L (ref 39–117)
ALT SERPL W P-5'-P-CCNC: 16 U/L (ref 1–41)
ANION GAP SERPL CALCULATED.3IONS-SCNC: 12 MMOL/L (ref 5–15)
AST SERPL-CCNC: 28 U/L (ref 1–40)
BILIRUB SERPL-MCNC: 0.8 MG/DL (ref 0–1.2)
BUN SERPL-MCNC: 16 MG/DL (ref 8–23)
BUN/CREAT SERPL: 13.9 (ref 7–25)
CALCIUM SPEC-SCNC: 9.4 MG/DL (ref 8.6–10.5)
CHLORIDE SERPL-SCNC: 100 MMOL/L (ref 98–107)
CO2 SERPL-SCNC: 19 MMOL/L (ref 22–29)
CREAT SERPL-MCNC: 1.15 MG/DL (ref 0.76–1.27)
DEPRECATED RDW RBC AUTO: 43.4 FL (ref 37–54)
EGFRCR SERPLBLD CKD-EPI 2021: 68.9 ML/MIN/1.73
ERYTHROCYTE [DISTWIDTH] IN BLOOD BY AUTOMATED COUNT: 13.3 % (ref 12.3–15.4)
GLOBULIN UR ELPH-MCNC: 1.9 GM/DL
GLUCOSE BLDC GLUCOMTR-MCNC: 112 MG/DL (ref 70–130)
GLUCOSE BLDC GLUCOMTR-MCNC: 75 MG/DL (ref 70–130)
GLUCOSE BLDC GLUCOMTR-MCNC: 92 MG/DL (ref 70–130)
GLUCOSE BLDC GLUCOMTR-MCNC: 98 MG/DL (ref 70–130)
GLUCOSE SERPL-MCNC: 84 MG/DL (ref 65–99)
HCT VFR BLD AUTO: 35.3 % (ref 37.5–51)
HGB BLD-MCNC: 12.1 G/DL (ref 13–17.7)
MCH RBC QN AUTO: 30.2 PG (ref 26.6–33)
MCHC RBC AUTO-ENTMCNC: 34.3 G/DL (ref 31.5–35.7)
MCV RBC AUTO: 88 FL (ref 79–97)
PLATELET # BLD AUTO: 162 10*3/MM3 (ref 140–450)
PMV BLD AUTO: 10 FL (ref 6–12)
POTASSIUM SERPL-SCNC: 3.7 MMOL/L (ref 3.5–5.2)
PROT SERPL-MCNC: 4.9 G/DL (ref 6–8.5)
RBC # BLD AUTO: 4.01 10*6/MM3 (ref 4.14–5.8)
SODIUM SERPL-SCNC: 131 MMOL/L (ref 136–145)
WBC NRBC COR # BLD AUTO: 7.93 10*3/MM3 (ref 3.4–10.8)

## 2023-12-03 PROCEDURE — 85027 COMPLETE CBC AUTOMATED: CPT | Performed by: HOSPITALIST

## 2023-12-03 PROCEDURE — 25010000002 ERTAPENEM PER 500 MG: Performed by: HOSPITALIST

## 2023-12-03 PROCEDURE — 25010000002 HEPARIN (PORCINE) PER 1000 UNITS: Performed by: HOSPITALIST

## 2023-12-03 PROCEDURE — 99239 HOSP IP/OBS DSCHRG MGMT >30: CPT | Performed by: HOSPITALIST

## 2023-12-03 PROCEDURE — 82948 REAGENT STRIP/BLOOD GLUCOSE: CPT

## 2023-12-03 PROCEDURE — 80053 COMPREHEN METABOLIC PANEL: CPT | Performed by: HOSPITALIST

## 2023-12-03 RX ADMIN — HEPARIN SODIUM 5000 UNITS: 5000 INJECTION INTRAVENOUS; SUBCUTANEOUS at 05:18

## 2023-12-03 RX ADMIN — HEPARIN SODIUM 5000 UNITS: 5000 INJECTION INTRAVENOUS; SUBCUTANEOUS at 13:40

## 2023-12-03 RX ADMIN — FAMOTIDINE 20 MG: 20 TABLET, FILM COATED ORAL at 10:01

## 2023-12-03 RX ADMIN — CETIRIZINE HYDROCHLORIDE 10 MG: 10 TABLET, FILM COATED ORAL at 10:01

## 2023-12-03 RX ADMIN — COLCHICINE 0.6 MG: 0.6 TABLET ORAL at 10:01

## 2023-12-03 RX ADMIN — Medication 10 ML: at 10:02

## 2023-12-03 RX ADMIN — Medication 1 CAPSULE: at 20:57

## 2023-12-03 RX ADMIN — Medication 10 ML: at 20:59

## 2023-12-03 RX ADMIN — ALLOPURINOL 100 MG: 100 TABLET ORAL at 10:01

## 2023-12-03 RX ADMIN — SUCRALFATE 1 G: 1 TABLET ORAL at 17:22

## 2023-12-03 RX ADMIN — HEPARIN SODIUM 5000 UNITS: 5000 INJECTION INTRAVENOUS; SUBCUTANEOUS at 20:58

## 2023-12-03 RX ADMIN — SUCRALFATE 1 G: 1 TABLET ORAL at 10:02

## 2023-12-03 RX ADMIN — FAMOTIDINE 20 MG: 20 TABLET, FILM COATED ORAL at 20:57

## 2023-12-03 RX ADMIN — SUCRALFATE 1 G: 1 TABLET ORAL at 11:58

## 2023-12-03 RX ADMIN — Medication 1 CAPSULE: at 10:01

## 2023-12-03 RX ADMIN — ERTAPENEM 1000 MG: 1 INJECTION INTRAMUSCULAR; INTRAVENOUS at 11:52

## 2023-12-03 RX ADMIN — PANTOPRAZOLE SODIUM 40 MG: 40 TABLET, DELAYED RELEASE ORAL at 05:18

## 2023-12-03 RX ADMIN — SUCRALFATE 1 G: 1 TABLET ORAL at 20:57

## 2023-12-03 NOTE — PLAN OF CARE
Goal Outcome Evaluation:  Plan of Care Reviewed With: patient        Progress: no change  Outcome Evaluation: deies pain, SR on tele, up with assist d/t unsteady gait. vss.

## 2023-12-03 NOTE — DISCHARGE SUMMARY
[facility] Medicine Services  DISCHARGE SUMMARY    Patient Name: Antonino Barrera  : 1955  MRN: [id number]    Date of Admission: 2023 10:58 AM  Date of Discharge:  12/3/2023  Primary Care Physician: Provider, No Known    Consults       Date and Time Order Name Status Description    2023  1:26 PM Inpatient Infectious Diseases Consult Completed             Hospital Course     Presenting Problem: Rigors     Active Hospital Problems    Diagnosis  POA    **Bacteremia [R78.81]  Yes      Resolved Hospital Problems   No resolved problems to display.          Hospital Course:  Antonino Barrera is a 68 y.o. male  is a 68 y.o. male with a past medical history of gout, HTN and DM2 that presented to the ED complaining of fever/chills and sweats with rigors.      This patient's problems and plans were partially entered by my partner and updated as appropriate by me 23.     Sepsis  ESBL E. Coli BActeremia  Pyelonephritis  -ID following  - continue Ertapenem 1gm IV daily x 7-10 days  - f/u with [facility], Dr. Min, tomorrow 23 at 9:30am in their outpatient office   - BC + for ESBL E. Coli  - urine culture negative     Hyponatremia  - 126 on admission  - improved to 131 this am post IVF     Rigors/fevers/chills  - related to sepsis     Leukocytosis  - 15 on admission- improved to 7 this am     Dysphagia?  - coughing after eating  - SLP evaluated and patient passed swallow evaluation     Diarrhea  - chronic per patient.     Gout  - continue home meds     Hx of HTN  - continue home meds     DM2  - continue home meds    Discharge Follow Up Recommendations for outpatient labs/diagnostics:  - continue Ertapenem 1gm IV daily x 7-10 days  - f/u with [facility], Dr. Min, tomorrow 23 at 9:30am in their outpatient office     Day of Discharge     HPI:   Patient stable, feeling much better.     Review of Systems  Gen- No  fevers, chills  CV- No chest pain, palpitations  Resp- No cough, dyspnea  GI- No N/V/D, abd pain    Vital Signs:   Temp:  [98.3 °F (36.8 °C)-99.9 °F (37.7 °C)] 98.3 °F (36.8 °C)  Heart Rate:  [71-96] 84  Resp:  [18-32] 18  BP: (125-150)/(72-84) 130/72      Physical Exam:  Constitutional: No acute distress, awake, alert  HENT: NCAT, mucous membranes moist  Respiratory: Clear to auscultation bilaterally, respiratory effort normal   Cardiovascular: RRR, no murmurs, rubs, or gallops  Gastrointestinal: Positive bowel sounds, soft, mildly diffusely tender, nondistended  Musculoskeletal: No bilateral ankle edema  Psychiatric: Appropriate affect, cooperative  Neurologic: Oriented x 3, strength symmetric in all extremities, Cranial Nerves grossly intact to confrontation, speech clear  Skin: No rashes    Pertinent  and/or Most Recent Results     LAB RESULTS:      Lab 12/03/23 0413 12/02/23 0349 12/01/23  1152 11/30/23  1253   WBC 7.93 11.59* 15.87* 19.33*   HEMOGLOBIN 12.1* 12.1* 12.8* 14.5   HEMATOCRIT 35.3* 35.4* 36.6* 42.8   PLATELETS 162 164 153 194   NEUTROS ABS  --  9.20* 13.44* 16.55*   IMMATURE GRANS (ABS)  --  0.11* 0.27* 0.23*   LYMPHS ABS  --  0.69* 0.59* 0.64*   MONOS ABS  --  1.36* 1.53* 1.84*   EOS ABS  --  0.19 0.01 0.01   MCV 88.0 89.2 88.8 88.2   PROCALCITONIN  --   --  10.41*  --    LACTATE  --   --  1.2 1.6         Lab 12/03/23 0413 12/02/23 0349 12/01/23  1152 11/30/23  1253   SODIUM 131* 131* 126* 132*   POTASSIUM 3.7 4.2 3.0* 3.3*   CHLORIDE 100 99 94* 97*   CO2 19.0* 22.0 21.0* 22.0   ANION GAP 12.0 10.0 11.0 13.0   BUN 16 19 20 18   CREATININE 1.15 1.29* 1.38* 1.37*   EGFR 68.9 60.0* 55.4* 55.8*   GLUCOSE 84 81 116* 144*   CALCIUM 9.4 9.3 8.8 9.2         Lab 12/03/23  0413 12/02/23  0349 12/01/23  1152 11/30/23  1253   TOTAL PROTEIN 4.9* 5.6* 5.8* 6.6   ALBUMIN 3.0* 3.1* 3.2* 3.8   GLOBULIN 1.9 2.5 2.6 2.8   ALT (SGPT) 16 11 15 19   AST (SGOT) 28 17 17 24   BILIRUBIN 0.8 1.4* 1.5* 2.1*   ALK PHOS  76 77 76 92   LIPASE  --   --   --  13         Lab 12/01/23  1152   HSTROP T 17                 Brief Urine Lab Results  (Last result in the past 365 days)        Color   Clarity   Blood   Leuk Est   Nitrite   Protein   CREAT   Urine HCG        12/01/23 1329 Yellow   Clear   Small (1+)   Small (1+)   Negative   100 mg/dL (2+)                 Microbiology Results (last 10 days)       Procedure Component Value - Date/Time    Urine Culture - Urine, Urine, Clean Catch [[id number]]  (Normal) Collected: 12/01/23 1329    Lab Status: Final result Specimen: Urine, Clean Catch Updated: 12/02/23 1225     Urine Culture No growth    Blood Culture - Blood, Hand, Right [[id number]]  (Abnormal) Collected: 12/01/23 1150    Lab Status: Preliminary result Specimen: Blood from Hand, Right Updated: 12/02/23 2108     Blood Culture Abnormal Stain     Gram Stain Aerobic Bottle Gram negative bacilli    Blood Culture - Blood, Arm, Right [[id number]]  (Abnormal) Collected: 12/01/23 1145    Lab Status: Preliminary result Specimen: Blood from Arm, Right Updated: 12/03/23 0617     Blood Culture Escherichia coli ESBL     Comment:   Consider infectious disease consult.  Susceptibility results may not correlate to clinical outcomes.  For ESBL-producing infections in the blood, a carbapenem is recommended as first-line therapy for optimal clinical outcomes.        Isolated from --     Gram Stain Aerobic Bottle Gram negative bacilli    Narrative:      Refer to previous blood culture collected on 11/30/2023 1253 for MICs.    Blood Culture - Blood, Arm, Right [[id number]]  (Normal) Collected: 11/30/23 1452    Lab Status: Preliminary result Specimen: Blood from Arm, Right Updated: 12/02/23 1515     Blood Culture No growth at 2 days    Blood Culture - Blood, Arm, Left [[id number]]  (Abnormal)  (Susceptibility) Collected: 11/30/23 1253    Lab Status: Preliminary result Specimen: Blood from Arm, Left Updated: 12/03/23 0617     Blood Culture Escherichia coli ESBL      Comment:   Consider infectious disease consult.  Susceptibility results may not correlate to clinical outcomes.  For ESBL-producing infections in the blood, a carbapenem is recommended as first-line therapy for optimal clinical outcomes.        Isolated from Aerobic Bottle     Gram Stain Aerobic Bottle Gram negative bacilli    Narrative:      Organism under investigation.      Susceptibility        Escherichia coli ESBL      FLORI (Preliminary)      Ertapenem Susceptible      Meropenem Susceptible                       Susceptibility Comments       Escherichia coli ESBL    Cefazolin sensitivity will not be reported for Enterobacteriaceae in non-urine isolates. If cefazolin is preferred, please call the microbiology lab to request an E-test.  With the exception of urinary-sourced infections, aminoglycosides should not be used as monotherapy.               Blood Culture ID, PCR - Blood, Arm, Left [[id number]]  (Abnormal) Collected: 11/30/23 1253    Lab Status: Final result Specimen: Blood from Arm, Left Updated: 12/01/23 0656     BCID, PCR Escherichia coli. Identification by BCID2 PCR.     BCID, PCR 2 CTX-M (ESBL) detected. Identification by BCID2 PCR     BOTTLE TYPE Aerobic Bottle    Urine Culture - Urine, Urine, Clean Catch [[id number]] Collected: 11/30/23 1243    Lab Status: Final result Specimen: Urine, Clean Catch Updated: 12/02/23 1233     Urine Culture >100,000 CFU/mL Mixed Yoana Isolated    Narrative:      Specimen contains mixed organisms of questionable pathogenicity suggestive of contamination. If symptoms persist, suggest recollection.  Colonization of the urinary tract without infection is common. Treatment is discouraged unless the patient is symptomatic, pregnant, or undergoing an invasive urologic procedure.            XR Chest 1 View    Result Date: 12/1/2023  XR CHEST 1 VW Date of Exam: 12/1/2023 11:11 AM EST Indication: urosepsis Comparison: None available. Findings: Cardiomediastinal silhouette is  borderline prominent, as imaged on this portable radiograph. Low lung volumes with hypoventilatory change including bibasilar atelectasis. No pleural effusion or pneumothorax. Osseous structures are unremarkable.     Impression: Low lung volumes with hypoventilatory change including bibasilar atelectasis. No focal consolidation. Electronically Signed: Jadiel Teran MD  12/1/2023 11:38 AM EST  Workstation ID: [id number]    CT Abdomen Pelvis With Contrast    Result Date: 11/30/2023  CT ABDOMEN PELVIS W CONTRAST Date of Exam: 11/30/2023 3:44 PM CST Indication: right flank pain fever , chills. Comparison: 2/18/2017 Technique: Axial CT images were obtained of the abdomen and pelvis following the uneventful intravenous administration of 95 cc Isovue-300. Reconstructed coronal and sagittal images were also obtained. Automated exposure control and iterative construction methods were used. Findings: LUNG BASES:  Unremarkable without mass or infiltrate. LIVER:  Unremarkable parenchyma without focal lesion. BILIARY/GALLBLADDER: Cholecystectomy SPLEEN:  Unremarkable PANCREAS:  Unremarkable ADRENAL:  Unremarkable KIDNEYS: Heterogeneous cortical enhancement involving the left kidney with perinephric and periureteral stranding. No evidence of obstruction. Imaging features suggestive of pyelonephritis. Right kidney is unremarkable in appearance. No calculus identified. GASTROINTESTINAL/MESENTERY:  No evidence of obstruction nor inflammation.  The appendix is normal. MESENTERIC VESSELS:  Patent. AORTA/IVC:  Normal caliber. RETROPERITONEUM/LYMPH NODES:  Unremarkable REPRODUCTIVE:  Unremarkable BLADDER:  Unremarkable OSSEUS STRUCTURES: Interval likely chronic T11 compression fracture.     Impression: 1.Imaging features are suggestive of left-sided pyelonephritis without evidence of obstruction. Correlate with symptoms and lab values. 2.Interval likely chronic T11 compression fracture. Electronically Signed: Meng Browne MD  11/30/2023  4:23 PM CST  Workstation ID: [id number]                 Plan for Follow-up of Pending Labs/Results: per ID  Pending Labs       Order Current Status    Blood Culture - Blood, Arm, Right Preliminary result    Blood Culture - Blood, Hand, Right Preliminary result          Discharge Details        Discharge Medications        New Medications        Instructions Start Date   ertapenem 1,000 mg in sodium chloride 0.9 % 100 mL IVPB   1,000 mg, Intravenous, Every 24 Hours             Continue These Medications        Instructions Start Date   acetaminophen 325 MG tablet  Commonly known as: TYLENOL   650 mg, Oral, Every 4 Hours PRN, OTC      allopurinol 100 MG tablet  Commonly known as: ZYLOPRIM   100 mg, Oral, Daily      colchicine 0.6 MG tablet   0.6 mg, Oral, Daily      diphenhydrAMINE 25 mg capsule  Commonly known as: BENADRYL   25 mg, Oral, Every 6 Hours PRN, OTC      docusate sodium 100 MG capsule   100 mg, Oral, 2 Times Daily PRN      famotidine 20 MG tablet  Commonly known as: PEPCID   20 mg, Oral, 2 Times Daily      hydroCHLOROthiazide 12.5 MG tablet  Commonly known as: HYDRODIURIL   12.5 mg, Oral, Daily      levocetirizine 5 MG tablet  Commonly known as: XYZAL   5 mg, Oral, Every Evening      losartan-hydrochlorothiazide 100-25 MG per tablet  Commonly known as: HYZAAR   1 tablet, Oral, Daily      omeprazole 40 MG capsule  Commonly known as: priLOSEC   40 mg, Oral, Daily      ondansetron 4 MG tablet  Commonly known as: ZOFRAN   4 mg, Oral, Every 6 Hours PRN      Ozempic (0.25 or 0.5 MG/DOSE) 2 MG/1.5ML solution pen-injector  Generic drug: Semaglutide(0.25 or 0.5MG/DOS)   Subcutaneous, Weekly      valACYclovir 500 MG tablet  Commonly known as: VALTREX   1,000 mg, Oral, Daily             Stop These Medications      levoFLOXacin 500 MG tablet  Commonly known as: LEVAQUIN            ASK your doctor about these medications        Instructions Start Date   HYDROcodone-acetaminophen 5-325 MG per tablet  Commonly known as:  NORCO   2 tablets, Oral, Every 4 Hours PRN      hydrocortisone 1 % cream   Topical, Every 12 Hours Scheduled, To effected area as needed      lisinopril-hydrochlorothiazide 20-12.5 MG per tablet  Commonly known as: PRINZIDE,ZESTORETIC   1 tablet, Daily      sucralfate 1 g tablet  Commonly known as: CARAFATE   1 g, 4 Times Daily               No Active Allergies      Discharge Disposition:  Home or Self Care    Diet:  Hospital:  Diet Order   Procedures    Diet: Regular/House Diet; Texture: Regular Texture (IDDSI 7); Fluid Consistency: Thin (IDDSI 0)            Activity: as tolerated      Restrictions or Other Recommendations:  none       CODE STATUS:    Code Status and Medical Interventions:   Ordered at: 12/01/23 1328     Code Status (Patient has no pulse and is not breathing):    CPR (Attempt to Resuscitate)     Medical Interventions (Patient has pulse or is breathing):    Full Support       Additional Instructions for the Follow-ups that You Need to Schedule       Discharge Follow-up with Specified Provider: Dr. Pako HU, tomorrow 12/4/23 at 9:30am in their outpatient office   As directed      To: Dr. Pako HU, tomorrow 12/4/23 at 9:30am in their outpatient office                      Linda Onofre DO  12/03/23      Time Spent on Discharge:  I spent  35  minutes on this discharge activity which included: face-to-face encounter with the patient, reviewing the data in the system, coordination of the care with the nursing staff as well as consultants, documentation, and entering orders.

## 2023-12-03 NOTE — CASE MANAGEMENT/SOCIAL WORK
Discharge Planning Assessment  [facility]     Patient Name: Antonino Barrera  MRN: [id number]  Today's Date: 12/3/2023    Admit Date: 12/1/2023    Plan: Home vs Home with Home Health   Discharge Needs Assessment       Row Name 12/03/23 1355       Living Environment    People in Home alone    Current Living Arrangements home    Potentially Unsafe Housing Conditions unable to assess    Primary Care Provided by self    Provides Primary Care For no one, unable/limited ability to care for self    Family Caregiver if Needed none    Quality of Family Relationships unable to assess    Able to Return to Prior Arrangements other (see comments)  TBD       Transition Planning    Patient/Family Anticipates Transition to home with help/services    Patient/Family Anticipated Services at Transition outpatient care    Transportation Anticipated car, drives self       Discharge Needs Assessment    Readmission Within the Last 30 Days no previous admission in last 30 days    Equipment Currently Used at Home none    Concerns to be Addressed discharge planning    Discharge Facility/Level of Care Needs home with home health    Current Discharge Risk chronically ill;lives alone;lack of support system/caregiver;physical impairment                   Discharge Plan       Row Name 12/03/23 8913       Plan    Plan Home vs Home with Home Health    Patient/Family in Agreement with Plan yes    Plan Comments I have met with Mr. Barrera at his bedside today to discuss the discharge plan.  He states that he lives in his own home alone in [facility].  He reports that he is independent with activities of daily living and mobility.  He denies use of DME and current receipt of home health services.  PT has recommended home health services, however Mr. Barrera will require outpatient IV antibiotics and states that he wants to go to [facility] for daily infusions.  He states that his PCP has confirmed that they will provide IV  Enmanuel.  Mr. Barrera states that he can drive himself daily to this clinic.  CM will continue to follow the plan of care, [facility] post discharge in am and cont to assist with dishcarge planning as recommnedations become available.    Final Discharge Disposition Code 01 - home or self-care                  Continued Care and Services - Admitted Since 12/1/2023    Coordination has not been started for this encounter.       Expected Discharge Date and Time       Expected Discharge Date Expected Discharge Time    Dec 3, 2023            Demographic Summary       Row Name 12/03/23 1354       General Information    Admission Type inpatient    Arrived From home    Referral Source admission list    Reason for Consult discharge planning    Preferred Language English       Contact Information    Permission Granted to Share Info With                    Functional Status       Row Name 12/03/23 1354       Functional Status, IADL    Medications independent    Meal Preparation independent    Housekeeping independent    Laundry independent    Shopping independent       Employment/    Current or Previous Occupation self-employed                   Psychosocial    No documentation.                  Abuse/Neglect    No documentation.                  Legal    No documentation.                  Substance Abuse    No documentation.                  Patient Forms    No documentation.                     Becky Carr RN

## 2023-12-03 NOTE — PLAN OF CARE
Problem: Adult Inpatient Plan of Care  Goal: Absence of Hospital-Acquired Illness or Injury  Intervention: Prevent and Manage VTE (Venous Thromboembolism) Risk  Recent Flowsheet Documentation  Taken 12/3/2023 0400 by Chantelle Castañeda RN  Activity Management: activity encouraged  Taken 12/3/2023 0200 by Chantelle Castañeda RN  Activity Management: activity encouraged  Taken 12/3/2023 0000 by Chantelle Castañeda RN  Activity Management: activity encouraged  Taken 12/2/2023 2200 by Chantelle Castañeda RN  Activity Management: activity encouraged  Taken 12/2/2023 2000 by Chantelle Castañeda RN  Activity Management: activity encouraged  VTE Prevention/Management: (see MAR) other (see comments)  Range of Motion: active ROM (range of motion) encouraged   Goal Outcome Evaluation:      Plan of care reviewed with patient at bedside, VSS, NSR to star on tele, afebrile throughout shift, ambulated in room and tolerated well

## 2023-12-03 NOTE — PROGRESS NOTES
Patient Name: Antonino Barrera  : 1955  MRN: [id number]    Subjective   Patient is a 68 y.o. male with history of prior kidney stones and prior UTIs but no history of prostatitis.  Presented to [facility] emergency department  with 2 to 3 days of fevers, malaise, flank pain and difficulty urinating.  He was given dose ceftriaxone in emergency department and discharged home on levofloxacin but continued to feel poorly.  Blood cultures returned positive for ESBL E. coli so he was called back to hospital for admission.  Started on ertapenem.  ID was asked to evaluate.  Patient still having high fever up to 102.3.  Ongoing flank discomfort but now seems to be voiding better.  He does not passed any kidney stones recently and no recent urological procedures    Listed history of allergy to Zosyn in 2017, which apparently was related to the time he had a surgical procedure and based on description sounds like he had more of a contact dermatitis from some sort of cleaning solution used in the OR rather than no systemic reaction to the antibiotic.  Patient says he has tolerated amoxicillin since then    23: Temperature up to 101.9 overnight.  Tachycardia improved.  Blood pressure stable.  Episodes of loose stools overnight.  He received stool softeners last night.  C. difficile test was ordered this morning.  Overall feeling better no side effects from antibiotics noted per patient or RN    12/3/23: Afebrile for past 24 hours.  Improved.  Flank pain resolved.  Stool frequency at baseline.  Appetite okay.  Tolerating current IV antibiotics via peripheral IV in right hand    Review of Systems  See HPI      No Active Allergies      Objective   Antibiotics:  Anti-Infectives (From admission, onward)      Ordered     Dose/Rate Route Frequency Start Stop    23 1328  ertapenem (INVanz) 1,000 mg in sodium chloride 0.9 % 100 mL IVPB        Ordering Provider: Moisés Zelaya MD    1,000 mg  200 mL/hr over 30 Minutes  Intravenous Every 24 Hours 12/02/23 1200 12/07/23 1159    12/01/23 1113  ertapenem (INVanz) 1,000 mg in sodium chloride 0.9 % 100 mL IVPB        Ordering Provider: Ramos Cedillo APRN    1,000 mg  200 mL/hr over 30 Minutes Intravenous Once 12/01/23 1129 12/01/23 1248            Other Medications:  Current Facility-Administered Medications   Medication Dose Route Frequency Provider Last Rate Last Admin    acetaminophen (TYLENOL) tablet 650 mg  650 mg Oral Q6H PRN Moisés Zelaya MD   650 mg at 12/01/23 2052    allopurinol (ZYLOPRIM) tablet 100 mg  100 mg Oral Daily Moisés Zelaya MD   100 mg at 12/02/23 0821    Calcium Replacement - Follow Nurse / BPA Driven Protocol   Does not apply PRN Moisés Zelaya MD        cetirizine (zyrTEC) tablet 10 mg  10 mg Oral Daily Moisés Zelaya MD   10 mg at 12/02/23 0821    colchicine tablet 0.6 mg  0.6 mg Oral Daily Moisés Zelaya MD   0.6 mg at 12/02/23 0821    dextrose (D50W) (25 g/50 mL) IV injection 25 g  25 g Intravenous Q15 Min PRN Moisés Zelaya MD        dextrose (GLUTOSE) oral gel 15 g  15 g Oral Q15 Min PRN Moisés Zelaya MD        ertapenem (INVanz) 1,000 mg in sodium chloride 0.9 % 100 mL IVPB  1,000 mg Intravenous Q24H Moisés Zelaya  mL/hr at 12/02/23 1233 1,000 mg at 12/02/23 1233    famotidine (PEPCID) tablet 20 mg  20 mg Oral BID Moisés Zelaya MD   20 mg at 12/02/23 2059    glucagon (GLUCAGEN) injection 1 mg  1 mg Intramuscular Q15 Min PRN Moisés Zelaya MD        heparin (porcine) 5000 UNIT/ML injection 5,000 Units  5,000 Units Subcutaneous Q8H Moisés Zelaya MD   5,000 Units at 12/03/23 0518    Insulin Lispro (humaLOG) injection 2-7 Units  2-7 Units Subcutaneous 4x Daily AC & at Bedtime Moisés Zelaya MD        lactobacillus acidophilus (RISAQUAD) capsule 1 capsule  1 capsule Oral BID Sancho Min MD   1 capsule at 12/02/23 2059    Magnesium Standard Dose Replacement - Follow Nurse / BPA Driven Protocol   Does not apply PRN  "Moisés Zelaya MD        ondansetron (ZOFRAN) tablet 4 mg  4 mg Oral Q6H PRN Moisés Zelaya MD        Or    ondansetron (ZOFRAN) injection 4 mg  4 mg Intravenous Q6H PRN Moisés Zelaya MD        pantoprazole (PROTONIX) EC tablet 40 mg  40 mg Oral Q AM Moisés Zelaya MD   40 mg at 12/03/23 0518    Phosphorus Replacement - Follow Nurse / BPA Driven Protocol   Does not apply PRN Moisés eZlaya MD        Potassium Replacement - Follow Nurse / BPA Driven Protocol   Does not apply PRN Moisés Zelaya MD        prochlorperazine (COMPAZINE) injection 5 mg  5 mg Intravenous Q6H PRN Moisés Zelaya MD        Or    prochlorperazine (COMPAZINE) tablet 5 mg  5 mg Oral Q6H PRN Moisés Zelaya MD        Or    prochlorperazine (COMPAZINE) suppository 25 mg  25 mg Rectal Q12H PRN Moisés Zelaya MD        sodium chloride 0.9 % flush 10 mL  10 mL Intravenous PRN Moisés Zelaya MD        sodium chloride 0.9 % flush 10 mL  10 mL Intravenous Q12H Moisés Zelaya MD   10 mL at 12/02/23 2100    sodium chloride 0.9 % flush 10 mL  10 mL Intravenous PRN Moisés Zelaya MD        sodium chloride 0.9 % infusion 40 mL  40 mL Intravenous PRN Moisés Zelaya MD        sucralfate (CARAFATE) tablet 1 g  1 g Oral 4x Daily Moisés Zelaya MD   1 g at 12/02/23 2059       Physical Exam:   Vital Signs   /72 (BP Location: Right arm, Patient Position: Lying)   Pulse 85   Temp 98.3 °F (36.8 °C) (Oral)   Resp 18   Ht 175.3 cm (69\")   Wt 85.7 kg (189 lb)   SpO2 97%   BMI 27.91 kg/m²     GENERAL: Awake and alert, in no acute distress.   HEENT: Normocephalic, atraumatic.   EOMI. No conjunctival injection. No icterus.   HEART: RRR; No murmur.  LUNGS:   Normal respiratory effort. Nonlabored.  ABDOMEN: Soft, no suprapubic or CVA tenderness  : no catheter  MSK: FROM without joint effusions noted arms/legs.    SKIN: Warm and dry without cutaneous eruptions on Inspection/palpation.    NEURO: Oriented to PPT.     PSYCHIATRIC: Normal " "insight and judgement. Cooperative with PE  PIV in right hand     Laboratory Data  Lab Results   Component Value Date    WBC 7.93 12/03/2023    HGB 12.1 (L) 12/03/2023    HCT 35.3 (L) 12/03/2023    MCV 88.0 12/03/2023     12/03/2023     Lab Results   Component Value Date    GLUCOSE 84 12/03/2023    CALCIUM 9.4 12/03/2023     (L) 12/03/2023    K 3.7 12/03/2023    CO2 19.0 (L) 12/03/2023     12/03/2023    BUN 16 12/03/2023    CREATININE 1.15 12/03/2023     Estimated Creatinine Clearance: 65.8 mL/min (by C-G formula based on SCr of 1.15 mg/dL).  Lab Results   Component Value Date    ALT 16 12/03/2023    AST 28 12/03/2023    ALKPHOS 76 12/03/2023    BILITOT 0.8 12/03/2023     No results found for: \"CRP\"  No results found for: \"SEDRATE\"    The above labs were reviewed.     Microbiology:  Microbiology Results (last 10 days)       Procedure Component Value - Date/Time    Urine Culture - Urine, Urine, Clean Catch [[id number]]  (Normal) Collected: 12/01/23 1329    Lab Status: Final result Specimen: Urine, Clean Catch Updated: 12/02/23 1225     Urine Culture No growth    Blood Culture - Blood, Hand, Right [[id number]]  (Abnormal) Collected: 12/01/23 1150    Lab Status: Preliminary result Specimen: Blood from Hand, Right Updated: 12/02/23 2108     Blood Culture Abnormal Stain     Gram Stain Aerobic Bottle Gram negative bacilli    Blood Culture - Blood, Arm, Right [[id number]]  (Abnormal) Collected: 12/01/23 1145    Lab Status: Preliminary result Specimen: Blood from Arm, Right Updated: 12/03/23 0617     Blood Culture Escherichia coli ESBL     Comment:   Consider infectious disease consult.  Susceptibility results may not correlate to clinical outcomes.  For ESBL-producing infections in the blood, a carbapenem is recommended as first-line therapy for optimal clinical outcomes.        Isolated from --     Gram Stain Aerobic Bottle Gram negative bacilli    Narrative:      Refer to previous blood culture collected " on 11/30/2023 1253 for MICs.    Blood Culture - Blood, Arm, Right [[id number]]  (Normal) Collected: 11/30/23 1452    Lab Status: Preliminary result Specimen: Blood from Arm, Right Updated: 12/02/23 1515     Blood Culture No growth at 2 days    Blood Culture - Blood, Arm, Left [[id number]]  (Abnormal)  (Susceptibility) Collected: 11/30/23 1253    Lab Status: Preliminary result Specimen: Blood from Arm, Left Updated: 12/03/23 0617     Blood Culture Escherichia coli ESBL     Comment:   Consider infectious disease consult.  Susceptibility results may not correlate to clinical outcomes.  For ESBL-producing infections in the blood, a carbapenem is recommended as first-line therapy for optimal clinical outcomes.        Isolated from Aerobic Bottle     Gram Stain Aerobic Bottle Gram negative bacilli    Narrative:      Organism under investigation.      Susceptibility        Escherichia coli ESBL      FLORI (Preliminary)      Ertapenem Susceptible      Meropenem Susceptible                       Susceptibility Comments       Escherichia coli ESBL    Cefazolin sensitivity will not be reported for Enterobacteriaceae in non-urine isolates. If cefazolin is preferred, please call the microbiology lab to request an E-test.  With the exception of urinary-sourced infections, aminoglycosides should not be used as monotherapy.               Blood Culture ID, PCR - Blood, Arm, Left [[id number]]  (Abnormal) Collected: 11/30/23 1253    Lab Status: Final result Specimen: Blood from Arm, Left Updated: 12/01/23 0656     BCID, PCR Escherichia coli. Identification by BCID2 PCR.     BCID, PCR 2 CTX-M (ESBL) detected. Identification by BCID2 PCR     BOTTLE TYPE Aerobic Bottle    Urine Culture - Urine, Urine, Clean Catch [[id number]] Collected: 11/30/23 1243    Lab Status: Final result Specimen: Urine, Clean Catch Updated: 12/02/23 1233     Urine Culture >100,000 CFU/mL Mixed Yoana Isolated    Narrative:      Specimen contains mixed organisms of  questionable pathogenicity suggestive of contamination. If symptoms persist, suggest recollection.  Colonization of the urinary tract without infection is common. Treatment is discouraged unless the patient is symptomatic, pregnant, or undergoing an invasive urologic procedure.            Radiology:  XR Chest 1 View    Result Date: 12/1/2023  Impression: Low lung volumes with hypoventilatory change including bibasilar atelectasis. No focal consolidation. Electronically Signed: Jadiel Teran MD  12/1/2023 11:38 AM EST  Workstation ID: [id number]    CT Abdomen Pelvis With Contrast    Result Date: 11/30/2023  Impression: 1.Imaging features are suggestive of left-sided pyelonephritis without evidence of obstruction. Correlate with symptoms and lab values. 2.Interval likely chronic T11 compression fracture. Electronically Signed: Meng Browne MD  11/30/2023 4:23 PM CST  Workstation ID: [id number]          Assessment   ESBL E. coli bacteremia and pyelonephritis: 12/1 blood cx still positive, but clinically improving with improved fever curve and WBC .  Fever, sepsis due to above: improving  Neutrophilic leukocytosis: resolved  Acute kidney injury: resolved  Loose stools: Patient says he has chronic loose stools at baseline.  No increased stool frequency.  Stool softeners held  Hyponatremia  History of renal stones but none identified on CT imaging  Possible history of prostatomegaly, but no history of prostatitis: No abnormality noted on CT scan  Listed history of allergy to Zosyn in 2017, which apparently was related to the time he had a surgical procedure and based on description sounds like he had more of a contact dermatitis from some sort of cleaning solution used in the OR rather than no systemic reaction to the antibiotic.  Patient says he has tolerated amoxicillin since then, so allergy disproved    PLAN:   Microbiology data reviewed.  Called lab: Isolate is resistant to quinolones and TMP/SMX, so no reliable oral  options.    Recommend IV ertapenem 1 g daily for at least 7 to 10 days.  Patient says he feels well enough to go home and will be staying with his sister in [place] at least for the short term. We discussed daily IV infusion at [facility] office via PIV.  Patient is agreeable at least for the short-term but is hoping to transition care to his PCP in [place] sometime next week.  I told him I cannot guarantee this can set up locally but I am happy to have our staff look into it tomorrow.  I will see him back in the office tomorrow at 9:30 if he is discharged today.    Probiotic while on antibiotics     Complex MDM. Discussed with Dr Onofre.     Sancho Min MD  12/3/2023

## 2023-12-04 VITALS
HEIGHT: 69 IN | RESPIRATION RATE: 18 BRPM | SYSTOLIC BLOOD PRESSURE: 115 MMHG | DIASTOLIC BLOOD PRESSURE: 76 MMHG | BODY MASS INDEX: 27.99 KG/M2 | TEMPERATURE: 97.5 F | WEIGHT: 189 LBS | OXYGEN SATURATION: 96 % | HEART RATE: 92 BPM

## 2023-12-04 LAB
BACTERIA SPEC AEROBE CULT: ABNORMAL
GLUCOSE BLDC GLUCOMTR-MCNC: 77 MG/DL (ref 70–130)
GLUCOSE BLDC GLUCOMTR-MCNC: 98 MG/DL (ref 70–130)
GRAM STN SPEC: ABNORMAL
ISOLATED FROM: ABNORMAL
QT INTERVAL: 318 MS
QTC INTERVAL: 405 MS

## 2023-12-04 PROCEDURE — 82948 REAGENT STRIP/BLOOD GLUCOSE: CPT

## 2023-12-04 PROCEDURE — 99231 SBSQ HOSP IP/OBS SF/LOW 25: CPT | Performed by: HOSPITALIST

## 2023-12-04 PROCEDURE — 25010000002 ERTAPENEM PER 500 MG: Performed by: HOSPITALIST

## 2023-12-04 PROCEDURE — 25010000002 HEPARIN (PORCINE) PER 1000 UNITS: Performed by: HOSPITALIST

## 2023-12-04 PROCEDURE — 97166 OT EVAL MOD COMPLEX 45 MIN: CPT

## 2023-12-04 PROCEDURE — 97116 GAIT TRAINING THERAPY: CPT

## 2023-12-04 PROCEDURE — 97110 THERAPEUTIC EXERCISES: CPT

## 2023-12-04 RX ORDER — CHOLECALCIFEROL (VITAMIN D3) 125 MCG
5 CAPSULE ORAL NIGHTLY PRN
Status: DISCONTINUED | OUTPATIENT
Start: 2023-12-04 | End: 2023-12-04 | Stop reason: HOSPADM

## 2023-12-04 RX ADMIN — HEPARIN SODIUM 5000 UNITS: 5000 INJECTION INTRAVENOUS; SUBCUTANEOUS at 05:10

## 2023-12-04 RX ADMIN — PANTOPRAZOLE SODIUM 40 MG: 40 TABLET, DELAYED RELEASE ORAL at 05:10

## 2023-12-04 RX ADMIN — ERTAPENEM 1000 MG: 1 INJECTION INTRAMUSCULAR; INTRAVENOUS at 12:14

## 2023-12-04 RX ADMIN — Medication 5 MG: at 02:34

## 2023-12-04 RX ADMIN — CETIRIZINE HYDROCHLORIDE 10 MG: 10 TABLET, FILM COATED ORAL at 09:19

## 2023-12-04 RX ADMIN — SUCRALFATE 1 G: 1 TABLET ORAL at 09:19

## 2023-12-04 RX ADMIN — FAMOTIDINE 20 MG: 20 TABLET, FILM COATED ORAL at 09:19

## 2023-12-04 RX ADMIN — COLCHICINE 0.6 MG: 0.6 TABLET ORAL at 09:19

## 2023-12-04 RX ADMIN — ALLOPURINOL 100 MG: 100 TABLET ORAL at 09:19

## 2023-12-04 RX ADMIN — Medication 1 CAPSULE: at 09:19

## 2023-12-04 RX ADMIN — HEPARIN SODIUM 5000 UNITS: 5000 INJECTION INTRAVENOUS; SUBCUTANEOUS at 14:22

## 2023-12-04 NOTE — PROGRESS NOTES
[facility]  PROGRESS NOTE    Patient Name: Antonino Barrera  : 1955  MRN: [id number]    Date of Admission: 2023  Primary Care Physician: Jose A Mcgarry MD    Subjective   Subjective     CC:  Rigors    HPI:  Patient resting in bed, feeling good. Eager to go home. No new complaints.      Objective   Objective     Vital Signs:   Temp:  [97.5 °F (36.4 °C)-98.7 °F (37.1 °C)] 97.5 °F (36.4 °C)  Heart Rate:  [] 73  Resp:  [18-30] 18  BP: (115-137)/(72-85) 115/76     Physical Exam:  Constitutional: No acute distress, awake, alert  HENT: NCAT, mucous membranes moist  Respiratory: Clear to auscultation bilaterally, respiratory effort normal on RA  Cardiovascular: RRR, no murmurs, rubs, or gallops  Gastrointestinal: Positive bowel sounds, soft, mildly diffusely tender, nondistended  Musculoskeletal: No bilateral ankle edema  Psychiatric: Appropriate affect, cooperative  Neurologic: Oriented x 3, strength symmetric in all extremities, Cranial Nerves grossly intact to confrontation, speech clear  Skin: No rashes    Results Reviewed:  LAB RESULTS:      Lab 23  1253   WBC 7.93 11.59* 15.87* 19.33*   HEMOGLOBIN 12.1* 12.1* 12.8* 14.5   HEMATOCRIT 35.3* 35.4* 36.6* 42.8   PLATELETS 162 164 153 194   NEUTROS ABS  --  9.20* 13.44* 16.55*   IMMATURE GRANS (ABS)  --  0.11* 0.27* 0.23*   LYMPHS ABS  --  0.69* 0.59* 0.64*   MONOS ABS  --  1.36* 1.53* 1.84*   EOS ABS  --  0.19 0.01 0.01   MCV 88.0 89.2 88.8 88.2   PROCALCITONIN  --   --  10.41*  --    LACTATE  --   --  1.2 1.6         Lab 23  0413 23  1152 11/30/23  1253   SODIUM 131* 131* 126* 132*   POTASSIUM 3.7 4.2 3.0* 3.3*   CHLORIDE 100 99 94* 97*   CO2 19.0* 22.0 21.0* 22.0   ANION GAP 12.0 10.0 11.0 13.0   BUN 16 19 20 18   CREATININE 1.15 1.29* 1.38* 1.37*   EGFR 68.9 60.0* 55.4* 55.8*   GLUCOSE 84 81 116* 144*   CALCIUM 9.4 9.3 8.8 9.2          Lab 12/03/23  0413 12/02/23  0349 12/01/23  1152 11/30/23  1253   TOTAL PROTEIN 4.9* 5.6* 5.8* 6.6   ALBUMIN 3.0* 3.1* 3.2* 3.8   GLOBULIN 1.9 2.5 2.6 2.8   ALT (SGPT) 16 11 15 19   AST (SGOT) 28 17 17 24   BILIRUBIN 0.8 1.4* 1.5* 2.1*   ALK PHOS 76 77 76 92   LIPASE  --   --   --  13         Lab 12/01/23  1152   HSTROP T 17                 Brief Urine Lab Results  (Last result in the past 365 days)        Color   Clarity   Blood   Leuk Est   Nitrite   Protein   CREAT   Urine HCG        12/01/23 1329 Yellow   Clear   Small (1+)   Small (1+)   Negative   100 mg/dL (2+)                   Microbiology Results Abnormal       Procedure Component Value - Date/Time    Urine Culture - Urine, Urine, Clean Catch [[id number]]  (Normal) Collected: 12/01/23 1329    Lab Status: Final result Specimen: Urine, Clean Catch Updated: 12/02/23 1225     Urine Culture No growth            No radiology results from the last 24 hrs        Current medications:  Scheduled Meds:allopurinol, 100 mg, Oral, Daily  cetirizine, 10 mg, Oral, Daily  colchicine, 0.6 mg, Oral, Daily  ertapenem, 1,000 mg, Intravenous, Q24H  famotidine, 20 mg, Oral, BID  heparin (porcine), 5,000 Units, Subcutaneous, Q8H  insulin lispro, 2-7 Units, Subcutaneous, 4x Daily AC & at Bedtime  lactobacillus acidophilus, 1 capsule, Oral, BID  pantoprazole, 40 mg, Oral, Q AM  sodium chloride, 10 mL, Intravenous, Q12H  sucralfate, 1 g, Oral, 4x Daily      Continuous Infusions:   PRN Meds:.  acetaminophen    Calcium Replacement - Follow Nurse / BPA Driven Protocol    dextrose    dextrose    glucagon (human recombinant)    Magnesium Standard Dose Replacement - Follow Nurse / BPA Driven Protocol    melatonin    ondansetron **OR** ondansetron    Phosphorus Replacement - Follow Nurse / BPA Driven Protocol    Potassium Replacement - Follow Nurse / BPA Driven Protocol    prochlorperazine **OR** prochlorperazine **OR** prochlorperazine    [COMPLETED] Insert Peripheral IV **AND** sodium  chloride    sodium chloride    sodium chloride    Assessment & Plan   Assessment & Plan     Active Hospital Problems    Diagnosis  POA    **Bacteremia [R78.81]  Yes      Resolved Hospital Problems   No resolved problems to display.        Brief Hospital Course to date:  Antonino Barrera is a 68 y.o. male  is a 68 y.o. male with a past medical history of gout, HTN and DM2 that presented to the ED complaining of fever/chills and sweats with rigors.      This patient's problems and plans were partially entered by my partner and updated as appropriate by me 12/04/23.     Sepsis  ESBL E. Coli BActeremia  Pyelonephritis  -ID following  - continue Ertapenem 1gm IV daily x 7-10 days  - arranging infusions in [place] at outpatient clinic  - BC + for ESBL E. Coli  - urine culture negative     Hyponatremia  - 126 on admission  - improved to 131 this am post IVF     Rigors/fevers/chills  - related to sepsis     Leukocytosis  - 15 on admission- improved to 7 this am     Dysphagia?  - coughing after eating  - SLP evaluated and patient passed swallow evaluation     Diarrhea  - chronic per patient.     Gout  - continue home meds     Hx of HTN  - continue home meds     DM2  - continue home meds    Expected Discharge Location and Transportation: home  Expected Discharge   Expected Discharge Date: 12/6/2023; Expected Discharge Time:      DVT prophylaxis:  Medical DVT prophylaxis orders are present.     AM-PAC 6 Clicks Score (PT): 20 (12/04/23 1121)    CODE STATUS:   Code Status and Medical Interventions:   Ordered at: 12/01/23 1328     Code Status (Patient has no pulse and is not breathing):    CPR (Attempt to Resuscitate)     Medical Interventions (Patient has pulse or is breathing):    Full Support       Linda Onofre DO  12/04/23

## 2023-12-04 NOTE — PROGRESS NOTES
Patient Name: Antonino Barrera  : 1955  MRN: [id number]    Subjective   Patient is a 68 y.o. male with history of prior kidney stones and prior UTIs but no history of prostatitis.  Presented to [facility] emergency department  with 2 to 3 days of fevers, malaise, flank pain and difficulty urinating.  He was given dose ceftriaxone in emergency department and discharged home on levofloxacin but continued to feel poorly.  Blood cultures returned positive for ESBL E. coli so he was called back to hospital for admission.  Started on ertapenem.  ID was asked to evaluate.  Patient still having high fever up to 102.3.  Ongoing flank discomfort but now seems to be voiding better.  He does not passed any kidney stones recently and no recent urological procedures    Listed history of allergy to Zosyn in 2017, which apparently was related to the time he had a surgical procedure and based on description sounds like he had more of a contact dermatitis from some sort of cleaning solution used in the OR rather than no systemic reaction to the antibiotic.  Patient says he has tolerated amoxicillin since then    23: Temperature up to 101.9 overnight.  Tachycardia improved.  Blood pressure stable.  Episodes of loose stools overnight.  He received stool softeners last night.  C. difficile test was ordered this morning.  Overall feeling better no side effects from antibiotics noted per patient or RN    12/3/23: Afebrile for past 24 hours.  Improved.  Flank pain resolved.  Stool frequency at baseline.  Appetite okay.  Tolerating current IV antibiotics via peripheral IV in right hand    23:     Review of Systems  See HPI      No Active Allergies      Objective   Antibiotics:  Anti-Infectives (From admission, onward)      Ordered     Dose/Rate Route Frequency Start Stop    23 1110  ertapenem 1,000 mg in sodium chloride 0.9 % 100 mL IVPB        Ordering Provider: Linda Onofre DO    1,000 mg Intravenous Every 24  Hours 12/03/23 0000 12/10/23 2359    12/01/23 1328  ertapenem (INVanz) 1,000 mg in sodium chloride 0.9 % 100 mL IVPB        Ordering Provider: Moisés Zelaya MD    1,000 mg  200 mL/hr over 30 Minutes Intravenous Every 24 Hours 12/02/23 1200 12/07/23 1159    12/01/23 1113  ertapenem (INVanz) 1,000 mg in sodium chloride 0.9 % 100 mL IVPB        Ordering Provider: Ramos Cedillo APRN    1,000 mg  200 mL/hr over 30 Minutes Intravenous Once 12/01/23 1129 12/01/23 1248            Other Medications:  Current Facility-Administered Medications   Medication Dose Route Frequency Provider Last Rate Last Admin    acetaminophen (TYLENOL) tablet 650 mg  650 mg Oral Q6H PRN Moisés Zelaya MD   650 mg at 12/01/23 2052    allopurinol (ZYLOPRIM) tablet 100 mg  100 mg Oral Daily Moisés Zelaya MD   100 mg at 12/04/23 0919    Calcium Replacement - Follow Nurse / BPA Driven Protocol   Does not apply PRN Moisés Zelaya MD        cetirizine (zyrTEC) tablet 10 mg  10 mg Oral Daily Moisés Zelaya MD   10 mg at 12/04/23 0919    colchicine tablet 0.6 mg  0.6 mg Oral Daily Moisés Zelaya MD   0.6 mg at 12/04/23 0919    dextrose (D50W) (25 g/50 mL) IV injection 25 g  25 g Intravenous Q15 Min PRN Moisés Zelaya MD        dextrose (GLUTOSE) oral gel 15 g  15 g Oral Q15 Min PRN Moisés Zelaya MD        ertapenem (INVanz) 1,000 mg in sodium chloride 0.9 % 100 mL IVPB  1,000 mg Intravenous Q24H Moisés Zelaya  mL/hr at 12/03/23 1152 1,000 mg at 12/03/23 1152    famotidine (PEPCID) tablet 20 mg  20 mg Oral BID Moisés Zelaya MD   20 mg at 12/04/23 0919    glucagon (GLUCAGEN) injection 1 mg  1 mg Intramuscular Q15 Min PRN Moisés Zelaya MD        heparin (porcine) 5000 UNIT/ML injection 5,000 Units  5,000 Units Subcutaneous Q8H Moisés Zelaya MD   5,000 Units at 12/04/23 0510    Insulin Lispro (humaLOG) injection 2-7 Units  2-7 Units Subcutaneous 4x Daily AC & at Bedtime Moisés Zelaya MD        lactobacillus acidophilus  "(RISAQUAD) capsule 1 capsule  1 capsule Oral BID Sancho Min MD   1 capsule at 12/04/23 0919    Magnesium Standard Dose Replacement - Follow Nurse / BPA Driven Protocol   Does not apply PRN Moisés Zelaya MD        melatonin tablet 5 mg  5 mg Oral Nightly PRN Ying Cedillo APRN   5 mg at 12/04/23 0234    ondansetron (ZOFRAN) tablet 4 mg  4 mg Oral Q6H PRN Moisés Zelaya MD        Or    ondansetron (ZOFRAN) injection 4 mg  4 mg Intravenous Q6H PRN Moisés Zelaya MD        pantoprazole (PROTONIX) EC tablet 40 mg  40 mg Oral Q AM Moisés Zelaya MD   40 mg at 12/04/23 0510    Phosphorus Replacement - Follow Nurse / BPA Driven Protocol   Does not apply PRN Moisés Zelaya MD        Potassium Replacement - Follow Nurse / BPA Driven Protocol   Does not apply PRN Moisés Zelaya MD        prochlorperazine (COMPAZINE) injection 5 mg  5 mg Intravenous Q6H PRN Moisés Zelaya MD        Or    prochlorperazine (COMPAZINE) tablet 5 mg  5 mg Oral Q6H PRN Moisés Zelaya MD        Or    prochlorperazine (COMPAZINE) suppository 25 mg  25 mg Rectal Q12H PRN Moisés Zelaya MD        sodium chloride 0.9 % flush 10 mL  10 mL Intravenous PRN Moisés Zelaya MD        sodium chloride 0.9 % flush 10 mL  10 mL Intravenous Q12H Moisés Zelaya MD   10 mL at 12/03/23 2059    sodium chloride 0.9 % flush 10 mL  10 mL Intravenous PRN Moisés Zelaya MD        sodium chloride 0.9 % infusion 40 mL  40 mL Intravenous PRN Moisés Zelaya MD        sucralfate (CARAFATE) tablet 1 g  1 g Oral 4x Daily Moisés Zelaya MD   1 g at 12/04/23 0919       Physical Exam:   Vital Signs   /72 (BP Location: Right arm, Patient Position: Lying)   Pulse 81   Temp 97.8 °F (36.6 °C) (Oral)   Resp 20   Ht 175.3 cm (69\")   Wt 85.7 kg (189 lb)   SpO2 96%   BMI 27.91 kg/m²     GENERAL: Awake and alert, in no acute distress.   HEENT: Normocephalic, atraumatic.   EOMI. No conjunctival injection. No icterus.   HEART: RRR; No " "murmur.  LUNGS:   Normal respiratory effort. Nonlabored.  ABDOMEN: Soft, no suprapubic or CVA tenderness  : no catheter  MSK: FROM without joint effusions noted arms/legs.    SKIN: Warm and dry without cutaneous eruptions on Inspection/palpation.    NEURO: Oriented to PPT.     PSYCHIATRIC: Normal insight and judgement. Cooperative with PE  PIV in right hand     Laboratory Data  Lab Results   Component Value Date    WBC 7.93 12/03/2023    HGB 12.1 (L) 12/03/2023    HCT 35.3 (L) 12/03/2023    MCV 88.0 12/03/2023     12/03/2023     Lab Results   Component Value Date    GLUCOSE 84 12/03/2023    CALCIUM 9.4 12/03/2023     (L) 12/03/2023    K 3.7 12/03/2023    CO2 19.0 (L) 12/03/2023     12/03/2023    BUN 16 12/03/2023    CREATININE 1.15 12/03/2023     Estimated Creatinine Clearance: 65.8 mL/min (by C-G formula based on SCr of 1.15 mg/dL).  Lab Results   Component Value Date    ALT 16 12/03/2023    AST 28 12/03/2023    ALKPHOS 76 12/03/2023    BILITOT 0.8 12/03/2023     No results found for: \"CRP\"  No results found for: \"SEDRATE\"    The above labs were reviewed.     Microbiology:  Microbiology Results (last 10 days)       Procedure Component Value - Date/Time    Urine Culture - Urine, Urine, Clean Catch [[id number]]  (Normal) Collected: 12/01/23 1329    Lab Status: Final result Specimen: Urine, Clean Catch Updated: 12/02/23 1225     Urine Culture No growth    Blood Culture - Blood, Hand, Right [[id number]]  (Abnormal) Collected: 12/01/23 1150    Lab Status: Final result Specimen: Blood from Hand, Right Updated: 12/04/23 0619     Blood Culture Escherichia coli ESBL     Comment:   Consider infectious disease consult.  Susceptibility results may not correlate to clinical outcomes.  For ESBL-producing infections in the blood, a carbapenem is recommended as first-line therapy for optimal clinical outcomes.        Isolated from --     Gram Stain Aerobic Bottle Gram negative bacilli    Narrative:      Refer to " previous blood culture collected on 11/30/2023 1253 for MICs.    Blood Culture - Blood, Arm, Right [[id number]]  (Abnormal) Collected: 12/01/23 1145    Lab Status: Final result Specimen: Blood from Arm, Right Updated: 12/04/23 0617     Blood Culture Escherichia coli ESBL     Comment:   Consider infectious disease consult.  Susceptibility results may not correlate to clinical outcomes.  For ESBL-producing infections in the blood, a carbapenem is recommended as first-line therapy for optimal clinical outcomes.        Isolated from --     Gram Stain Aerobic Bottle Gram negative bacilli    Narrative:      Refer to previous blood culture collected on 11/30/2023 1253 for MICs.    Blood Culture - Blood, Arm, Right [[id number]]  (Normal) Collected: 11/30/23 1452    Lab Status: Preliminary result Specimen: Blood from Arm, Right Updated: 12/03/23 1515     Blood Culture No growth at 3 days    Blood Culture - Blood, Arm, Left [[id number]]  (Abnormal)  (Susceptibility) Collected: 11/30/23 1253    Lab Status: Final result Specimen: Blood from Arm, Left Updated: 12/04/23 0617     Blood Culture Escherichia coli ESBL     Comment:   Consider infectious disease consult.  Susceptibility results may not correlate to clinical outcomes.  For ESBL-producing infections in the blood, a carbapenem is recommended as first-line therapy for optimal clinical outcomes.        Isolated from Aerobic Bottle     Gram Stain Aerobic Bottle Gram negative bacilli    Narrative:            Susceptibility        Escherichia coli ESBL      FLORI      Ertapenem Susceptible      Meropenem Susceptible                       Susceptibility Comments       Escherichia coli ESBL    Cefazolin sensitivity will not be reported for Enterobacteriaceae in non-urine isolates. If cefazolin is preferred, please call the microbiology lab to request an E-test.  With the exception of urinary-sourced infections, aminoglycosides should not be used as monotherapy.               Blood  Culture ID, PCR - Blood, Arm, Left [[id number]]  (Abnormal) Collected: 11/30/23 1253    Lab Status: Final result Specimen: Blood from Arm, Left Updated: 12/01/23 0656     BCID, PCR Escherichia coli. Identification by BCID2 PCR.     BCID, PCR 2 CTX-M (ESBL) detected. Identification by BCID2 PCR     BOTTLE TYPE Aerobic Bottle    Urine Culture - Urine, Urine, Clean Catch [[id number]] Collected: 11/30/23 1243    Lab Status: Final result Specimen: Urine, Clean Catch Updated: 12/02/23 1233     Urine Culture >100,000 CFU/mL Mixed [place] Isolated    Narrative:      Specimen contains mixed organisms of questionable pathogenicity suggestive of contamination. If symptoms persist, suggest recollection.  Colonization of the urinary tract without infection is common. Treatment is discouraged unless the patient is symptomatic, pregnant, or undergoing an invasive urologic procedure.            Radiology:  XR Chest 1 View    Result Date: 12/1/2023  Impression: Low lung volumes with hypoventilatory change including bibasilar atelectasis. No focal consolidation. Electronically Signed: Jadiel Teran MD  12/1/2023 11:38 AM EST  Workstation ID: [id number]    CT Abdomen Pelvis With Contrast    Result Date: 11/30/2023  Impression: 1.Imaging features are suggestive of left-sided pyelonephritis without evidence of obstruction. Correlate with symptoms and lab values. 2.Interval likely chronic T11 compression fracture. Electronically Signed: Meng Browne MD  11/30/2023 4:23 PM CST  Workstation ID: [id number]          Assessment   ESBL E. coli bacteremia and pyelonephritis: 12/1 blood cx still positive, but clinically improving with improved fever curve and WBC. Isolate is resistant to quinolones and TMP/SMX, so no reliable oral options.  Fever, sepsis due to above: resolved  Chronic loose stools: Patient says he has chronic loose stools at baseline.  No increased stool frequency.  Stool softeners held  Hyponatremia  History of renal stones but none  identified on CT imaging  Possible history of prostatomegaly, but no history of prostatitis: No abnormality noted on CT scan  Listed history of allergy to Zosyn in 2017, which apparently was related to the time he had a surgical procedure and based on description sounds like he had more of a contact dermatitis from some sort of cleaning solution used in the OR rather than no systemic reaction to the antibiotic.  Patient says he has tolerated amoxicillin since then, so allergy disproved    PLAN:   Microbiology data reviewed.  Called lab: Isolate is resistant to quinolones and TMP/SMX, so no reliable oral options.    Recommend IV ertapenem 1 g daily for 7 more days through December 11    Patient was set up to discharge home yesterday and come to the [facility] office today to receive IV antibiotics and coordinate further care as an outpatient, but patient is now refusing to come to [facility].  Patient instead would like to go to his primary care office daily in  [place] and thinks that IV antibiotics can be done there.  Discussed with case management who is looking into options.  Patient would like to infuse daily with peripheral IV if possible.    Probiotic while on antibiotics     Complex MDM. Discussed with CM and RN.  Okay to discharge from my perspective once arrangements have been made    Sancho Min MD  12/4/2023

## 2023-12-04 NOTE — PLAN OF CARE
Goal Outcome Evaluation:  Plan of Care Reviewed With: patient           Outcome Evaluation: OT eval completed. Pt presents below baseline for ADL performance, limited by generalized weakness and mild balance impairment. Pt CGA to ambulate to bathroom, SBA for toilet transfer and toileting tasks in standing. Pt completed grooming standing at sink with SBA. IP OT services warranted. Recommend home with assist and HHOT at discharge.      Anticipated Discharge Disposition (OT): home with assist, home with home health

## 2023-12-04 NOTE — THERAPY TREATMENT NOTE
Patient Name: Antonino Barrera  : 1955    MRN: [id number]                              Today's Date: 2023       Admit Date: 2023    Visit Dx:     ICD-10-CM ICD-9-CM   1. Pyelonephritis  N12 590.80   2. Sepsis, due to unspecified organism, unspecified whether acute organ dysfunction present  A41.9 038.9     995.91   3. Fever in adult  R50.9 780.60     Patient Active Problem List   Diagnosis    Right ureteral stone    Right upper quadrant abdominal pain    HTN (hypertension)    Gall stones    Cholecystitis    Hypotension    Hematoma/Post Op bleeding    Anemia, ABL from surgery + dilutional    Bacteremia     Past Medical History:   Diagnosis Date    DVT of leg (deep venous thrombosis)     over 10 years ago after trauma to left leg     Hypertension     Kidney stone     Meniere disease      Past Surgical History:   Procedure Laterality Date    CHOLECYSTECTOMY N/A 2/15/2017    Procedure: CHOLECYSTECTOMY LAPAROSCOPIC ,UMBILICAL HERNIA REPAIR ;  Surgeon: Ryan Corona MD;  Location:  [facility];  Service:     CHOLECYSTECTOMY N/A 2017    Procedure: LAPRASCOPIC EVACUATION OF ABD HEMATOMA;  Surgeon: Ryan Corona MD;  Location:  [facility];  Service:     KNEE SURGERY        General Information       Row Name 23 111          Physical Therapy Time and Intention    Document Type therapy note (daily note)  -AS     Mode of Treatment physical therapy  -AS       Row Name 23 1116          General Information    Patient Profile Reviewed yes  -AS     Existing Precautions/Restrictions fall;other (see comments)  contact precautions, B LE knee pain(chronic)  -AS     Barriers to Rehab none identified  -AS       Row Name 23 111          Cognition    Orientation Status (Cognition) oriented x 4  -AS       Row Name 23 1116          Safety Issues, Functional Mobility    Safety Issues Affecting Function (Mobility) awareness of need for assistance;safety precaution awareness;positioning of  assistive device;sequencing abilities  -AS     Impairments Affecting Function (Mobility) balance;endurance/activity tolerance;strength  -AS     Comment, Safety Issues/Impairments (Mobility) alert and following commands  -AS               User Key  (r) = Recorded By, (t) = Taken By, (c) = Cosigned By      Initials Name Provider Type    AS Yennifer Reyes PTA Physical Therapist Assistant                   Mobility       Row Name 12/04/23 1117          Bed Mobility    Comment, (Bed Mobility) UIC pre/post tx  -AS       Row Name 12/04/23 1117          Transfers    Comment, (Transfers) verbal cues for hand placement  -AS       Row Name 12/04/23 1117          Sit-Stand Transfer    Sit-Stand [place] (Transfers) standby assist  -AS     Assistive Device (Sit-Stand Transfers) cane, straight  -AS       Row Name 12/04/23 1117          Gait/Stairs (Locomotion)    [place] Level (Gait) verbal cues;contact guard;1 person assist  -AS     Assistive Device (Gait) cane, straight  -AS     Distance in Feet (Gait) 25 + 25  -AS     Deviations/Abnormal Patterns (Gait) bilateral deviations;roxanna decreased;gait speed decreased;festinating/shuffling;stride length decreased;weight shifting decreased  -AS     Bilateral Gait Deviations forward flexed posture;heel strike decreased  -AS     Comment, (Gait/Stairs) patient ambulated 25' + 25' with CGA x1 and STC for support, cues for cane placement and sequencing. Patient reported decreased knee pain with use of cane. With education on proper use of cane patient demonstrated good gait mechancis and no LOB or unsteadiness noticed.  -AS               User Key  (r) = Recorded By, (t) = Taken By, (c) = Cosigned By      Initials Name Provider Type    AS Yennifer Reyes PTA Physical Therapist Assistant                   Obj/Interventions       Row Name 12/04/23 1119          Motor Skills    Therapeutic Exercise knee;ankle  -AS       Row Name 12/04/23 1119          Knee (Therapeutic  Exercise)    Knee (Therapeutic Exercise) strengthening exercise  -AS     Knee Strengthening (Therapeutic Exercise) bilateral;LAQ (long arc quad);sitting;10 repetitions;marching while seated  -AS       Row Name 12/04/23 1119          Ankle (Therapeutic Exercise)    Ankle (Therapeutic Exercise) AROM (active range of motion)  -AS     Ankle AROM (Therapeutic Exercise) bilateral;dorsiflexion;plantarflexion;sitting;10 repetitions  -AS       Row Name 12/04/23 1119          Balance    Dynamic Standing Balance verbal cues;contact guard;1-person assist  -AS     Position/Device Used, Standing Balance supported;cane, straight  -AS     Comment, Balance CGA for safety  -AS               User Key  (r) = Recorded By, (t) = Taken By, (c) = Cosigned By      Initials Name Provider Type    AS Yennifer Reyes PTA Physical Therapist Assistant                   Goals/Plan    No documentation.                  Clinical Impression       Row Name 12/04/23 1120          Pain    Pretreatment Pain Rating 3/10  -AS     Posttreatment Pain Rating 3/10  -AS     Pain Location - Side/Orientation Bilateral  -AS     Pain Location - knee  -AS     Pain Intervention(s) Repositioned;Ambulation/increased activity  -AS       Row Name 12/04/23 1120          Plan of Care Review    Plan of Care Reviewed With patient  -AS     Progress improving  -AS     Outcome Evaluation patient ambulated 25' + 25' with CGA x1 and STC for support, cues for cane placement and sequencing. Patient reported decreased knee pain with use of cane. With education on proper use of cane patient demonstrated good gait mechancis and no LOB or unsteadiness noticed. Recommend home with assist, use of STC for mobility and HHPT.  -AS       Row Name 12/04/23 1120          Positioning and Restraints    Pre-Treatment Position sitting in chair/recliner  -AS     Post Treatment Position chair  -AS     In Chair reclined;call light within reach;encouraged to call for assist;waffle cushion;legs  elevated  -AS               User Key  (r) = Recorded By, (t) = Taken By, (c) = Cosigned By      Initials Name Provider Type    AS Yennifer Reyes, JEANIE Physical Therapist Assistant                   Outcome Measures       Row Name 12/04/23 1121          How much help from another person do you currently need...    Turning from your back to your side while in flat bed without using bedrails? 4  -AS     Moving from lying on back to sitting on the side of a flat bed without bedrails? 4  -AS     Moving to and from a bed to a chair (including a wheelchair)? 3  -AS     Standing up from a chair using your arms (e.g., wheelchair, bedside chair)? 3  -AS     Climbing 3-5 steps with a railing? 3  -AS     To walk in hospital room? 3  -AS     AM-PAC 6 Clicks Score (PT) 20  -AS     Highest Level of Mobility Goal 6 --> Walk 10 steps or more  -AS       Row Name 12/04/23 1121 12/04/23 1104       Functional Assessment    Outcome Measure Options AM-PAC 6 Clicks Basic Mobility (PT)  -AS AM-PAC 6 Clicks Daily Activity (OT)  -NILE              User Key  (r) = Recorded By, (t) = Taken By, (c) = Cosigned By      Initials Name Provider Type    AS Yennifer Reyes PTA Physical Therapist Assistant    Whitney Newman OT Occupational Therapist                                 Physical Therapy Education       Title: PT OT SLP Therapies (In Progress)       Topic: Physical Therapy (In Progress)       Point: Mobility training (In Progress)       Learning Progress Summary             Patient Acceptance, E, NR by AS at 12/4/2023 1121    Acceptance, E, VU,NR by  at 12/2/2023 1543                         Point: Home exercise program (In Progress)       Learning Progress Summary             Patient Acceptance, E, NR by AS at 12/4/2023 1121                         Point: Body mechanics (In Progress)       Learning Progress Summary             Patient Acceptance, E, NR by AS at 12/4/2023 1121    Acceptance, E, VU,NR by  at 12/2/2023 1543                          Point: Precautions (In Progress)       Learning Progress Summary             Patient Acceptance, E, NR by AS at 12/4/2023 1121    Acceptance, E, VU,NR by  at 12/2/2023 1543                                         User Key       Initials Effective Dates Name Provider Type Discipline    AS 04/28/23 -  Yennifer Reyes PTA Physical Therapist Assistant PT     09/21/23 -  Lianne Madrid PT Physical Therapist PT                  PT Recommendation and Plan     Plan of Care Reviewed With: patient  Progress: improving  Outcome Evaluation: patient ambulated 25' + 25' with CGA x1 and STC for support, cues for cane placement and sequencing. Patient reported decreased knee pain with use of cane. With education on proper use of cane patient demonstrated good gait mechancis and no LOB or unsteadiness noticed. Recommend home with assist, use of STC for mobility and HHPT.     Time Calculation:         PT Charges       Row Name 12/04/23 1121             Time Calculation    Start Time 1050  -AS      PT Received On 12/04/23  -AS      PT Goal Re-Cert Due Date 12/12/23  -AS         Timed Charges    85273 - PT Therapeutic Exercise Minutes 8  -AS      05018 - Gait Training Minutes  15  -AS         Total Minutes    Timed Charges Total Minutes 23  -AS       Total Minutes 23  -AS                User Key  (r) = Recorded By, (t) = Taken By, (c) = Cosigned By      Initials Name Provider Type    AS Yennifer Reyes PTA Physical Therapist Assistant                  Therapy Charges for Today       Code Description Service Date Service Provider Modifiers Qty    0371954 HC PT THER PROC EA 15 MIN 12/4/2023 Yennifer Reyes PTA GP 1    07479742509 HC GAIT TRAINING EA 15 MIN 12/4/2023 Yennifer Reyes PTA GP 1            PT G-Codes  Outcome Measure Options: AM-PAC 6 Clicks Basic Mobility (PT)  AM-PAC 6 Clicks Score (PT): 20  AM-PAC 6 Clicks Score (OT): 20       Yennifer Reyes PTA  12/4/2023

## 2023-12-04 NOTE — PLAN OF CARE
Problem: Adult Inpatient Plan of Care  Goal: Plan of Care Review  Outcome: Ongoing, Progressing  Goal: Patient-Specific Goal (Individualized)  Outcome: Ongoing, Progressing  Goal: Absence of Hospital-Acquired Illness or Injury  Outcome: Ongoing, Progressing  Intervention: Identify and Manage Fall Risk  Description: Perform standard risk assessment on admission using a validated tool or comprehensive approach appropriate to the patient; reassess fall risk frequently, with change in status or transfer to another level of care.  Communicate fall injury risk to interprofessional healthcare team.  Determine need for increased observation, equipment and environmental modification, such as low bed, signage and supportive, nonskid footwear.  Adjust safety measures to individual developmental age, stage and identified risk factors.  Reinforce the importance of safety and physical activity with patient and family.  Perform regular intentional rounding to assess need for position change, pain assessment and personal needs, including assistance with toileting.  Recent Flowsheet Documentation  Taken 12/3/2023 2000 by Radha Clifton RN  Safety Promotion/Fall Prevention:   assistive device/personal items within reach   lighting adjusted   safety round/check completed   nonskid shoes/slippers when out of bed   fall prevention program maintained  Intervention: Prevent Skin Injury  Description: Perform a screening for skin injury risk, such as pressure or moisture associated skin damage on admission and at regular intervals throughout hospital stay.  Keep all areas of skin (especially folds) clean and dry.  Maintain adequate skin hydration.  Relieve and redistribute pressure and protect bony prominences; implement measures based on patient-specific risk factors.  Match turning and repositioning schedule to clinical condition.  Encourage weight shift frequently; assist with reposition if unable to complete independently.  Float  heels off bed; avoid pressure on the Achilles tendon.  Keep skin free from extended contact with medical devices.  Encourage functional activity and mobility, as early as tolerated.  Use aids (e.g., slide boards, mechanical lift) during transfer.  Recent Flowsheet Documentation  Taken 12/3/2023 2000 by Radha Clifton RN  Body Position: position changed independently  Intervention: Prevent and Manage VTE (Venous Thromboembolism) Risk  Description: Assess for VTE (venous thromboembolism) risk.  Encourage and assist with early ambulation.  Initiate and maintain compression or other therapy, as indicated, based on identified risk in accordance with organizational protocol and provider order.  Encourage both active and passive leg exercises while in bed, if unable to ambulate.  Recent Flowsheet Documentation  Taken 12/3/2023 2000 by Radha Clifton RN  Activity Management: activity encouraged  Intervention: Prevent Infection  Description: Maintain skin and mucous membrane integrity; promote hand, oral and pulmonary hygiene.  Optimize fluid balance, nutrition, sleep and glycemic control to maximize infection resistance.  Identify potential sources of infection early to prevent or mitigate progression of infection (e.g., wound, lines, devices).  Evaluate ongoing need for invasive devices; remove promptly when no longer indicated.  Recent Flowsheet Documentation  Taken 12/3/2023 2000 by Radha Clifton RN  Infection Prevention:   hand hygiene promoted   rest/sleep promoted   single patient room provided  Goal: Optimal Comfort and Wellbeing  Outcome: Ongoing, Progressing  Intervention: Provide Person-Centered Care  Description: Use a family-focused approach to care.  Develop trust and rapport by proactively providing information, encouraging questions, addressing concerns and offering reassurance.  Acknowledge emotional response to hospitalization.  Recognize and utilize personal coping strategies.  Honor spiritual and  cultural preferences.  Recent Flowsheet Documentation  Taken 12/3/2023 2000 by Radha Clifton RN  Trust Relationship/Rapport: care explained  Goal: Readiness for Transition of Care  Outcome: Ongoing, Progressing     Problem: Diabetes Comorbidity  Goal: Blood Glucose Level Within Targeted Range  Outcome: Ongoing, Progressing     Problem: Hypertension Comorbidity  Goal: Blood Pressure in Desired Range  Outcome: Ongoing, Progressing     Problem: Adjustment to Illness (Sepsis/Septic Shock)  Goal: Optimal Coping  Outcome: Ongoing, Progressing  Intervention: Optimize Psychosocial Adjustment to Illness  Description: Acknowledge, normalize, validate intensity and complexity of patient and support system response to situation.  Provide opportunity for expression of thoughts, feelings and concerns; respond with compassion and reassurance.  Decrease stress and anxiety by providing information about patient’s status and treatment.  Facilitate support system presence and participation in care; consider providing a diary in intensive care situation.  Support coping by recognizing current coping strategies; provide aid in developing new strategies.  Acknowledge and normalize difficulty in managing life-long lifestyle changes and expectations.  Assess and monitor for signs and symptoms of psychologic distress, anxiety and depression.  Consider palliative care consult for goals of care conversation, if the condition is worsening despite treatment.  Recent Flowsheet Documentation  Taken 12/3/2023 2000 by Radha Clifton, RN  Family/Support System Care:   support provided   self-care encouraged     Problem: Bleeding (Sepsis/Septic Shock)  Goal: Absence of Bleeding  Outcome: Ongoing, Progressing     Problem: Glycemic Control Impaired (Sepsis/Septic Shock)  Goal: Blood Glucose Level Within Desired Range  Outcome: Ongoing, Progressing     Problem: Infection Progression (Sepsis/Septic Shock)  Goal: Absence of Infection Signs and  Symptoms  Outcome: Ongoing, Progressing  Intervention: Initiate Sepsis Management  Description: Provide fluid therapy, such as crystalloid or albumin, to increase intravascular volume, organ perfusion and oxygen delivery.  Provide respiratory support, such as oxygen therapy, noninvasive or invasive positive pressure ventilation, to achieve oxygenation and ventilation goal; avoid hyperoxemia.  Obtain cultures prior to initiating antimicrobial therapy when possible. Do not delay for laboratory results in the presence of high suspicion or clinical indicators.  Administer intravenous broad-spectrum antimicrobial therapy promptly.  Implement hemodynamic monitoring to guide intravascular support based on individual targeted parameters.  Determine and address underlying source of infection aggressively; implement transmission-based precautions and isolation, as indicated.  Recent Flowsheet Documentation  Taken 12/3/2023 2000 by Radha Clifton RN  Infection Prevention:   hand hygiene promoted   rest/sleep promoted   single patient room provided  Isolation Precautions:   precautions maintained   contact  Intervention: Promote Recovery  Description: Encourage pulmonary hygiene, such as cough-enhancement and airway-clearance techniques, that may include use of incentive spirometry, deep breathing and cough.  Encourage early rehabilitation and physical activity to optimize functional ability and activity tolerance, as well as minimize delirium.  Promote energy conservation; minimize oxygen demand and consumption by adjusting environment, decreasing stimulation, maintaining normothermia and treating pain.  Optimize fluid balance, nutrition intake, sleep and glycemic control to maintain tissue perfusion and enhance immune response.  Recent Flowsheet Documentation  Taken 12/3/2023 2000 by Radha Clifton, RN  Activity Management: activity encouraged     Problem: Nutrition Impaired (Sepsis/Septic Shock)  Goal: Optimal Nutrition  Intake  Outcome: Ongoing, Progressing     Problem: Fall Injury Risk  Goal: Absence of Fall and Fall-Related Injury  Outcome: Ongoing, Progressing  Intervention: Promote Injury-Free Environment  Description: Provide a safe, barrier-free environment that encourages independent activity.  Keep care area uncluttered and well-lighted.  Determine need for increased observation or monitoring.  Avoid use of devices that minimize mobility, such as restraints or indwelling urinary catheter.  Recent Flowsheet Documentation  Taken 12/3/2023 2000 by Radha Clifton RN  Safety Promotion/Fall Prevention:   assistive device/personal items within reach   lighting adjusted   safety round/check completed   nonskid shoes/slippers when out of bed   fall prevention program maintained   Goal Outcome Evaluation:

## 2023-12-04 NOTE — CASE MANAGEMENT/SOCIAL WORK
Case Management Discharge Note      Final Note: Pt is being discharged home today. Pt is going to his clinic in [facility]. He sees Dr. Betts. I spoke with APOORVA Ford, at the clinic. She states that Dr. Mcgarry has agreed to administer Mr. Barrera's IV Invanz, daily, through Dec 11. Liliana also states that they have set up OPPT for Mr. Barrera through [employer] [facility]. I faxed Liliana Min's most recent note and orders for IV Antibiotics to [phone]. I updated pt's RN/Lexy, by phone. I updated Dr. Onofre, through messaging. I updated pt, at the bedside. Pt is driving himself home. Lexy/RN let pt know to call his clinic today to see what time they want him to come in tomorrow and to ask for APOORVA Ford. No other needs identified. I paged Dr. Min, [facility], and updated him, by phone.         Selected Continued Care - Admitted Since 12/1/2023       Destination    No services have been selected for the patient.                Durable Medical Equipment    No services have been selected for the patient.                Dialysis/Infusion    No services have been selected for the patient.                Home Medical Care    No services have been selected for the patient.                Therapy    No services have been selected for the patient.                Community Resources    No services have been selected for the patient.                Community & DME    No services have been selected for the patient.                         Final Discharge Disposition Code: 01 - home or self-care

## 2023-12-04 NOTE — THERAPY EVALUATION
Patient Name: Antonino Barrera  : 1955    MRN: [id number]                              Today's Date: 2023       Admit Date: 2023    Visit Dx:     ICD-10-CM ICD-9-CM   1. Pyelonephritis  N12 590.80   2. Sepsis, due to unspecified organism, unspecified whether acute organ dysfunction present  A41.9 038.9     995.91   3. Fever in adult  R50.9 780.60     Patient Active Problem List   Diagnosis    Right ureteral stone    Right upper quadrant abdominal pain    HTN (hypertension)    Gall stones    Cholecystitis    Hypotension    Hematoma/Post Op bleeding    Anemia, ABL from surgery + dilutional    Bacteremia     Past Medical History:   Diagnosis Date    DVT of leg (deep venous thrombosis)     over 10 years ago after trauma to left leg     Hypertension     Kidney stone     Meniere disease      Past Surgical History:   Procedure Laterality Date    CHOLECYSTECTOMY N/A 2/15/2017    Procedure: CHOLECYSTECTOMY LAPAROSCOPIC ,UMBILICAL HERNIA REPAIR ;  Surgeon: Ryan Corona MD;  Location:  [facility] OR;  Service:     CHOLECYSTECTOMY N/A 2017    Procedure: LAPRASCOPIC EVACUATION OF ABD HEMATOMA;  Surgeon: Ryan Corona MD;  Location:  [facility];  Service:     KNEE SURGERY        General Information       Row Name 23 1048          OT Time and Intention    Document Type evaluation  -NILE     Mode of Treatment occupational therapy  -NILE       Row Name 23 1048          General Information    Patient Profile Reviewed yes  -NILE     Prior Level of Function independent:;ADL's;bed mobility;transfer;all household mobility;community mobility;home management;driving;shopping;using stairs;work  -NILE     Existing Precautions/Restrictions fall;other (see comments)  Contact  -NILE     Barriers to Rehab none identified  -NILE       Row Name 23 1048          Occupational Profile    Environmental Supports and Barriers (Occupational Profile) Pt ambulates without AD at baseline; has walk-in shower with built-in  bench, grab bars; retired from construction but still works occasionally  -       Row Name 12/04/23 1048          Living Environment    People in Home alone  -NILE       Row Name 12/04/23 1048          Home Main Entrance    Number of Stairs, Main Entrance two  -NILE     Stair Railings, Main Entrance railings on both sides of stairs  -NILE       Row Name 12/04/23 1048          Stairs Within Home, Primary    Number of Stairs, Within Home, Primary none  -NILE       Row Name 12/04/23 1048          Cognition    Orientation Status (Cognition) oriented x 3  -NILE       Row Name 12/04/23 1048          Safety Issues, Functional Mobility    Impairments Affecting Function (Mobility) balance;endurance/activity tolerance;strength  -               User Key  (r) = Recorded By, (t) = Taken By, (c) = Cosigned By      Initials Name Provider Type    Whitney Newman OT Occupational Therapist                     Mobility/ADL's       Row Name 12/04/23 1050          Bed Mobility    Bed Mobility supine-sit  -     Supine-Sit [place] (Bed Mobility) modified independence  -     Assistive Device (Bed Mobility) head of bed elevated;bed rails  -NILE     Comment, (Bed Mobility) completed without difficulty  -       Row Name 12/04/23 1050          Transfers    Transfers sit-stand transfer;toilet transfer  -       Row Name 12/04/23 1050          Sit-Stand Transfer    Sit-Stand [place] (Transfers) contact guard  -     Comment, (Sit-Stand Transfer) no AD  -       Row Name 12/04/23 1050          Toilet Transfer    Type (Toilet Transfer) stand-sit;sit-stand;stand pivot/stand step  -     [place] Level (Toilet Transfer) supervision  -     Assistive Device (Toilet Transfer) commode;grab bars/safety frame  -       Row Name 12/04/23 1050          Functional Mobility    Functional Mobility- Ind. Level contact guard assist  -NILE     Functional Mobility-Distance (Feet) 25  -NILE     Functional Mobility- Comment Pt ambulated short  distances in room without AD  -[facility] Name 12/04/23 1050          Activities of Daily Living    BADL Assessment/Intervention lower body dressing;grooming;toileting  -[facility] Name 12/04/23 1050          Lower Body Dressing Assessment/Training    [place] Level (Lower Body Dressing) doff;don;socks;supervision  -     Position (Lower Body Dressing) edge of bed sitting  -       Row Name 12/04/23 1050          Grooming Assessment/Training    [place] Level (Grooming) hair care, combing/brushing;oral care regimen;wash face, hands;standby assist  -     Position (Grooming) sink side;unsupported standing  -[facility] Name 12/04/23 1050          Toileting Assessment/Training    [place] Level (Toileting) adjust/manage clothing;perform perineal hygiene;standby assist  -     Assistive Devices (Toileting) commode;grab bar/safety frame  -     Position (Toileting) unsupported sitting;supported standing  -               User Key  (r) = Recorded By, (t) = Taken By, (c) = Cosigned By      Initials Name Provider Type    Whitney Newman OT Occupational Therapist                   Obj/Interventions       [facility] Name 12/04/23 1054          Sensory Assessment (Somatosensory)    Sensory Assessment (Somatosensory) UE sensation intact  -[facility] Name 12/04/23 1054          Vision Assessment/Intervention    Visual Impairment/Limitations WFL;corrective lenses full-time  -[facility] Name 12/04/23 1054          Range of Motion Comprehensive    General Range of Motion bilateral upper extremity ROM WFL  -     Comment, General Range of Motion B shoulders limited to 95 degrees elevation  -[facility] Name 12/04/23 1054          Strength Comprehensive (MMT)    Comment, General Manual Muscle Testing (MMT) Assessment BUE's grossly 4/5  -[facility] Name 12/04/23 1054          Balance    Balance Assessment sitting static balance;sitting dynamic balance;standing static balance;standing dynamic balance  -      Static Sitting Balance independent  -NILE     Dynamic Sitting Balance standby assist  -NILE     Position, Sitting Balance unsupported;sitting edge of bed  -NILE     Static Standing Balance standby assist  -NILE     Dynamic Standing Balance contact guard  -NILE     Position/Device Used, Standing Balance unsupported  -NILE     Balance Interventions standing;dynamic;occupation based/functional task  -NILE     Comment, Balance SBA for toileting tasks in standing.  -NILE               User Key  (r) = Recorded By, (t) = Taken By, (c) = Cosigned By      Initials Name Provider Type    Whitney Newman, OT Occupational Therapist                   Goals/Plan       Row Name 12/04/23 1103          Transfer Goal 1 (OT)    Activity/Assistive Device (Transfer Goal 1, OT) sit-to-stand/stand-to-sit;toilet  -NILE     [place] Level/Cues Needed (Transfer Goal 1, OT) modified independence  -NILE     Time Frame (Transfer Goal 1, OT) long term goal (LTG);10 days  -NILE     Progress/Outcome (Transfer Goal 1, OT) new goal  -NILE       Row Name 12/04/23 1103          Toileting Goal 1 (OT)    Activity/Device (Toileting Goal 1, OT) adjust/manage clothing;perform perineal hygiene;commode;grab bar/safety frame  -NILE     [place] Level/Cues Needed (Toileting Goal 1, OT) modified independence  -INLE     Time Frame (Toileting Goal 1, OT) long term goal (LTG);10 days  -NILE     Progress/Outcome (Toileting Goal 1, OT) new goal  -NILE       Row Name 12/04/23 1103          Grooming Goal 1 (OT)    Activity/Device (Grooming Goal 1, OT) hair care;oral care;wash face, hands  -NILE     [place] (Grooming Goal 1, OT) modified independence  -NILE     Time Frame (Grooming Goal 1, OT) long term goal (LTG);10 days  -NILE     Strategies/Barriers (Grooming Goal 1, OT) standing sink side  -NILE     Progress/Outcome (Grooming Goal 1, OT) new goal  -NILE       Row Name 12/04/23 1103          Therapy Assessment/Plan (OT)    Planned Therapy Interventions (OT) activity tolerance training;BADL  retraining;functional balance retraining;occupation/activity based interventions;patient/caregiver education/training;ROM/therapeutic exercise;strengthening exercise;transfer/mobility retraining  -               User Key  (r) = Recorded By, (t) = Taken By, (c) = Cosigned By      Initials Name Provider Type    Whitney Newman OT Occupational Therapist                   Clinical Impression       Row Name 12/04/23 1056          Pain Scale: FACES Pre/Post-Treatment    Pain: FACES Scale, Pretreatment 0-->no hurt  -NILE     Posttreatment Pain Rating 2-->hurts little bit  -NILE     Pain Location - Side/Orientation Bilateral  -     Pain Location - knee  -NILE     Pre/Posttreatment Pain Comment Tolerated  -       Row Name 12/04/23 1056          Plan of Care Review    Plan of Care Reviewed With patient  -     Outcome Evaluation OT eval completed. Pt presents below baseline for ADL performance, limited by generalized weakness and mild balance impairment. Pt CGA to ambulate to bathroom, SBA for toilet transfer and toileting tasks in standing. Pt completed grooming standing at sink with SBA. IP OT services warranted. Recommend home with assist and HHOT at discharge.  -       Row Name 12/04/23 1056          Therapy Assessment/Plan (OT)    Rehab Potential (OT) good, to achieve stated therapy goals  -     Criteria for Skilled Therapeutic Interventions Met (OT) yes;meets criteria;skilled treatment is necessary  -     Therapy Frequency (OT) daily  -       Row Name 12/04/23 1056          Therapy Plan Review/Discharge Plan (OT)    Anticipated Discharge Disposition (OT) home with assist;home with home health  -       Row Name 12/04/23 1056          Vital Signs    Pre Systolic BP Rehab 124  -NILE     Pre Treatment Diastolic BP 72  -NILE     Intra Systolic BP Rehab 135  -NILE     Intra Treatment Diastolic BP 85  -NILE     Pretreatment Heart Rate (beats/min) 74  -NILE     Intratreatment Heart Rate (beats/min) 99  -NILE     Posttreatment  Heart Rate (beats/min) 84  -NILE     O2 Delivery Pre Treatment room air  -NILE     O2 Delivery Intra Treatment room air  -NILE     O2 Delivery Post Treatment room air  -NILE     Pre Patient Position Supine  -NILE     Intra Patient Position Standing  -NILE     Post Patient Position Sitting  -NILE       Row Name 12/04/23 1056          Positioning and Restraints    Pre-Treatment Position in bed  -NILE     Post Treatment Position chair  -NILE     In Chair notified nsg;reclined;call light within reach;encouraged to call for assist;waffle cushion;legs elevated  -NILE               User Key  (r) = Recorded By, (t) = Taken By, (c) = Cosigned By      Initials Name Provider Type    Whitney Newman OT Occupational Therapist                   Outcome Measures       Row Name 12/04/23 1104          How much help from another is currently needed...    Putting on and taking off regular lower body clothing? 3  -NILE     Bathing (including washing, rinsing, and drying) 3  -NILE     Toileting (which includes using toilet bed pan or urinal) 3  -NILE     Putting on and taking off regular upper body clothing 3  -NILE     Taking care of personal grooming (such as brushing teeth) 4  -NILE     Eating meals 4  -NILE     AM-PAC 6 Clicks Score (OT) 20  -NILE       Row Name 12/04/23 1104          Functional Assessment    Outcome Measure Options AM-PAC 6 Clicks Daily Activity (OT)  -NILE               User Key  (r) = Recorded By, (t) = Taken By, (c) = Cosigned By      Initials Name Provider Type    Whitney Newman OT Occupational Therapist                    Occupational Therapy Education       Title: PT OT SLP Therapies (In Progress)       Topic: Occupational Therapy (In Progress)       Point: ADL training (Done)       Description:   Instruct learner(s) on proper safety adaptation and remediation techniques during self care or transfers.   Instruct in proper use of assistive devices.                  Learning Progress Summary             Patient Acceptance, E, VU by NILE at  12/4/2023 1105    Comment: OT POC; energy conservation/pacing; non-pharmacological pain management strategies                         Point: Home exercise program (Not Started)       Description:   Instruct learner(s) on appropriate technique for monitoring, assisting and/or progressing therapeutic exercises/activities.                  Learner Progress:  Not documented in this visit.              Point: Precautions (Done)       Description:   Instruct learner(s) on prescribed precautions during self-care and functional transfers.                  Learning Progress Summary             Patient Acceptance, E, VU by NILE at 12/4/2023 1105    Comment: OT POC; energy conservation/pacing; non-pharmacological pain management strategies                         Point: Body mechanics (Not Started)       Description:   Instruct learner(s) on proper positioning and spine alignment during self-care, functional mobility activities and/or exercises.                  Learner Progress:  Not documented in this visit.                              User Key       Initials Effective Dates Name Provider Type Discipline    NILE 06/16/21 -  Whitney Zavala OT Occupational Therapist OT                  OT Recommendation and Plan  Planned Therapy Interventions (OT): activity tolerance training, BADL retraining, functional balance retraining, occupation/activity based interventions, patient/caregiver education/training, ROM/therapeutic exercise, strengthening exercise, transfer/mobility retraining  Therapy Frequency (OT): daily  Plan of Care Review  Plan of Care Reviewed With: patient  Outcome Evaluation: OT eval completed. Pt presents below baseline for ADL performance, limited by generalized weakness and mild balance impairment. Pt CGA to ambulate to bathroom, SBA for toilet transfer and toileting tasks in standing. Pt completed grooming standing at sink with SBA. IP OT services warranted. Recommend home with assist and HHOT at discharge.      Time Calculation:   Evaluation Complexity (OT)  Review Occupational Profile/Medical/Therapy History Complexity: expanded/moderate complexity  Assessment, Occupational Performance/Identification of Deficit Complexity: 3-5 performance deficits  Clinical Decision Making Complexity (OT): detailed assessment/moderate complexity  Overall Complexity of Evaluation (OT): moderate complexity     Time Calculation- OT       Row Name 12/04/23 0930             Time Calculation- OT    OT Start Time 0930  -NILE      OT Received On 12/04/23  -NILE      OT Goal Re-Cert Due Date 12/14/23  -NILE         Untimed Charges    OT Eval/Re-eval Minutes 52  -NILE         Total Minutes    Untimed Charges Total Minutes 52  -NILE       Total Minutes 52  -NILE                User Key  (r) = Recorded By, (t) = Taken By, (c) = Cosigned By      Initials Name Provider Type    Whitney Newman OT Occupational Therapist                  Therapy Charges for Today       Code Description Service Date Service Provider Modifiers Qty    [id number] HC OT EVAL MOD COMPLEXITY 4 12/4/2023 Whitney Zavala OT GO 1                 Whitney Zavala OT  12/4/2023

## 2023-12-04 NOTE — DISCHARGE SUMMARY
[facility] Medicine Services  DISCHARGE SUMMARY    Patient Name: Antonino Barrera  : 1955  MRN: [id number]    Date of Admission: 2023 10:58 AM  Date of Discharge:  2023  Primary Care Physician: Jose A Mcgarry MD    Consults       Date and Time Order Name Status Description    2023  1:26 PM Inpatient Infectious Diseases Consult Completed             Hospital Course     Presenting Problem: Rigors     Active Hospital Problems    Diagnosis  POA    **Bacteremia [R78.81]  Yes      Resolved Hospital Problems   No resolved problems to display.          Hospital Course:  Antonino Barrera is a 68 y.o. male  is a 68 y.o. male with a past medical history of gout, HTN and DM2 that presented to the ED complaining of fever/chills and sweats with rigors.      This patient's problems and plans were partially entered by my partner and updated as appropriate by me 23.     Sepsis  ESBL E. Coli BActeremia  Pyelonephritis  -ID following  - continue Ertapenem 1gm IV daily x 7-10 days  - f/u with [facility] for daily infusions  - BC + for ESBL E. Coli  - urine culture negative     Hyponatremia  - 126 on admission  - improved to 131 this am post IVF     Rigors/fevers/chills  - related to sepsis     Leukocytosis  - 15 on admission- improved to 7 this am     Dysphagia?  - coughing after eating  - SLP evaluated and patient passed swallow evaluation     Diarrhea  - chronic per patient.     Gout  - continue home meds     Hx of HTN  - continue home meds     DM2  - continue home meds    Discharge Follow Up Recommendations for outpatient labs/diagnostics:  - continue Ertapenem 1gm IV daily x 7-10 days  - f/u with [facility] for daily infusions    Day of Discharge     HPI:   Patient stable, feeling much better.     Review of Systems  Gen- No fevers, chills  CV- No chest pain, palpitations  Resp- No cough, dyspnea  GI- No N/V/D, abd pain    Vital Signs:   Temp:  [97.5 °F (36.4 °C)-98.7  °F (37.1 °C)] 97.5 °F (36.4 °C)  Heart Rate:  [] 74  Resp:  [18-30] 18  BP: (115-137)/(72-85) 115/76      Physical Exam:  Constitutional: No acute distress, awake, alert  HENT: NCAT, mucous membranes moist  Respiratory: Clear to auscultation bilaterally, respiratory effort normal   Cardiovascular: RRR, no murmurs, rubs, or gallops  Gastrointestinal: Positive bowel sounds, soft, mildly diffusely tender, nondistended  Musculoskeletal: No bilateral ankle edema  Psychiatric: Appropriate affect, cooperative  Neurologic: Oriented x 3, strength symmetric in all extremities, Cranial Nerves grossly intact to confrontation, speech clear  Skin: No rashes    Pertinent  and/or Most Recent Results     LAB RESULTS:      Lab 12/03/23 0413 12/02/23 0349 12/01/23 1152 11/30/23  1253   WBC 7.93 11.59* 15.87* 19.33*   HEMOGLOBIN 12.1* 12.1* 12.8* 14.5   HEMATOCRIT 35.3* 35.4* 36.6* 42.8   PLATELETS 162 164 153 194   NEUTROS ABS  --  9.20* 13.44* 16.55*   IMMATURE GRANS (ABS)  --  0.11* 0.27* 0.23*   LYMPHS ABS  --  0.69* 0.59* 0.64*   MONOS ABS  --  1.36* 1.53* 1.84*   EOS ABS  --  0.19 0.01 0.01   MCV 88.0 89.2 88.8 88.2   PROCALCITONIN  --   --  10.41*  --    LACTATE  --   --  1.2 1.6         Lab 12/03/23 0413 12/02/23 0349 12/01/23  1152 11/30/23  1253   SODIUM 131* 131* 126* 132*   POTASSIUM 3.7 4.2 3.0* 3.3*   CHLORIDE 100 99 94* 97*   CO2 19.0* 22.0 21.0* 22.0   ANION GAP 12.0 10.0 11.0 13.0   BUN 16 19 20 18   CREATININE 1.15 1.29* 1.38* 1.37*   EGFR 68.9 60.0* 55.4* 55.8*   GLUCOSE 84 81 116* 144*   CALCIUM 9.4 9.3 8.8 9.2         Lab 12/03/23  0413 12/02/23  0349 12/01/23  1152 11/30/23  1253   TOTAL PROTEIN 4.9* 5.6* 5.8* 6.6   ALBUMIN 3.0* 3.1* 3.2* 3.8   GLOBULIN 1.9 2.5 2.6 2.8   ALT (SGPT) 16 11 15 19   AST (SGOT) 28 17 17 24   BILIRUBIN 0.8 1.4* 1.5* 2.1*   ALK PHOS 76 77 76 92   LIPASE  --   --   --  13         Lab 12/01/23  1152   HSTROP T 17                 Brief Urine Lab Results  (Last result in the past  365 days)        Color   Clarity   Blood   Leuk Est   Nitrite   Protein   CREAT   Urine HCG        12/01/23 1329 Yellow   Clear   Small (1+)   Small (1+)   Negative   100 mg/dL (2+)                 Microbiology Results (last 10 days)       Procedure Component Value - Date/Time    Urine Culture - Urine, Urine, Clean Catch [[id number]]  (Normal) Collected: 12/01/23 1329    Lab Status: Final result Specimen: Urine, Clean Catch Updated: 12/02/23 1225     Urine Culture No growth    Blood Culture - Blood, Hand, Right [[id number]]  (Abnormal) Collected: 12/01/23 1150    Lab Status: Final result Specimen: Blood from Hand, Right Updated: 12/04/23 0619     Blood Culture Escherichia coli ESBL     Comment:   Consider infectious disease consult.  Susceptibility results may not correlate to clinical outcomes.  For ESBL-producing infections in the blood, a carbapenem is recommended as first-line therapy for optimal clinical outcomes.        Isolated from --     Gram Stain Aerobic Bottle Gram negative bacilli    Narrative:      Refer to previous blood culture collected on 11/30/2023 1253 for MICs.    Blood Culture - Blood, Arm, Right [[id number]]  (Abnormal) Collected: 12/01/23 1145    Lab Status: Final result Specimen: Blood from Arm, Right Updated: 12/04/23 0617     Blood Culture Escherichia coli ESBL     Comment:   Consider infectious disease consult.  Susceptibility results may not correlate to clinical outcomes.  For ESBL-producing infections in the blood, a carbapenem is recommended as first-line therapy for optimal clinical outcomes.        Isolated from --     Gram Stain Aerobic Bottle Gram negative bacilli    Narrative:      Refer to previous blood culture collected on 11/30/2023 1253 for MICs.    Blood Culture - Blood, Arm, Right [[id number]]  (Normal) Collected: 11/30/23 1452    Lab Status: Preliminary result Specimen: Blood from Arm, Right Updated: 12/03/23 1515     Blood Culture No growth at 3 days    Blood Culture - Blood,  Arm, Left [[id number]]  (Abnormal)  (Susceptibility) Collected: 11/30/23 1253    Lab Status: Final result Specimen: Blood from Arm, Left Updated: 12/04/23 0617     Blood Culture Escherichia coli ESBL     Comment:   Consider infectious disease consult.  Susceptibility results may not correlate to clinical outcomes.  For ESBL-producing infections in the blood, a carbapenem is recommended as first-line therapy for optimal clinical outcomes.        Isolated from Aerobic Bottle     Gram Stain Aerobic Bottle Gram negative bacilli    Narrative:            Susceptibility        Escherichia coli ESBL      FLORI      Ertapenem Susceptible      Meropenem Susceptible                       Susceptibility Comments       Escherichia coli ESBL    Cefazolin sensitivity will not be reported for Enterobacteriaceae in non-urine isolates. If cefazolin is preferred, please call the microbiology lab to request an E-test.  With the exception of urinary-sourced infections, aminoglycosides should not be used as monotherapy.               Blood Culture ID, PCR - Blood, Arm, Left [[id number]]  (Abnormal) Collected: 11/30/23 1253    Lab Status: Final result Specimen: Blood from Arm, Left Updated: 12/01/23 0656     BCID, PCR Escherichia coli. Identification by BCID2 PCR.     BCID, PCR 2 CTX-M (ESBL) detected. Identification by BCID2 PCR     BOTTLE TYPE Aerobic Bottle    Urine Culture - Urine, Urine, Clean Catch [[id number]] Collected: 11/30/23 1243    Lab Status: Final result Specimen: Urine, Clean Catch Updated: 12/02/23 1233     Urine Culture >100,000 CFU/mL Mixed Yoana Isolated    Narrative:      Specimen contains mixed organisms of questionable pathogenicity suggestive of contamination. If symptoms persist, suggest recollection.  Colonization of the urinary tract without infection is common. Treatment is discouraged unless the patient is symptomatic, pregnant, or undergoing an invasive urologic procedure.            XR Chest 1 View    Result Date:  12/1/2023  XR CHEST 1 VW Date of Exam: 12/1/2023 11:11 AM EST Indication: urosepsis Comparison: None available. Findings: Cardiomediastinal silhouette is borderline prominent, as imaged on this portable radiograph. Low lung volumes with hypoventilatory change including bibasilar atelectasis. No pleural effusion or pneumothorax. Osseous structures are unremarkable.     Impression: Low lung volumes with hypoventilatory change including bibasilar atelectasis. No focal consolidation. Electronically Signed: Jadiel Teran MD  12/1/2023 11:38 AM EST  Workstation ID: [id number]    CT Abdomen Pelvis With Contrast    Result Date: 11/30/2023  CT ABDOMEN PELVIS W CONTRAST Date of Exam: 11/30/2023 3:44 PM CST Indication: right flank pain fever , chills. Comparison: 2/18/2017 Technique: Axial CT images were obtained of the abdomen and pelvis following the uneventful intravenous administration of 95 cc Isovue-300. Reconstructed coronal and sagittal images were also obtained. Automated exposure control and iterative construction methods were used. Findings: LUNG BASES:  Unremarkable without mass or infiltrate. LIVER:  Unremarkable parenchyma without focal lesion. BILIARY/GALLBLADDER: Cholecystectomy SPLEEN:  Unremarkable PANCREAS:  Unremarkable ADRENAL:  Unremarkable KIDNEYS: Heterogeneous cortical enhancement involving the left kidney with perinephric and periureteral stranding. No evidence of obstruction. Imaging features suggestive of pyelonephritis. Right kidney is unremarkable in appearance. No calculus identified. GASTROINTESTINAL/MESENTERY:  No evidence of obstruction nor inflammation.  The appendix is normal. MESENTERIC VESSELS:  Patent. AORTA/IVC:  Normal caliber. RETROPERITONEUM/LYMPH NODES:  Unremarkable REPRODUCTIVE:  Unremarkable BLADDER:  Unremarkable OSSEUS STRUCTURES: Interval likely chronic T11 compression fracture.     Impression: 1.Imaging features are suggestive of left-sided pyelonephritis without evidence of  obstruction. Correlate with symptoms and lab values. 2.Interval likely chronic T11 compression fracture. Electronically Signed: Meng Browne MD  11/30/2023 4:23 PM CST  Workstation ID: [id number]                 Plan for Follow-up of Pending Labs/Results: per ID      Discharge Details        Discharge Medications        New Medications        Instructions Start Date   ertapenem 1,000 mg in sodium chloride 0.9 % 100 mL IVPB   1,000 mg, Intravenous, Every 24 Hours             Continue These Medications        Instructions Start Date   acetaminophen 325 MG tablet  Commonly known as: TYLENOL   650 mg, Oral, Every 4 Hours PRN, OTC      allopurinol 100 MG tablet  Commonly known as: ZYLOPRIM   100 mg, Oral, Daily      colchicine 0.6 MG tablet   0.6 mg, Oral, Daily      diphenhydrAMINE 25 mg capsule  Commonly known as: BENADRYL   25 mg, Oral, Every 6 Hours PRN, OTC      docusate sodium 100 MG capsule   100 mg, Oral, 2 Times Daily PRN      famotidine 20 MG tablet  Commonly known as: PEPCID   20 mg, Oral, 2 Times Daily      hydroCHLOROthiazide 12.5 MG tablet  Commonly known as: HYDRODIURIL   12.5 mg, Oral, Daily      levocetirizine 5 MG tablet  Commonly known as: XYZAL   5 mg, Oral, Every Evening      losartan-hydrochlorothiazide 100-25 MG per tablet  Commonly known as: HYZAAR   1 tablet, Oral, Daily      omeprazole 40 MG capsule  Commonly known as: priLOSEC   40 mg, Oral, Daily      ondansetron 4 MG tablet  Commonly known as: ZOFRAN   4 mg, Oral, Every 6 Hours PRN      Ozempic (0.25 or 0.5 MG/DOSE) 2 MG/1.5ML solution pen-injector  Generic drug: Semaglutide(0.25 or 0.5MG/DOS)   Subcutaneous, Weekly      valACYclovir 500 MG tablet  Commonly known as: VALTREX   1,000 mg, Oral, Daily             Stop These Medications      levoFLOXacin 500 MG tablet  Commonly known as: LEVAQUIN            ASK your doctor about these medications        Instructions Start Date   HYDROcodone-acetaminophen 5-325 MG per tablet  Commonly known as:  NORCO   2 tablets, Oral, Every 4 Hours PRN      hydrocortisone 1 % cream   Topical, Every 12 Hours Scheduled, To effected area as needed      lisinopril-hydrochlorothiazide 20-12.5 MG per tablet  Commonly known as: PRINZIDE,ZESTORETIC   1 tablet, Daily      sucralfate 1 g tablet  Commonly known as: CARAFATE   1 g, 4 Times Daily               No Active Allergies      Discharge Disposition:  Home or Self Care    Diet:  Hospital:  Diet Order   Procedures    Diet: Regular/House Diet; Texture: Regular Texture (IDDSI 7); Fluid Consistency: Thin (IDDSI 0)            Activity: as tolerated      Restrictions or Other Recommendations:  none       CODE STATUS:    Code Status and Medical Interventions:   Ordered at: 12/01/23 1328     Code Status (Patient has no pulse and is not breathing):    CPR (Attempt to Resuscitate)     Medical Interventions (Patient has pulse or is breathing):    Full Support       Additional Instructions for the Follow-ups that You Need to Schedule       Discharge Follow-up with Specified Provider: Dr. Pako HU, tomorrow 12/4/23 at 9:30am in their outpatient office   As directed      To: Dr. Pako HU, tomorrow 12/4/23 at 9:30am in their outpatient office        Discharge Follow-up with Specified Provider: [facility] for IV infusion   As directed      To: [facility] for IV infusion                      Linda Onofre DO  12/04/23      Time Spent on Discharge:  I spent  35  minutes on this discharge activity which included: face-to-face encounter with the patient, reviewing the data in the system, coordination of the care with the nursing staff as well as consultants, documentation, and entering orders.

## 2023-12-04 NOTE — PLAN OF CARE
Goal Outcome Evaluation:  Plan of Care Reviewed With: patient        Progress: improving  Outcome Evaluation: patient ambulated 25' + 25' with CGA x1 and STC for support, cues for cane placement and sequencing. Patient reported decreased knee pain with use of cane. With education on proper use of cane patient demonstrated good gait mechancis and no LOB or unsteadiness noticed. Recommend home with assist, use of STC for mobility and HHPT.

## 2023-12-04 NOTE — DISCHARGE PLACEMENT REQUEST
" [phone]    Cynthia Barrera (68 y.o. Male)       Date of Birth   1955    Social Security Number       Address   [address] [place] [address] [address] [place] [address]    Home Phone   [phone]    MRN   [id number]       Sikhism   None    Marital Status   Single                            Admission Date   12/1/23    Admission Type   Emergency    Admitting Provider   Linda Onofre DO    Attending Provider   Linda Onofre DO    Department, Room/Bed   [address], S579/1       Discharge Date       Discharge Disposition       Discharge Destination                                 Attending Provider: Linda Onofre DO    Allergies: No Active Allergies    Isolation: Contact   Infection: ESBL (12/01/23), ESBL E coli (12/03/23)   Code Status: CPR    Ht: 175.3 cm (69\")   Wt: 85.7 kg (189 lb)    Admission Cmt: None   Principal Problem: Bacteremia [R78.81]                   Active Insurance as of 12/1/2023       Primary Coverage       Payor Plan Insurance Group Employer/Plan Group    MEDICARE MEDICARE A & B        Payor Plan Address Payor Plan Phone Number Payor Plan Fax Number Effective Dates    [address] [phone]  5/1/2019 - None Entered    [facility] [address]         Subscriber Name Subscriber Birth Date Member ID       CYNTHIA BARRERA 1955 [id number]               Secondary Coverage       Payor Plan Insurance Group Employer/Plan Group    [place]        Payor Plan Address Payor Plan Phone Number Payor Plan Fax Number Effective Dates    [address] [phone]  11/30/2023 - None Entered    [place] [address]         Subscriber Name Subscriber Birth Date Member ID       CYNTHIA BARRERA 1955 [id number]                     Emergency Contacts        (Rel.) Home Phone Work Phone Mobile Phone    TRINA LLAMAS (Sister) [phone] -- [phone]           [address]  [address] [facility] " [address]  Phone:  [phone]  Fax:  [phone]        Patient: ROOM: [facility]   Antonino Barrera MRN:  [id number]   [address] [place] [address] [address] [place] [address] :  1955  SSN:    Phone: [phone] Sex:  M   PCP: Jose A Mcgarry                Emergency Contact Information      Name Relation Home Work Mobile     TRINA LLAMAS [phone]          INSURANCE PAYOR PLAN GROUP # SUBSCRIBER ID   Primary:  Secondary:    MEDICARE  [place] [id number]  [id number]      [id number]  [id number]   Admitting Diagnosis: Bacteremia [R78.81]  Order Date:  Dec 4, 2023        Case Management  Consult       (Order ID: [id number])     Diagnosis:         Priority:  Routine Expected Date:   Expiration Date:        Interval:   Count:    Comments: IV ertapenem 1 g daily for 7 more days through   Reason for Consult: outpatient IV antibiotic recommendation        Authorizing Provider:Sancho Min MD  Authorizing Provider's NPI: [id number]  Order Entered By: Sancho Min MD 2023  9:44 AM     Electronically signed by: Sancho Min MD 2023  9:44 AM            Consult Notes (most recent note)        Sancho Min MD at 23 1456        Consult Orders    1. Inpatient Infectious Diseases Consult [[id number]] ordered by Moisés Zelaya MD at 23 1326                   Patient Name: Antonino Barrera  : 1955  MRN: [id number]    Date of Consult: 2023  Admission Date: 2023    Requesting Provider: Garcia  Evaluating Physician: Sancho Min MD    Chief Complaint: fever    Reason for Consultation: bacteremia    Subjective   Patient is a 68 y.o. male with history of prior kidney stones and prior UTIs but no history of prostatitis.  Presented to [facility] emergency department  with 2 to 3 days of fevers, malaise, flank pain and difficulty urinating.  He was given dose ceftriaxone in emergency department and discharged  home on levofloxacin but continued to feel poorly.  Blood cultures returned positive for ESBL E. coli so he was called back to hospital for admission.  Started on ertapenem.  ID was asked to evaluate.  Patient still having high fever up to 102.3.  Ongoing flank discomfort but now seems to be voiding better.  He does not passed any kidney stones recently and no recent urological procedures    Listed history of allergy to Zosyn in 2017, which apparently was related to the time he had a surgical procedure and based on description sounds like he had more of a contact dermatitis from some sort of cleaning solution used in the OR rather than no systemic reaction to the antibiotic.  Patient says he has tolerated amoxicillin since then    Review of Systems  See HPI    Past Medical History:   Diagnosis Date    DVT of leg (deep venous thrombosis)     over 10 years ago after trauma to left leg     Hypertension     Kidney stone     Meniere disease        Past Surgical History:   Procedure Laterality Date    CHOLECYSTECTOMY N/A 2/15/2017    Procedure: CHOLECYSTECTOMY LAPAROSCOPIC ,UMBILICAL HERNIA REPAIR ;  Surgeon: Ryan Corona MD;  Location: [facility] OR;  Service:     CHOLECYSTECTOMY N/A 2/17/2017    Procedure: LAPRASCOPIC EVACUATION OF ABD HEMATOMA;  Surgeon: Ryan Corona MD;  Location: [facility] OR;  Service:     KNEE SURGERY         Social History     Socioeconomic History    Marital status: Single   Tobacco Use    Smoking status: Former   Substance and Sexual Activity    Alcohol use: Yes     Comment: occasional / on weekends     Drug use: No        History reviewed. No pertinent family history.    Allergies   Allergen Reactions    Zosyn [Piperacillin Sod-Tazobactam So] Rash     Has tolerated cefazolin, ceftriaxone       Objective   Antibiotics:  Anti-Infectives (From admission, onward)      Ordered     Dose/Rate Route Frequency Start Stop    12/01/23 1328  ertapenem (INVanz) 1,000 mg in sodium chloride 0.9 % 100  mL IVPB        Ordering Provider: Moisés Zelaya MD    1,000 mg  200 mL/hr over 30 Minutes Intravenous Every 24 Hours 12/02/23 1200 12/07/23 1159    12/01/23 1113  ertapenem (INVanz) 1,000 mg in sodium chloride 0.9 % 100 mL IVPB        Ordering Provider: Ramos Cedillo APRN    1,000 mg  200 mL/hr over 30 Minutes Intravenous Once 12/01/23 1129 12/01/23 1248            Other Medications:  Current Facility-Administered Medications   Medication Dose Route Frequency Provider Last Rate Last Admin    acetaminophen (TYLENOL) tablet 650 mg  650 mg Oral Q6H PRN Moisés Zelaya MD   650 mg at 12/01/23 1404    allopurinol (ZYLOPRIM) tablet 100 mg  100 mg Oral Daily Moisés Zelaya MD   100 mg at 12/01/23 1404    sennosides-docusate (PERICOLACE) 8.6-50 MG per tablet 2 tablet  2 tablet Oral BID Moisés Zelaya MD        And    polyethylene glycol (MIRALAX) packet 17 g  17 g Oral Daily PRN Moisés Zelaya MD        And    bisacodyl (DULCOLAX) EC tablet 5 mg  5 mg Oral Daily PRN Moisés Zelaya MD        And    bisacodyl (DULCOLAX) suppository 10 mg  10 mg Rectal Daily PRN Moisés Zelaya MD        Calcium Replacement - Follow Nurse / BPA Driven Protocol   Does not apply PRN Moisés Zelaya MD        cetirizine (zyrTEC) tablet 10 mg  10 mg Oral Daily Moisés Zelaya MD   10 mg at 12/01/23 1404    [START ON 12/2/2023] colchicine tablet 0.6 mg  0.6 mg Oral Daily Moisés Zelaya MD        dextrose (D50W) (25 g/50 mL) IV injection 25 g  25 g Intravenous Q15 Min PRN Moisés Zelaya MD        dextrose (GLUTOSE) oral gel 15 g  15 g Oral Q15 Min PRN Moisés Zelaya MD        [START ON 12/2/2023] ertapenem (INVanz) 1,000 mg in sodium chloride 0.9 % 100 mL IVPB  1,000 mg Intravenous Q24H Moisés Zelaya MD        famotidine (PEPCID) tablet 20 mg  20 mg Oral BID Moisés Zelaya MD        glucagon (GLUCAGEN) injection 1 mg  1 mg Intramuscular Q15 Min PRN Moisés Zelaya MD        heparin (porcine) 5000 UNIT/ML injection 5,000  "Units  5,000 Units Subcutaneous Q8H Moisés Zelaya MD   5,000 Units at 12/01/23 1404    Insulin Lispro (humaLOG) injection 2-7 Units  2-7 Units Subcutaneous 4x Daily AC & at Bedtime Moisés Zelaya MD        Magnesium Standard Dose Replacement - Follow Nurse / BPA Driven Protocol   Does not apply PRN Moisés Zelaya MD        ondansetron (ZOFRAN) tablet 4 mg  4 mg Oral Q6H PRN Moisés Zelaya MD        Or    ondansetron (ZOFRAN) injection 4 mg  4 mg Intravenous Q6H PRN Moisés Zelaya MD        [START ON 12/2/2023] pantoprazole (PROTONIX) EC tablet 40 mg  40 mg Oral Q AM Moisés Zelaya MD        Phosphorus Replacement - Follow Nurse / BPA Driven Protocol   Does not apply PRN Moisés Zelaya MD        potassium chloride (K-DUR,KLOR-CON) CR tablet 40 mEq  40 mEq Oral Q4H Moisés Zelaya MD   40 mEq at 12/01/23 1404    Potassium Replacement - Follow Nurse / BPA Driven Protocol   Does not apply PRN Moisés Zelaya MD        prochlorperazine (COMPAZINE) injection 5 mg  5 mg Intravenous Q6H PRN Moisés Zelaya MD        Or    prochlorperazine (COMPAZINE) tablet 5 mg  5 mg Oral Q6H PRN Moisés Zelaya MD        Or    prochlorperazine (COMPAZINE) suppository 25 mg  25 mg Rectal Q12H PRN Moisés Zelaya MD        sodium chloride 0.9 % flush 10 mL  10 mL Intravenous PRN Moisés Zelaya MD        sodium chloride 0.9 % flush 10 mL  10 mL Intravenous Q12H Moisés Zelaya MD   10 mL at 12/01/23 1405    sodium chloride 0.9 % flush 10 mL  10 mL Intravenous PRN Moisés Zelaya MD        sodium chloride 0.9 % infusion 40 mL  40 mL Intravenous PRN Moisés Zelaya MD        sucralfate (CARAFATE) tablet 1 g  1 g Oral 4x Daily Moisés Zelaya MD           Physical Exam:   Vital Signs   /72 (BP Location: Right arm, Patient Position: Lying)   Pulse 109   Temp (!) 102.9 °F (39.4 °C) (Oral)   Resp 18   Ht 175.3 cm (69\")   Wt 85.7 kg (189 lb)   SpO2 96%   BMI 27.91 kg/m²     GENERAL: Awake and alert, in no " "acute distress.   HEENT: Normocephalic, atraumatic.   EOMI. No conjunctival injection. No icterus.   NECK: Supple without nuchal rigidity.   HEART: RRR; No murmur.  LUNGS: Clear to auscultation bilaterally without wheezing, rales, rhonchi. Normal respiratory effort. Nonlabored.  ABDOMEN: Soft, some suprapubic and CVA discomfort guarding.  : Without Fuller catheter.  MSK: FROM without joint effusions noted arms/legs.    SKIN: Warm and dry without cutaneous eruptions on Inspection/palpation.    NEURO: Oriented to PPT. No focal deficits on motor/sensory exam at arms/legs.  PSYCHIATRIC: Normal insight and judgement. Cooperative with PE  PIV    Laboratory Data  Lab Results   Component Value Date    WBC 15.87 (H) 12/01/2023    HGB 12.8 (L) 12/01/2023    HCT 36.6 (L) 12/01/2023    MCV 88.8 12/01/2023     12/01/2023     Lab Results   Component Value Date    GLUCOSE 116 (H) 12/01/2023    CALCIUM 8.8 12/01/2023     (L) 12/01/2023    K 3.0 (L) 12/01/2023    CO2 21.0 (L) 12/01/2023    CL 94 (L) 12/01/2023    BUN 20 12/01/2023    CREATININE 1.38 (H) 12/01/2023     Estimated Creatinine Clearance: 54.8 mL/min (A) (by C-G formula based on SCr of 1.38 mg/dL (H)).  Lab Results   Component Value Date    ALT 15 12/01/2023    AST 17 12/01/2023    ALKPHOS 76 12/01/2023    BILITOT 1.5 (H) 12/01/2023     No results found for: \"CRP\"  No results found for: \"SEDRATE\"    The above labs were reviewed.     Microbiology:  Microbiology Results (last 10 days)       Procedure Component Value - Date/Time    Blood Culture - Blood, Arm, Left [[id number]]  (Abnormal) Collected: 11/30/23 1253    Lab Status: Preliminary result Specimen: Blood from Arm, Left Updated: 12/01/23 0523     Blood Culture Abnormal Stain     Gram Stain Aerobic Bottle Gram negative bacilli    Blood Culture ID, PCR - Blood, Arm, Left [[id number]]  (Abnormal) Collected: 11/30/23 1253    Lab Status: Final result Specimen: Blood from Arm, Left Updated: 12/01/23 0656     " BCID, PCR Escherichia coli. Identification by BCID2 PCR.     BCID, PCR 2 CTX-M (ESBL) detected. Identification by BCID2 PCR     BOTTLE TYPE Aerobic Bottle            Radiology:  XR Chest 1 View    Result Date: 12/1/2023  Impression: Low lung volumes with hypoventilatory change including bibasilar atelectasis. No focal consolidation. Electronically Signed: Jadiel Teran MD  12/1/2023 11:38 AM EST  Workstation ID: [id number]    CT Abdomen Pelvis With Contrast    Result Date: 11/30/2023  Impression: 1.Imaging features are suggestive of left-sided pyelonephritis without evidence of obstruction. Correlate with symptoms and lab values. 2.Interval likely chronic T11 compression fracture. Electronically Signed: Meng Browne MD  11/30/2023 4:23 PM CST  Workstation ID: [id number]     I personally reviewed the above CT: Left-sided renal swelling      Assessment   ESBL E. coli bacteremia and pyelonephritis  Fever, sepsis due to above  Neutrophilic leukocytosis  Acute kidney injury  Hyponatremia  History of renal stones but none identified on CT imaging  Possible history of prostatomegaly, but no history of prostatitis: No abnormality noted on CT scan  Listed history of allergy to Zosyn in 2017, which apparently was related to the time he had a surgical procedure and based on description sounds like he had more of a contact dermatitis from some sort of cleaning solution used in the OR rather than no systemic reaction to the antibiotic.  Patient says he has tolerated amoxicillin since then, so allergy disproved    PLAN:   - Microbiology data reviewed.  Follow-up pending sensitivity data for E. coli as well as repeat blood cultures and urine culture  - Follow CBC, CMP    - Ertapenem 1 g daily.    - Probiotic while on antibiotics    Planning for IV antibiotics for at least 7 to 10 days.  Discussed possibly daily infusion at [facility] once otherwise ready for discharge if he will be staying locally.  Patient has staying with his sister, who  lives in [place], but also says that his PCP in [place] can provide daily IV antibiotics through his office.     Discussed with patient and sister at bedside.  I will follow over the weekend    Sancho Min MD  12/1/2023      Electronically signed by Sancho Min MD at 12/01/23 8719

## 2023-12-05 ENCOUNTER — READMISSION MANAGEMENT (OUTPATIENT)
Dept: CALL CENTER | Facility: HOSPITAL | Age: 68
End: 2023-12-05
Payer: MEDICARE

## 2023-12-05 LAB — BACTERIA SPEC AEROBE CULT: NORMAL

## 2023-12-05 NOTE — OUTREACH NOTE
Prep Survey      Flowsheet Row Responses   [facility] facility patient discharged from? [place]   Is LACE score < 7 ? No   Eligibility Readm Mgmt   Discharge diagnosis Bacteremia   Does the patient have one of the following disease processes/diagnoses(primary or secondary)? Other   Comments regarding appointments continue Ertapenem 1gm IV daily x 7-10 days  - f/u with [facility] for daily infusions   Medication alerts for this patient see avs for all new and changed meds--IV antibx   General alerts for this patient IV antibx--[facility]   Prep survey completed? Yes            Gudelia ELLINGTON - Registered Nurse

## 2023-12-06 NOTE — PROGRESS NOTES
Enter Query Response Below      Query Response:     Elevated bilirubin due to sepsis     Electronically signed by Linda Onofre DO, 23, 1:38 PM EST.           If applicable, please update the problem list.       Patient: Antonino Barrera        : 1955  Account: [id number]           Admit Date: 2023        How to Respond to this query:       a. Click New Note     b. Answer query within the yellow box.                c. Update the Problem List, if applicable.      If you have any questions about this query contact me at: Sincerely,    Kali Bynum RN, BSN  Clinical Documentation Integrity  [facility]  [email or web address]       ,     68 year old male with history of diabetes, gout, chronic diarrhea,  presented with fever, flank pain was found to have ESBL E. coli bacteremia, pyelonephritis, hyponatremia and sepsis. Total bilirubin levels in the stay were 2.1 ()-> 1.5 ()-> 0.8 (12/3).  Patient was treated with intravenous fluid bolus x1 in the stay, along with intravenous antibiotics and continuation of home medications.     Please clarify the following after study:    Elevated bilirubin due to sepsis  Elevated bilirubin clinically insignificant in the stay  Other- specify_______  Unable to determine      By submitting this query, we are merely seeking further clarification of documentation to accurately reflect all conditions that you are monitoring, evaluating, treating or that extend the hospitalization or utilize additional resources of care. Please utilize your independent clinical judgment when addressing the question(s) above.     This query and your response, once completed, will be entered into the legal medical record.    Sincerely,  Kali Bynum  Clinical Documentation Integrity Program

## 2023-12-07 ENCOUNTER — READMISSION MANAGEMENT (OUTPATIENT)
Dept: CALL CENTER | Facility: HOSPITAL | Age: 68
End: 2023-12-07
Payer: MEDICARE

## 2023-12-07 NOTE — OUTREACH NOTE
Medical Week 1 Survey      Flowsheet Row Responses   [facility] patient discharged from? [place]   Does the patient have one of the following disease processes/diagnoses(primary or secondary)? Other   Week 1 attempt successful? Yes   Call start time 1612   Call end time 1614   Discharge diagnosis Bacteremia   Meds reviewed with patient/caregiver? Yes   Is the patient having any side effects they believe may be caused by any medication additions or changes? No   Does the patient have all medications ordered at discharge? Yes   Is the patient taking all medications as directed (includes completed medication regime)? Yes   Comments regarding appointments continue Ertapenem 1gm IV daily x 7-10 days  - f/u with [facility] for daily infusions   Does the patient have a primary care provider?  Yes   Does the patient have an appointment with their PCP within 7 days of discharge? Yes   Has the patient kept scheduled appointments due by today? Yes   Has home health visited the patient within 72 hours of discharge? N/A   Psychosocial issues? No   Did the patient receive a copy of their discharge instructions? Yes   Nursing interventions Reviewed instructions with patient   What is the patient's perception of their health status since discharge? Improving   Is the patient/caregiver able to teach back signs and symptoms related to disease process for when to call PCP? Yes   Is the patient/caregiver able to teach back signs and symptoms related to disease process for when to call 911? Yes   Is the patient/caregiver able to teach back the hierarchy of who to call/visit for symptoms/problems? PCP, Specialist, Home health nurse, Urgent Care, ED, 911 Yes   Additional teach back comments states free of infection, states has daily infusions in [facility]   Week 1 call completed? Yes   Call end time 1614            Cyndi MEDINA - Registered Nurse

## 2023-12-15 ENCOUNTER — READMISSION MANAGEMENT (OUTPATIENT)
Dept: CALL CENTER | Facility: HOSPITAL | Age: 68
End: 2023-12-15
Payer: MEDICARE

## 2023-12-15 NOTE — OUTREACH NOTE
Medical Week 2 Survey      Flowsheet Row Responses   [facility] patient discharged from? [place]   Does the patient have one of the following disease processes/diagnoses(primary or secondary)? Other   Week 2 attempt successful? Yes   Call start time 1250   General alerts for this patient IV antibx--[facility]   Discharge diagnosis Bacteremia   Call end time 1253   Meds reviewed with patient/caregiver? Yes   Is the patient taking all medications as directed (includes completed medication regime)? Yes   Comments regarding appointments Pt reports last dose of IV abx today, plans to have blood cx next week at f/u appt per MD order.   Has the patient kept scheduled appointments due by today? Yes   Psychosocial issues? No   Comments Pt reports feeling stronger, began outpt PT today to work on strengthening.Pt denies N/V/D or fever/chills.Pt tolerating po intake and voiced no other issues.   What is the patient's perception of their health status since discharge? Improving   Is the patient/caregiver able to teach back signs and symptoms related to disease process for when to call PCP? Yes   Is the patient/caregiver able to teach back signs and symptoms related to disease process for when to call 911? Yes   Is the patient/caregiver able to teach back the hierarchy of who to call/visit for symptoms/problems? PCP, Specialist, Home health nurse, Urgent Care, ED, 911 Yes   If the patient is a current smoker, are they able to teach back resources for cessation? Not a smoker   Week 2 Call Completed? Yes   Revoked No further contact(revokes)-requires comment   Is the patient interested in additional calls from an ambulatory ? No   Would this patient benefit from a Referral to [facility] Social Work? No   Call end time 1253            Sheela CASTILLO - Registered Nurse

## (undated) DEVICE — ENDOPATH XCEL BLADELESS TROCARS WITH STABILITY SLEEVES: Brand: ENDOPATH XCEL

## (undated) DEVICE — 3M™ STERI-STRIP™ REINFORCED ADHESIVE SKIN CLOSURES, R1547, 1/2 IN X 4 IN (12 MM X 100 MM), 6 STRIPS/ENVELOPE: Brand: 3M™ STERI-STRIP™

## (undated) DEVICE — SOL LR 1000ML

## (undated) DEVICE — SUT MNCRYL PLS ANTIB UD 4/0 PS2 18IN

## (undated) DEVICE — GLV SURG TRIUMPH ORTHO W/ALOE PF LTX 7.5 STRL

## (undated) DEVICE — MEDI-VAC NON-CONDUCTIVE SUCTION TUBING: Brand: CARDINAL HEALTH

## (undated) DEVICE — ADHS LIQ MASTISOL 2/3ML

## (undated) DEVICE — ANTIBACTERIAL UNDYED BRAIDED (POLYGLACTIN 910), SYNTHETIC ABSORBABLE SURGICAL SUTURE: Brand: COATED VICRYL

## (undated) DEVICE — ENDOPATH XCEL UNIVERSAL TROCAR STABLILITY SLEEVES: Brand: ENDOPATH XCEL

## (undated) DEVICE — ENDOCUT SCISSOR TIP, DISPOSABLE: Brand: RENEW

## (undated) DEVICE — COVER,LIGHT HANDLE,FLX,1/PK: Brand: MEDLINE INDUSTRIES, INC.

## (undated) DEVICE — [HIGH FLOW HEATED INSUFFLATOR TUBING,  DO NOT USE IF PACKAGE IS DAMAGED]

## (undated) DEVICE — ENDOPATH XCEL BLUNT TIP TROCARS WITH SMOOTH SLEEVES: Brand: ENDOPATH XCEL

## (undated) DEVICE — CANNULA,ADULT,SOFT-TOUCH,7TUBE,SC: Brand: MEDLINE

## (undated) DEVICE — MEDI-VAC YANKAUER SUCTION HANDLE W/BULBOUS TIP: Brand: CARDINAL HEALTH

## (undated) DEVICE — PK LAP LASR CHOLE 10